# Patient Record
Sex: FEMALE | Race: WHITE | Employment: FULL TIME | URBAN - NONMETROPOLITAN AREA
[De-identification: names, ages, dates, MRNs, and addresses within clinical notes are randomized per-mention and may not be internally consistent; named-entity substitution may affect disease eponyms.]

---

## 2018-01-22 NOTE — PROGRESS NOTES
diagnosis & problem list.     Damien Gonzales is a 74 y.o. female seen in the consultation for evaluation regarding gallbladder. Onset of symptoms was over a year ago with gradually worsened since that time. The symptoms have included pain radiating to the back and epigastric pain. Owen Stephen describes the pain as aching, recurrent and moderate in severity. Her symptoms are worse with eating and improved with nothing. She denies any history of pancreatitis, jaundice, pancreatitis or ulcer disease. Symptoms have been unrelieved by antacids. This pain is different than her GERD. Stress test was negative. I have personally reviewed the following films:  CT scan performed on 11/13/17 which showed gallstones. Past Medical History  Past Medical History:   Diagnosis Date    COPD (chronic obstructive pulmonary disease) ([address]     Depression     Hypertension      Past Surgical History  Past Surgical History:   Procedure Laterality Date    APPENDECTOMY      COLONOSCOPY      CYST REMOVAL      multiple    HYSTERECTOMY      TUBAL LIGATION      UPPER GASTROINTESTINAL ENDOSCOPY       Medications  Current Outpatient Prescriptions   Medication Sig Dispense Refill    vitamin C (ASCORBIC ACID) 500 MG tablet Take 500 mg by mouth daily      Alendronate Sodium (FOSAMAX PO) Take by mouth once a week      hydrOXYzine (ATARAX) 25 MG tablet Take 25 mg by mouth every 6 hours as needed for Anxiety      albuterol-ipratropium (COMBIVENT)  MCG/ACT inhaler Inhale 2 puffs into the lungs every 6 hours as needed for Wheezing      albuterol sulfate  (90 Base) MCG/ACT inhaler Inhale 2 puffs into the lungs every 6 hours as needed for Wheezing      albuterol (PROVENTIL) (2.5 MG/3ML) 0.083% nebulizer solution Take 2.5 mg by nebulization every 6 hours as needed for Wheezing      HYDROCHLOROTHIAZIDE PO Take  by mouth daily.  Pyridoxine HCl (VITAMIN B-6) 100 MG tablet Take 100 mg by mouth daily.       aspirin 81 MG EC tablet Take 81 mg by mouth daily.  folic acid (FOLVITE) 1 MG tablet Take 1 mg by mouth daily.  omeprazole (PRILOSEC) 20 MG capsule Take 20 mg by mouth Daily       traZODone (DESYREL) 100 MG tablet Take 200 mg by mouth nightly. No current facility-administered medications for this visit. Allergies  has No Known Allergies. Family History  family history includes Cancer in an other family member; Diabetes in her maternal grandmother; Heart Disease in her father and mother; Memory Loss in her father and mother. Social History   reports that she quit smoking about 3 weeks ago. Her smoking use included Cigarettes. She has a 30.00 pack-year smoking history. She has never used smokeless tobacco. She reports that she does not drink alcohol or use drugs. Health Screening Exams  Health Maintenance   Topic Date Due    Potassium monitoring  1950    Creatinine monitoring  1950    Hepatitis C screen  1950    HIV screen  12/18/1967    DTaP/Tdap/Td vaccine (1 - Tdap) 12/18/1971    Cervical cancer screen  12/18/1973    Lipid screen  12/18/1992    Diabetes screen  12/18/1992    Breast cancer screen  12/18/2002    Colon cancer screen colonoscopy  12/18/2002    Smoker: low dose lung CT screening  12/18/2007    Zostavax vaccine  12/18/2012    Flu vaccine (1) 09/01/2017    Pneumococcal low/med risk (1 of 2 - PCV13) 12/18/2017    DEXA (modify frequency per FRAX score)  12/18/2017     Review of Systems  Constitutional: Positive for activity change, appetite change and fatigue. Negative for chills, diaphoresis, fever and unexpected weight change. HENT: Negative for congestion, dental problem, drooling, ear discharge, ear pain, facial swelling, hearing loss, mouth sores, nosebleeds, postnasal drip, rhinorrhea, sinus pain, sinus pressure, sneezing, sore throat, tinnitus, trouble swallowing and voice change.     Eyes: Negative for photophobia, pain, discharge, redness, itching and visual disturbance. Respiratory: Positive for cough and shortness of breath. Negative for apnea, choking, chest tightness, wheezing and stridor. Cardiovascular: Negative for chest pain, palpitations and leg swelling. Gastrointestinal: Positive for abdominal distention, abdominal pain and nausea. Negative for anal bleeding, blood in stool, constipation, diarrhea, rectal pain and vomiting. Genitourinary: Positive for difficulty urinating. Negative for decreased urine volume, dyspareunia, dysuria, enuresis, flank pain, frequency, genital sores, hematuria, menstrual problem, pelvic pain, urgency, vaginal bleeding, vaginal discharge and vaginal pain. Musculoskeletal: Positive for back pain and myalgias. Negative for arthralgias, gait problem, joint swelling, neck pain and neck stiffness. Skin: Negative for color change, pallor, rash and wound. Neurological: Negative for dizziness, tremors, seizures, syncope, facial asymmetry, speech difficulty, weakness, light-headedness, numbness and headaches. Hematological: Negative for adenopathy. Does not bruise/bleed easily. Psychiatric/Behavioral: Positive for sleep disturbance. Negative for agitation, behavioral problems, confusion, decreased concentration, dysphoric mood, hallucinations, self-injury and suicidal ideas. The patient is not nervous/anxious and is not hyperactive. OBJECTIVE      VITALS:  height is 5' 5\" (1.651 m) and weight is 165 lb (74.8 kg). Her tympanic temperature is 98.2 °F (36.8 °C). Her blood pressure is 126/70 and her pulse is 74. Her respiration is 18. Pain Score:   0 - No pain (abdominal \"pressure\")  CONSTITUTIONAL: Alert and oriented times 3, no acute distress and cooperative to examination with proper mood and affect. SKIN: Skin color, texture, turgor normal. No rashes or lesions. LYMPH: no cervical nodes, no inguinal nodes  HEENT: Head is normocephalic, atraumatic. EOMI, PERRLA.   NECK: Supple, symmetrical, trachea

## 2018-01-23 ENCOUNTER — OFFICE VISIT (OUTPATIENT)
Dept: SURGERY | Age: 68
End: 2018-01-23
Payer: MEDICARE

## 2018-01-23 VITALS
SYSTOLIC BLOOD PRESSURE: 126 MMHG | WEIGHT: 165 LBS | RESPIRATION RATE: 18 BRPM | HEIGHT: 65 IN | TEMPERATURE: 98.2 F | DIASTOLIC BLOOD PRESSURE: 70 MMHG | BODY MASS INDEX: 27.49 KG/M2 | HEART RATE: 74 BPM

## 2018-01-23 DIAGNOSIS — Z01.818 PRE-OP TESTING: ICD-10-CM

## 2018-01-23 DIAGNOSIS — K80.10 CHRONIC CHOLECYSTITIS WITH CALCULUS: Primary | ICD-10-CM

## 2018-01-23 DIAGNOSIS — I10 ESSENTIAL HYPERTENSION: ICD-10-CM

## 2018-01-23 PROCEDURE — 99204 OFFICE O/P NEW MOD 45 MIN: CPT | Performed by: SURGERY

## 2018-01-23 RX ORDER — HYDROXYZINE HYDROCHLORIDE 25 MG/1
25 TABLET, FILM COATED ORAL EVERY 6 HOURS PRN
COMMUNITY
End: 2018-03-19

## 2018-01-23 RX ORDER — ALBUTEROL SULFATE 2.5 MG/3ML
2.5 SOLUTION RESPIRATORY (INHALATION) EVERY 6 HOURS PRN
COMMUNITY
End: 2021-01-13 | Stop reason: SDUPTHER

## 2018-01-23 RX ORDER — ASCORBIC ACID 500 MG
500 TABLET ORAL DAILY
COMMUNITY

## 2018-01-23 RX ORDER — ALBUTEROL SULFATE 90 UG/1
2 AEROSOL, METERED RESPIRATORY (INHALATION) EVERY 6 HOURS PRN
COMMUNITY
End: 2019-04-16 | Stop reason: ALTCHOICE

## 2018-01-23 ASSESSMENT — ENCOUNTER SYMPTOMS
SORE THROAT: 0
VOICE CHANGE: 0
EYE REDNESS: 0
CHOKING: 0
BLOOD IN STOOL: 0
CONSTIPATION: 0
RHINORRHEA: 0
PHOTOPHOBIA: 0
CHEST TIGHTNESS: 0
STRIDOR: 0
ANAL BLEEDING: 0
EYE DISCHARGE: 0
APNEA: 0
EYE PAIN: 0
NAUSEA: 1
RECTAL PAIN: 0
DIARRHEA: 0
SINUS PRESSURE: 0
SINUS PAIN: 0
ABDOMINAL DISTENTION: 1
COLOR CHANGE: 0
VOMITING: 0
COUGH: 1
SHORTNESS OF BREATH: 1
FACIAL SWELLING: 0
BACK PAIN: 1
ABDOMINAL PAIN: 1
WHEEZING: 0
TROUBLE SWALLOWING: 0
EYE ITCHING: 0

## 2018-01-23 NOTE — PROGRESS NOTES
hallucinations, self-injury and suicidal ideas. The patient is not nervous/anxious and is not hyperactive.       /70 (Site: Right Arm, Position: Sitting, Cuff Size: Medium Adult)   Pulse 74   Temp 98.2 °F (36.8 °C) (Tympanic)   Resp 18   Ht 5' 5\" (1.651 m)   Wt 165 lb (74.8 kg)   BMI 27.46 kg/m²     Objective:   Physical Exam    Assessment:            Plan:

## 2018-01-29 ENCOUNTER — HOSPITAL ENCOUNTER (OUTPATIENT)
Age: 68
Setting detail: OUTPATIENT SURGERY
Discharge: HOME | End: 2018-01-29
Attending: SURGERY | Admitting: SURGERY
Payer: MEDICARE

## 2018-01-29 ENCOUNTER — ANESTHESIA EVENT (OUTPATIENT)
Dept: OPERATING ROOM | Age: 68
End: 2018-01-29
Payer: MEDICARE

## 2018-01-29 ENCOUNTER — ANESTHESIA (OUTPATIENT)
Dept: OPERATING ROOM | Age: 68
End: 2018-01-29
Payer: MEDICARE

## 2018-01-29 VITALS
HEART RATE: 70 BPM | SYSTOLIC BLOOD PRESSURE: 133 MMHG | DIASTOLIC BLOOD PRESSURE: 73 MMHG | OXYGEN SATURATION: 95 % | HEIGHT: 65 IN | WEIGHT: 165 LBS | RESPIRATION RATE: 16 BRPM | TEMPERATURE: 98 F | BODY MASS INDEX: 27.49 KG/M2

## 2018-01-29 VITALS
RESPIRATION RATE: 18 BRPM | SYSTOLIC BLOOD PRESSURE: 148 MMHG | OXYGEN SATURATION: 99 % | TEMPERATURE: 96.8 F | DIASTOLIC BLOOD PRESSURE: 73 MMHG

## 2018-01-29 DIAGNOSIS — G89.18 ACUTE POSTOPERATIVE PAIN: Primary | ICD-10-CM

## 2018-01-29 PROCEDURE — 7100000001 HC PACU RECOVERY - ADDTL 15 MIN: Performed by: SURGERY

## 2018-01-29 PROCEDURE — 6360000002 HC RX W HCPCS: Performed by: ANESTHESIOLOGY

## 2018-01-29 PROCEDURE — 7100000000 HC PACU RECOVERY - FIRST 15 MIN: Performed by: SURGERY

## 2018-01-29 PROCEDURE — 2780000010 HC IMPLANT OTHER: Performed by: SURGERY

## 2018-01-29 PROCEDURE — 3600000013 HC SURGERY LEVEL 3 ADDTL 15MIN: Performed by: SURGERY

## 2018-01-29 PROCEDURE — 47562 LAPAROSCOPIC CHOLECYSTECTOMY: CPT | Performed by: SURGERY

## 2018-01-29 PROCEDURE — 3600000003 HC SURGERY LEVEL 3 BASE: Performed by: SURGERY

## 2018-01-29 PROCEDURE — 2500000003 HC RX 250 WO HCPCS: Performed by: SURGERY

## 2018-01-29 PROCEDURE — 6360000002 HC RX W HCPCS: Performed by: NURSE ANESTHETIST, CERTIFIED REGISTERED

## 2018-01-29 PROCEDURE — 88304 TISSUE EXAM BY PATHOLOGIST: CPT

## 2018-01-29 PROCEDURE — 2580000003 HC RX 258: Performed by: SURGERY

## 2018-01-29 PROCEDURE — 3700000001 HC ADD 15 MINUTES (ANESTHESIA): Performed by: SURGERY

## 2018-01-29 PROCEDURE — 6360000002 HC RX W HCPCS: Performed by: SURGERY

## 2018-01-29 PROCEDURE — 3700000000 HC ANESTHESIA ATTENDED CARE: Performed by: SURGERY

## 2018-01-29 PROCEDURE — 7100000011 HC PHASE II RECOVERY - ADDTL 15 MIN: Performed by: SURGERY

## 2018-01-29 PROCEDURE — 2500000003 HC RX 250 WO HCPCS: Performed by: NURSE ANESTHETIST, CERTIFIED REGISTERED

## 2018-01-29 PROCEDURE — A4450 NON-WATERPROOF TAPE: HCPCS | Performed by: SURGERY

## 2018-01-29 PROCEDURE — 7100000010 HC PHASE II RECOVERY - FIRST 15 MIN: Performed by: SURGERY

## 2018-01-29 RX ORDER — FENTANYL CITRATE 50 UG/ML
50 INJECTION, SOLUTION INTRAMUSCULAR; INTRAVENOUS EVERY 5 MIN PRN
Status: DISCONTINUED | OUTPATIENT
Start: 2018-01-29 | End: 2018-01-29 | Stop reason: HOSPADM

## 2018-01-29 RX ORDER — ONDANSETRON 2 MG/ML
4 INJECTION INTRAMUSCULAR; INTRAVENOUS EVERY 6 HOURS PRN
Status: DISCONTINUED | OUTPATIENT
Start: 2018-01-29 | End: 2018-01-29 | Stop reason: HOSPADM

## 2018-01-29 RX ORDER — LIDOCAINE HYDROCHLORIDE 20 MG/ML
INJECTION, SOLUTION INFILTRATION; PERINEURAL PRN
Status: DISCONTINUED | OUTPATIENT
Start: 2018-01-29 | End: 2018-01-29 | Stop reason: SDUPTHER

## 2018-01-29 RX ORDER — HYDROCODONE BITARTRATE AND ACETAMINOPHEN 5; 325 MG/1; MG/1
1 TABLET ORAL EVERY 4 HOURS PRN
Qty: 40 TABLET | Refills: 0 | Status: SHIPPED | OUTPATIENT
Start: 2018-01-29 | End: 2018-02-05

## 2018-01-29 RX ORDER — GLYCOPYRROLATE 0.2 MG/ML
INJECTION INTRAMUSCULAR; INTRAVENOUS PRN
Status: DISCONTINUED | OUTPATIENT
Start: 2018-01-29 | End: 2018-01-29 | Stop reason: SDUPTHER

## 2018-01-29 RX ORDER — SUCCINYLCHOLINE CHLORIDE 20 MG/ML
INJECTION INTRAMUSCULAR; INTRAVENOUS PRN
Status: DISCONTINUED | OUTPATIENT
Start: 2018-01-29 | End: 2018-01-29 | Stop reason: SDUPTHER

## 2018-01-29 RX ORDER — ROCURONIUM BROMIDE 10 MG/ML
INJECTION, SOLUTION INTRAVENOUS PRN
Status: DISCONTINUED | OUTPATIENT
Start: 2018-01-29 | End: 2018-01-29 | Stop reason: SDUPTHER

## 2018-01-29 RX ORDER — LAMOTRIGINE 100 MG/1
100 TABLET ORAL DAILY
COMMUNITY

## 2018-01-29 RX ORDER — MORPHINE SULFATE 2 MG/ML
2 INJECTION, SOLUTION INTRAMUSCULAR; INTRAVENOUS EVERY 5 MIN PRN
Status: DISCONTINUED | OUTPATIENT
Start: 2018-01-29 | End: 2018-01-29 | Stop reason: HOSPADM

## 2018-01-29 RX ORDER — SODIUM CHLORIDE 9 MG/ML
INJECTION, SOLUTION INTRAVENOUS CONTINUOUS
Status: DISCONTINUED | OUTPATIENT
Start: 2018-01-29 | End: 2018-01-29 | Stop reason: HOSPADM

## 2018-01-29 RX ORDER — HYDROCODONE BITARTRATE AND ACETAMINOPHEN 5; 325 MG/1; MG/1
2 TABLET ORAL EVERY 4 HOURS PRN
Status: DISCONTINUED | OUTPATIENT
Start: 2018-01-29 | End: 2018-01-29 | Stop reason: HOSPADM

## 2018-01-29 RX ORDER — FENTANYL CITRATE 50 UG/ML
INJECTION, SOLUTION INTRAMUSCULAR; INTRAVENOUS PRN
Status: DISCONTINUED | OUTPATIENT
Start: 2018-01-29 | End: 2018-01-29 | Stop reason: SDUPTHER

## 2018-01-29 RX ORDER — PROPOFOL 10 MG/ML
INJECTION, EMULSION INTRAVENOUS PRN
Status: DISCONTINUED | OUTPATIENT
Start: 2018-01-29 | End: 2018-01-29 | Stop reason: SDUPTHER

## 2018-01-29 RX ORDER — MIDAZOLAM HYDROCHLORIDE 1 MG/ML
INJECTION INTRAMUSCULAR; INTRAVENOUS PRN
Status: DISCONTINUED | OUTPATIENT
Start: 2018-01-29 | End: 2018-01-29 | Stop reason: SDUPTHER

## 2018-01-29 RX ORDER — BUPIVACAINE HYDROCHLORIDE AND EPINEPHRINE 5; 5 MG/ML; UG/ML
INJECTION, SOLUTION EPIDURAL; INTRACAUDAL; PERINEURAL PRN
Status: DISCONTINUED | OUTPATIENT
Start: 2018-01-29 | End: 2018-01-29 | Stop reason: HOSPADM

## 2018-01-29 RX ORDER — SODIUM CHLORIDE 0.9 % (FLUSH) 0.9 %
10 SYRINGE (ML) INJECTION PRN
Status: DISCONTINUED | OUTPATIENT
Start: 2018-01-29 | End: 2018-01-29 | Stop reason: HOSPADM

## 2018-01-29 RX ORDER — LABETALOL HYDROCHLORIDE 5 MG/ML
10 INJECTION, SOLUTION INTRAVENOUS EVERY 10 MIN PRN
Status: DISCONTINUED | OUTPATIENT
Start: 2018-01-29 | End: 2018-01-29 | Stop reason: HOSPADM

## 2018-01-29 RX ORDER — HYDROCODONE BITARTRATE AND ACETAMINOPHEN 5; 325 MG/1; MG/1
1 TABLET ORAL EVERY 4 HOURS PRN
Status: DISCONTINUED | OUTPATIENT
Start: 2018-01-29 | End: 2018-01-29 | Stop reason: HOSPADM

## 2018-01-29 RX ORDER — ONDANSETRON 2 MG/ML
INJECTION INTRAMUSCULAR; INTRAVENOUS PRN
Status: DISCONTINUED | OUTPATIENT
Start: 2018-01-29 | End: 2018-01-29 | Stop reason: SDUPTHER

## 2018-01-29 RX ORDER — NEOSTIGMINE METHYLSULFATE 1 MG/ML
INJECTION, SOLUTION INTRAVENOUS PRN
Status: DISCONTINUED | OUTPATIENT
Start: 2018-01-29 | End: 2018-01-29 | Stop reason: SDUPTHER

## 2018-01-29 RX ORDER — SODIUM CHLORIDE 0.9 % (FLUSH) 0.9 %
10 SYRINGE (ML) INJECTION EVERY 12 HOURS SCHEDULED
Status: DISCONTINUED | OUTPATIENT
Start: 2018-01-29 | End: 2018-01-29 | Stop reason: HOSPADM

## 2018-01-29 RX ORDER — PROMETHAZINE HYDROCHLORIDE 12.5 MG/1
12.5 TABLET ORAL EVERY 6 HOURS PRN
Qty: 20 TABLET | Refills: 0 | Status: SHIPPED | OUTPATIENT
Start: 2018-01-29 | End: 2018-02-05

## 2018-01-29 RX ORDER — DEXAMETHASONE SODIUM PHOSPHATE 4 MG/ML
INJECTION, SOLUTION INTRA-ARTICULAR; INTRALESIONAL; INTRAMUSCULAR; INTRAVENOUS; SOFT TISSUE PRN
Status: DISCONTINUED | OUTPATIENT
Start: 2018-01-29 | End: 2018-01-29 | Stop reason: SDUPTHER

## 2018-01-29 RX ADMIN — ONDANSETRON 4 MG: 2 INJECTION INTRAMUSCULAR; INTRAVENOUS at 07:39

## 2018-01-29 RX ADMIN — SUCCINYLCHOLINE CHLORIDE 120 MG: 20 INJECTION, SOLUTION INTRAMUSCULAR; INTRAVENOUS at 07:34

## 2018-01-29 RX ADMIN — NEOSTIGMINE METHYLSULFATE 3 MG: 1 INJECTION, SOLUTION INTRAVENOUS at 08:05

## 2018-01-29 RX ADMIN — FENTANYL CITRATE 50 MCG: 50 INJECTION INTRAMUSCULAR; INTRAVENOUS at 08:35

## 2018-01-29 RX ADMIN — LIDOCAINE HYDROCHLORIDE 100 MG: 20 INJECTION, SOLUTION INFILTRATION; PERINEURAL at 07:30

## 2018-01-29 RX ADMIN — SODIUM CHLORIDE: 9 INJECTION, SOLUTION INTRAVENOUS at 06:33

## 2018-01-29 RX ADMIN — WATER 2 G: 1 INJECTION INTRAMUSCULAR; INTRAVENOUS; SUBCUTANEOUS at 07:39

## 2018-01-29 RX ADMIN — PROPOFOL 150 MG: 10 INJECTION, EMULSION INTRAVENOUS at 07:34

## 2018-01-29 RX ADMIN — Medication 20 MG: at 07:44

## 2018-01-29 RX ADMIN — MIDAZOLAM HYDROCHLORIDE 2 MG: 1 INJECTION, SOLUTION INTRAMUSCULAR; INTRAVENOUS at 07:26

## 2018-01-29 RX ADMIN — FENTANYL CITRATE 50 MCG: 50 INJECTION INTRAMUSCULAR; INTRAVENOUS at 08:30

## 2018-01-29 RX ADMIN — FENTANYL CITRATE 100 MCG: 50 INJECTION INTRAMUSCULAR; INTRAVENOUS at 07:30

## 2018-01-29 RX ADMIN — GLYCOPYRROLATE 0.6 MG: 0.2 INJECTION, SOLUTION INTRAMUSCULAR; INTRAVENOUS at 08:05

## 2018-01-29 RX ADMIN — DEXAMETHASONE SODIUM PHOSPHATE 10 MG: 4 INJECTION, SOLUTION INTRAMUSCULAR; INTRAVENOUS at 07:39

## 2018-01-29 ASSESSMENT — PULMONARY FUNCTION TESTS
PIF_VALUE: 2
PIF_VALUE: 15
PIF_VALUE: 23
PIF_VALUE: 4
PIF_VALUE: 28
PIF_VALUE: 24
PIF_VALUE: 20
PIF_VALUE: 4
PIF_VALUE: 10
PIF_VALUE: 15
PIF_VALUE: 2
PIF_VALUE: 4
PIF_VALUE: 25
PIF_VALUE: 0
PIF_VALUE: 22
PIF_VALUE: 18
PIF_VALUE: 13
PIF_VALUE: 18
PIF_VALUE: 24
PIF_VALUE: 25
PIF_VALUE: 15
PIF_VALUE: 9
PIF_VALUE: 22
PIF_VALUE: 0
PIF_VALUE: 2
PIF_VALUE: 22
PIF_VALUE: 23
PIF_VALUE: 2
PIF_VALUE: 25
PIF_VALUE: 15
PIF_VALUE: 23
PIF_VALUE: 24
PIF_VALUE: 20
PIF_VALUE: 24
PIF_VALUE: 21
PIF_VALUE: 4
PIF_VALUE: 19
PIF_VALUE: 25
PIF_VALUE: 16
PIF_VALUE: 17
PIF_VALUE: 23
PIF_VALUE: 16
PIF_VALUE: 16
PIF_VALUE: 0
PIF_VALUE: 15
PIF_VALUE: 6
PIF_VALUE: 2
PIF_VALUE: 2
PIF_VALUE: 15
PIF_VALUE: 16

## 2018-01-29 ASSESSMENT — PAIN SCALES - GENERAL
PAINLEVEL_OUTOF10: 0
PAINLEVEL_OUTOF10: 7
PAINLEVEL_OUTOF10: 4
PAINLEVEL_OUTOF10: 4
PAINLEVEL_OUTOF10: 7
PAINLEVEL_OUTOF10: 4

## 2018-01-29 NOTE — ANESTHESIA PRE PROCEDURE
given: Not Answered      Vital Signs (Current):   Vitals:    01/24/18 0841 01/29/18 0619   BP:  112/65   Pulse:  75   Resp:  16   Temp:  36.7 °C (98.1 °F)   SpO2:  96%   Weight: 165 lb (74.8 kg) 165 lb (74.8 kg)   Height: 5' 5\" (1.651 m)                                               BP Readings from Last 3 Encounters:   01/29/18 112/65   01/23/18 126/70   08/19/13 116/76       NPO Status: Time of last liquid consumption: 2100                        Time of last solid consumption: 2330                        Date of last liquid consumption: 01/28/18                        Date of last solid food consumption: 01/28/18    BMI:   Wt Readings from Last 3 Encounters:   01/29/18 165 lb (74.8 kg)   01/23/18 165 lb (74.8 kg)   08/19/13 170 lb (77.1 kg)     Body mass index is 27.46 kg/m². CBC: No results found for: WBC, RBC, HGB, HCT, MCV, RDW, PLT    CMP: No results found for: NA, K, CL, CO2, BUN, CREATININE, GFRAA, AGRATIO, LABGLOM, GLUCOSE, PROT, CALCIUM, BILITOT, ALKPHOS, AST, ALT    POC Tests: No results for input(s): POCGLU, POCNA, POCK, POCCL, POCBUN, POCHEMO, POCHCT in the last 72 hours.     Coags: No results found for: PROTIME, INR, APTT    HCG (If Applicable): No results found for: PREGTESTUR, PREGSERUM, HCG, HCGQUANT     ABGs: No results found for: PHART, PO2ART, [id number], TIX4HDQ, BEART, F1GNQCXQ     Type & Screen (If Applicable):  No results found for: LABABO, [address]    Anesthesia Evaluation  Patient summary reviewed and Nursing notes reviewed  Airway: Mallampati: II  TM distance: <3 FB   Neck ROM: full  Mouth opening: > = 3 FB Dental:      Comment: Denies any loose/removable    Pulmonary: breath sounds clear to auscultation  (+) COPD:  decreased breath sounds,                             Cardiovascular:Negative CV ROS  Exercise tolerance: good (>4 METS),           Rhythm: regular  Rate: normal           Beta Blocker:  Not on Beta Blocker         Neuro/Psych:   Negative Neuro/Psych ROS GI/Hepatic/Renal:   (+) GERD:,           Endo/Other: Negative Endo/Other ROS                    Abdominal:           Vascular: negative vascular ROS. Anesthesia Plan      general     ASA 2       Induction: intravenous. MIPS: Prophylactic antiemetics administered. Anesthetic plan and risks discussed with patient. Plan discussed with attending.                   Tsiha Chaves CRNA   1/29/2018

## 2018-01-29 NOTE — OP NOTE
[facility]  Operative Report  PATIENT NAME: Rosa Mesa [facility] RECORD NO. [id number]  SURGEON: Ayden Keenan  Primary Care Physician: Vel Conrad MD     PROCEDURE PERFORMED:  1/29/2018  PREOPERATIVE DIAGNOSIS: CHRONIC CHOLECYSTITIS WITH CALCULUS  POSTOPERATIVE DIAGNOSIS: Same, path pending  PROCEDURE PERFORMED:  Laparoscopic cholecystectomy. SURGEON:  Dr. Amy Martin. ANESTHESIA:  General with local.  ESTIMATED BLOOD LOSS:  10 ml  SPECIMEN:  Gallbladder sent to pathology for analysis. COMPLICATIONS:  None immediately appreciated. DISCUSSION: Darrold Mortimer is a 74y.o. year old female who was seen in evaluation at request of Vel Conrad MD regarding signs and symptoms, gallbladder disease, radiographic findings of gallstone. After history and physical examination was performed potential diagnostic and therapeutic modalities discussed with the patient. Operative and non operative management was discussed. Laparoscopic and open approaches reviewed. She was given opportunity to ask questions. Once answered informed consent was obtained. She was brought to operating room on 1/29/2018 for procedure. OPERATIVE FINDINGS:  At time of laparoscopy liver was uniformly enlarged. The gallbladder was somewhat intrahepatic. Common bile duct was well visualized. Ductal structures well visualized. The remainder of abdominal viscera was unremarkable. Gallbladder removed as described below. PROCEDURE:  The patient was brought to the operating room and placed in supine position. Placed under continuous cardiac telemetry, blood pressure, pulse oximetry monitoring and placed under general anesthesia by anesthesia department. The anterior abdominal wall was prepped and draped in sterile fashion. 1 cm periumbilical incision made with #11 scalpel blade. 10 mm Optiview port inserted into the abdomen under direct visualization.   Pneumoperitoneum was created to total of discharge instructions, prescriptions for analgesics and antiemetics. She will follow up in my office in a 2-week period of time for reevaluation.       Electronically signed by Dea Whitaker DO on 1/29/2018 at 8:07 AM

## 2018-01-30 ENCOUNTER — TELEPHONE (OUTPATIENT)
Dept: SURGERY | Age: 68
End: 2018-01-30

## 2018-02-12 NOTE — PROGRESS NOTES
Take by mouth daily      vitamin C (ASCORBIC ACID) 500 MG tablet Take 500 mg by mouth daily      Alendronate Sodium (FOSAMAX PO) Take by mouth once a week      hydrOXYzine (ATARAX) 25 MG tablet Take 25 mg by mouth every 6 hours as needed for Anxiety      albuterol-ipratropium (COMBIVENT)  MCG/ACT inhaler Inhale 2 puffs into the lungs every 6 hours as needed for Wheezing      albuterol sulfate  (90 Base) MCG/ACT inhaler Inhale 2 puffs into the lungs every 6 hours as needed for Wheezing      albuterol (PROVENTIL) (2.5 MG/3ML) 0.083% nebulizer solution Take 2.5 mg by nebulization every 6 hours as needed for Wheezing      HYDROCHLOROTHIAZIDE PO Take  by mouth daily.  Pyridoxine HCl (VITAMIN B-6) 100 MG tablet Take 100 mg by mouth daily.  aspirin 81 MG EC tablet Take 81 mg by mouth daily.  folic acid (FOLVITE) 1 MG tablet Take 1 mg by mouth daily.  omeprazole (PRILOSEC) 20 MG capsule Take 40 mg by mouth Daily       traZODone (DESYREL) 100 MG tablet Take 100 mg by mouth nightly        No current facility-administered medications for this visit. Allergies  is allergic to adhesive tape. Social History   reports that she quit smoking about 6 weeks ago. Her smoking use included Cigarettes. She has a 30.00 pack-year smoking history. She has never used smokeless tobacco. She reports that she does not drink alcohol or use drugs.   Health Screening Exams  Health Maintenance   Topic Date Due    Potassium monitoring  1950    Creatinine monitoring  1950    Hepatitis C screen  1950    HIV screen  12/18/1967    DTaP/Tdap/Td vaccine (1 - Tdap) 12/18/1971    Cervical cancer screen  12/18/1973    Lipid screen  12/18/1992    Diabetes screen  12/18/1992    Breast cancer screen  12/18/2002    Colon cancer screen colonoscopy  12/18/2002    Smoker: low dose lung CT screening  12/18/2007    Zostavax vaccine  12/18/2012    Flu vaccine (1) 09/01/2017   

## 2018-02-13 ENCOUNTER — OFFICE VISIT (OUTPATIENT)
Dept: SURGERY | Age: 68
End: 2018-02-13

## 2018-02-13 VITALS
OXYGEN SATURATION: 94 % | SYSTOLIC BLOOD PRESSURE: 118 MMHG | TEMPERATURE: 97.2 F | DIASTOLIC BLOOD PRESSURE: 60 MMHG | RESPIRATION RATE: 18 BRPM | WEIGHT: 166 LBS | HEIGHT: 65 IN | HEART RATE: 96 BPM | BODY MASS INDEX: 27.66 KG/M2

## 2018-02-13 DIAGNOSIS — Z90.49 S/P LAPAROSCOPIC CHOLECYSTECTOMY: Primary | ICD-10-CM

## 2018-02-13 PROCEDURE — 99024 POSTOP FOLLOW-UP VISIT: CPT | Performed by: SURGERY

## 2018-02-14 ENCOUNTER — INITIAL CONSULT (OUTPATIENT)
Dept: PULMONOLOGY | Age: 68
End: 2018-02-14
Payer: MEDICARE

## 2018-02-14 VITALS
HEART RATE: 88 BPM | HEIGHT: 65 IN | OXYGEN SATURATION: 98 % | WEIGHT: 164.6 LBS | SYSTOLIC BLOOD PRESSURE: 102 MMHG | BODY MASS INDEX: 27.42 KG/M2 | DIASTOLIC BLOOD PRESSURE: 60 MMHG

## 2018-02-14 DIAGNOSIS — G47.09 OTHER INSOMNIA: ICD-10-CM

## 2018-02-14 DIAGNOSIS — F32.A DEPRESSION, UNSPECIFIED DEPRESSION TYPE: ICD-10-CM

## 2018-02-14 DIAGNOSIS — J44.9 CHRONIC OBSTRUCTIVE PULMONARY DISEASE, UNSPECIFIED COPD TYPE (HCC): ICD-10-CM

## 2018-02-14 DIAGNOSIS — G47.30 SLEEP APNEA, UNSPECIFIED TYPE: Primary | ICD-10-CM

## 2018-02-14 PROCEDURE — 99203 OFFICE O/P NEW LOW 30 MIN: CPT | Performed by: INTERNAL MEDICINE

## 2018-02-14 RX ORDER — ZOLPIDEM TARTRATE 5 MG/1
5 TABLET ORAL NIGHTLY PRN
Qty: 1 TABLET | Refills: 0 | Status: SHIPPED | OUTPATIENT
Start: 2018-02-14 | End: 2018-02-15

## 2018-02-28 ENCOUNTER — HOSPITAL ENCOUNTER (OUTPATIENT)
Dept: SLEEP CENTER | Age: 68
Discharge: HOME | End: 2018-03-02
Payer: MEDICARE

## 2018-02-28 DIAGNOSIS — G47.30 SLEEP APNEA, UNSPECIFIED TYPE: ICD-10-CM

## 2018-02-28 DIAGNOSIS — J44.9 CHRONIC OBSTRUCTIVE PULMONARY DISEASE, UNSPECIFIED COPD TYPE (HCC): ICD-10-CM

## 2018-02-28 DIAGNOSIS — G47.09 OTHER INSOMNIA: ICD-10-CM

## 2018-02-28 DIAGNOSIS — F32.A DEPRESSION, UNSPECIFIED DEPRESSION TYPE: ICD-10-CM

## 2018-02-28 PROCEDURE — 95810 POLYSOM 6/> YRS 4/> PARAM: CPT

## 2018-03-01 LAB — STATUS: NORMAL

## 2018-03-19 ENCOUNTER — OFFICE VISIT (OUTPATIENT)
Dept: PULMONOLOGY | Age: 68
End: 2018-03-19
Payer: MEDICARE

## 2018-03-19 VITALS
DIASTOLIC BLOOD PRESSURE: 60 MMHG | OXYGEN SATURATION: 95 % | BODY MASS INDEX: 27.86 KG/M2 | HEIGHT: 65 IN | WEIGHT: 167.2 LBS | SYSTOLIC BLOOD PRESSURE: 118 MMHG | HEART RATE: 95 BPM

## 2018-03-19 DIAGNOSIS — F51.04 PSYCHOPHYSIOLOGICAL INSOMNIA: Primary | ICD-10-CM

## 2018-03-19 PROCEDURE — 99213 OFFICE O/P EST LOW 20 MIN: CPT | Performed by: PHYSICIAN ASSISTANT

## 2018-03-19 RX ORDER — ZOLPIDEM TARTRATE 5 MG/1
5 TABLET ORAL NIGHTLY PRN
Qty: 30 TABLET | Refills: 1 | Status: SHIPPED | OUTPATIENT
Start: 2018-03-19 | End: 2018-04-18

## 2018-03-19 RX ORDER — CLONAZEPAM 0.25 MG/1
0.25 TABLET, ORALLY DISINTEGRATING ORAL 2 TIMES DAILY PRN
COMMUNITY

## 2018-03-19 NOTE — PROGRESS NOTES
Take 100 mg by mouth daily      Cholecalciferol (VITAMIN D PO) Take by mouth daily      vitamin C (ASCORBIC ACID) 500 MG tablet Take 500 mg by mouth daily      Alendronate Sodium (FOSAMAX PO) Take by mouth once a week      albuterol-ipratropium (COMBIVENT)  MCG/ACT inhaler Inhale 2 puffs into the lungs every 6 hours as needed for Wheezing      albuterol sulfate  (90 Base) MCG/ACT inhaler Inhale 2 puffs into the lungs every 6 hours as needed for Wheezing      albuterol (PROVENTIL) (2.5 MG/3ML) 0.083% nebulizer solution Take 2.5 mg by nebulization every 6 hours as needed for Wheezing      HYDROCHLOROTHIAZIDE PO Take  by mouth daily.  Pyridoxine HCl (VITAMIN B-6) 100 MG tablet Take 100 mg by mouth daily.  aspirin 81 MG EC tablet Take 81 mg by mouth daily.  folic acid (FOLVITE) 1 MG tablet Take 1 mg by mouth daily.  omeprazole (PRILOSEC) 20 MG capsule Take 40 mg by mouth Daily        No current facility-administered medications for this visit. Exam  Vitals -  /60   Pulse 95   Ht 5' 5\" (1.651 m)   Wt 167 lb 3.2 oz (75.8 kg)   SpO2 95% Comment: room air at rest  BMI 27.82 kg/m²    Body mass index is 27.82 kg/m². Oxygen level -90.9 Room Air  Physical Exam   Constitutional: She is oriented to person, place, and time. She appears well-developed and well-nourished. HENT:   Head: Normocephalic and atraumatic. Mouth/Throat: Oropharynx is clear and moist. No oropharyngeal exudate. Eyes: Conjunctivae and EOM are normal. Pupils are equal, round, and reactive to light. Neck: Normal range of motion. Neck supple. No tracheal deviation present. No thyromegaly present. Cardiovascular: Normal rate, regular rhythm and normal heart sounds. No murmur heard. Pulmonary/Chest: Effort normal and breath sounds normal. No respiratory distress. She has no wheezes. She has no rales. She exhibits no tenderness. Abdominal: Soft.  Bowel sounds are normal. She exhibits no

## 2018-06-19 ENCOUNTER — OFFICE VISIT (OUTPATIENT)
Dept: PULMONOLOGY | Age: 68
End: 2018-06-19
Payer: MEDICARE

## 2018-06-19 VITALS
HEIGHT: 65 IN | SYSTOLIC BLOOD PRESSURE: 124 MMHG | BODY MASS INDEX: 28.31 KG/M2 | HEART RATE: 85 BPM | OXYGEN SATURATION: 95 % | DIASTOLIC BLOOD PRESSURE: 66 MMHG | WEIGHT: 169.9 LBS

## 2018-06-19 DIAGNOSIS — G47.00 INSOMNIA, UNSPECIFIED TYPE: ICD-10-CM

## 2018-06-19 DIAGNOSIS — F51.04 PSYCHOPHYSIOLOGICAL INSOMNIA: Primary | ICD-10-CM

## 2018-06-19 PROCEDURE — 99213 OFFICE O/P EST LOW 20 MIN: CPT | Performed by: PHYSICIAN ASSISTANT

## 2018-07-16 DIAGNOSIS — G47.00 INSOMNIA, UNSPECIFIED TYPE: ICD-10-CM

## 2018-07-16 RX ORDER — ZOLPIDEM TARTRATE 5 MG/1
TABLET ORAL
Qty: 30 TABLET | Refills: 2 | Status: SHIPPED | OUTPATIENT
Start: 2018-07-16 | End: 2018-08-15

## 2018-07-16 NOTE — TELEPHONE ENCOUNTER
Jes Green called requesting a refill on the following medications:  Requested Prescriptions     Pending Prescriptions Disp Refills    zolpidem (AMBIEN) 5 MG tablet 30 tablet 0     Pharmacy verified:  [facility]       Date of last visit: 06-19-18   Date of next visit (if applicable): 67-43-12

## 2018-09-08 NOTE — PROGRESS NOTES
recreational or IV drug use in the past:No     History of exposure to coal mines/coal dust: No  History of exposure to foundry dust/welding: No. She gives a hx of exposure to [employer] and fiberglass at her work place in the past.  History of exposure to quarry/silica/sandblasting: No  History of exposure to asbestos/working with breaks/ships: No  History of exposure to farm dust: Yes  History of recent travel to long distances: No  History of exposure to to birds, pigeons, or chickens in the past:No  Pet animals at home:Yes  Dogs: 2   Cats: 0    History of pulmonary embolism in the past: No            History of DVT in the past:No                     Review of Systems:   General/Constitutional: She gained ~15lbs weight in the last 8months with normal appetite. No fever or chills. HENT: Negative. Eyes: Negative. Upper respiratory tract: Occasional nasal stuffiness with no post nasal drip. Lower respiratory tract/ lungs: Occasional cough with white to clear sputum production. No hemoptysis. Cardiovascular: No palpitations or chest pain. Gastrointestinal: No nausea or vomiting. Neurological: No focal neurologiacal weakness. Extremities: No edema. Musculoskeletal: No complaints. Genitourinary: No complaints. Hematological: Negative. Psychiatric/Behavioral: Negative. Skin: No itching.       Current Medications:        Past Medical History:   Diagnosis Date    Acid reflux disease     COPD (chronic obstructive pulmonary disease) ([address]     Depression        Past Surgical History:   Procedure Laterality Date    APPENDECTOMY      COLONOSCOPY      CYST REMOVAL      multiple    HYSTERECTOMY      VT LAP,CHOLECYSTECTOMY N/A 1/29/2018    CHOLECYSTECTOMY LAPAROSCOPIC performed by Roma Ramos DO at [address] ENDOSCOPY         Allergies   Allergen Reactions    Hydrochlorothiazide Itching    Adhesive Tape Rash and Other (See Comments)     Pulls skin off Current Outpatient Prescriptions   Medication Sig Dispense Refill    zolpidem (AMBIEN) 5 MG tablet Take 5 mg by mouth nightly as needed for Sleep. Salma Marte clonazePAM (KLONOPIN) 0.25 MG disintegrating tablet Take 0.25 mg by mouth 2 times daily as needed.  lamoTRIgine (LAMICTAL) 100 MG tablet Take 100 mg by mouth daily      Cholecalciferol (VITAMIN D PO) Take by mouth daily      vitamin C (ASCORBIC ACID) 500 MG tablet Take 500 mg by mouth daily      Alendronate Sodium (FOSAMAX PO) Take by mouth once a week      albuterol-ipratropium (COMBIVENT)  MCG/ACT inhaler Inhale 2 puffs into the lungs every 6 hours as needed for Wheezing      albuterol sulfate  (90 Base) MCG/ACT inhaler Inhale 2 puffs into the lungs every 6 hours as needed for Wheezing      albuterol (PROVENTIL) (2.5 MG/3ML) 0.083% nebulizer solution Take 2.5 mg by nebulization every 6 hours as needed for Wheezing      Pyridoxine HCl (VITAMIN B-6) 100 MG tablet Take 100 mg by mouth daily.  aspirin 81 MG EC tablet Take 81 mg by mouth daily.  folic acid (FOLVITE) 1 MG tablet Take 1 mg by mouth daily.  omeprazole (PRILOSEC) 20 MG capsule Take 40 mg by mouth Daily       HYDROCHLOROTHIAZIDE PO Take  by mouth daily. No current facility-administered medications for this visit. Family History   Problem Relation Age of Onset    Heart Disease Mother     Memory Loss Mother     COPD Mother     Heart Disease Father     Memory Loss Father     COPD Father     Diabetes Maternal Grandmother     Cancer Other            Physical Exam:    VITALS:  /64   Pulse 77   Temp 97 °F (36.1 °C) (Oral)   Ht 5' 5\" (1.651 m)   Wt 170 lb (77.1 kg)   SpO2 98% Comment: Room Air  BMI 28.29 kg/m²   Nursing note and vitals reviewed. Constitutional: Patient appears moderately built and moderately nourished. No distress. Patient is oriented to person, place, and time. HENT:   Head: Normocephalic and atraumatic. Q6Hprn. - Continue Albuterol HFA 90mcg/Spray MDI, 2puffs  Q6Hprn. - Albuterol 2.5mg nebs Q6h prn (the nebulizer). -She instructed to use Albuterol HFA  inhaler 2 puffs Q6h prn or Albuterol 2.5mg nebs Q6h prn (the nebulizer) at one time as rescue medication not both at the same time. She verbalizes understanding. \  - Will send serum for [id number] level today and to be followed at next clinic visit. - She was educated and demonstrated in my office how to use inhalers. Aneudy Jung advised to continue prescribed inhalers and keep good compliance. - Patient educated to update her pneumococcal vaccine with family physician and take influenza vaccine in coming season with out fail.   - Schedule patient for Pulmonary rehab consult as soon as possible for pulmonary rehab therapy for her COPD.  - Aneudy Jung educated about my impression and plan. She verbalizes understanding.   -Schedule patient for low dose CT scan of chest with out IV contrast as recommended by the  U.S. Preventive Services Task Force and the NCCN for lung Cancer Screening as soon as possible. -Aneudy Jung was advised and instructed to come for follow up with my clinic in 1 to 2 weeks after having his low dose CT chest to go over the above test results and management. Low Dose CT (LDCT) Lung Screening criteria met   Age 50-69   Pack year smoking >30   Still smoking or less than 15 year since quit   No sign or symptoms of lung cancer   > 11 months since last LDCT     Risks and benefits of lung cancer screening with LDCT scans discussed:    Significance of positive screen - False-positive LDCT results often occur. 95% of all positive results do not lead to a diagnosis of cancer. Usually further imaging can resolve most false-positive results; however, some patients may require invasive procedures.     Over diagnosis risk - 10% to 12% of screen-detected lung cancer cases are over diagnosed--that is, the cancer

## 2018-09-10 ENCOUNTER — OFFICE VISIT (OUTPATIENT)
Dept: PULMONOLOGY | Age: 68
End: 2018-09-10
Payer: MEDICARE

## 2018-09-10 VITALS
SYSTOLIC BLOOD PRESSURE: 120 MMHG | WEIGHT: 170 LBS | HEART RATE: 77 BPM | DIASTOLIC BLOOD PRESSURE: 64 MMHG | HEIGHT: 65 IN | OXYGEN SATURATION: 98 % | TEMPERATURE: 97 F | BODY MASS INDEX: 28.32 KG/M2

## 2018-09-10 DIAGNOSIS — Z87.891 PERSONAL HISTORY OF TOBACCO USE: Primary | ICD-10-CM

## 2018-09-10 DIAGNOSIS — J44.9 STAGE 3 SEVERE COPD BY GOLD CLASSIFICATION (HCC): ICD-10-CM

## 2018-09-10 PROCEDURE — 99214 OFFICE O/P EST MOD 30 MIN: CPT | Performed by: INTERNAL MEDICINE

## 2018-09-10 PROCEDURE — G0296 VISIT TO DETERM LDCT ELIG: HCPCS | Performed by: INTERNAL MEDICINE

## 2018-09-10 RX ORDER — ZOLPIDEM TARTRATE 5 MG/1
5 TABLET ORAL NIGHTLY PRN
COMMUNITY
End: 2019-04-16

## 2018-09-17 ENCOUNTER — HOSPITAL ENCOUNTER (OUTPATIENT)
Dept: CT IMAGING | Age: 68
Discharge: HOME | End: 2018-09-17
Payer: MEDICARE

## 2018-09-17 DIAGNOSIS — J44.9 STAGE 3 SEVERE COPD BY GOLD CLASSIFICATION (HCC): ICD-10-CM

## 2018-09-17 DIAGNOSIS — Z87.891 PERSONAL HISTORY OF TOBACCO USE: ICD-10-CM

## 2018-09-17 PROCEDURE — G0297 LDCT FOR LUNG CA SCREEN: HCPCS

## 2018-10-02 ENCOUNTER — OFFICE VISIT (OUTPATIENT)
Dept: PSYCHOLOGY | Age: 68
End: 2018-10-02
Payer: MEDICARE

## 2018-10-02 DIAGNOSIS — M79.7 FIBROMYALGIA: ICD-10-CM

## 2018-10-02 DIAGNOSIS — F51.04 PSYCHOPHYSIOLOGICAL INSOMNIA: Primary | ICD-10-CM

## 2018-10-02 DIAGNOSIS — F33.41 DEPRESSION, MAJOR, RECURRENT, IN PARTIAL REMISSION (HCC): Chronic | ICD-10-CM

## 2018-10-02 DIAGNOSIS — J44.9 CHRONIC OBSTRUCTIVE PULMONARY DISEASE, UNSPECIFIED COPD TYPE (HCC): Chronic | ICD-10-CM

## 2018-10-02 PROBLEM — M81.0 OSTEOPOROSIS: Chronic | Status: ACTIVE | Noted: 2018-10-02

## 2018-10-02 PROCEDURE — 90791 PSYCH DIAGNOSTIC EVALUATION: CPT | Performed by: PSYCHOLOGIST

## 2018-10-12 ENCOUNTER — OFFICE VISIT (OUTPATIENT)
Dept: PULMONOLOGY | Age: 68
End: 2018-10-12
Payer: MEDICARE

## 2018-10-12 VITALS
BODY MASS INDEX: 27.79 KG/M2 | HEART RATE: 73 BPM | WEIGHT: 166.8 LBS | TEMPERATURE: 97.9 F | RESPIRATION RATE: 16 BRPM | SYSTOLIC BLOOD PRESSURE: 112 MMHG | HEIGHT: 65 IN | OXYGEN SATURATION: 98 % | DIASTOLIC BLOOD PRESSURE: 78 MMHG

## 2018-10-12 DIAGNOSIS — Z87.891 PERSONAL HISTORY OF TOBACCO USE: ICD-10-CM

## 2018-10-12 DIAGNOSIS — J44.9 STAGE 3 SEVERE COPD BY GOLD CLASSIFICATION (HCC): Primary | ICD-10-CM

## 2018-10-12 DIAGNOSIS — K21.9 GASTROESOPHAGEAL REFLUX DISEASE WITHOUT ESOPHAGITIS: ICD-10-CM

## 2018-10-12 PROCEDURE — 99214 OFFICE O/P EST MOD 30 MIN: CPT | Performed by: NURSE PRACTITIONER

## 2018-10-12 NOTE — PROGRESS NOTES
Maternal Grandmother     Cancer Other      CURRENT MEDICATIONS:  Current Outpatient Prescriptions   Medication Sig Dispense Refill    Tiotropium Bromide-Olodaterol 2.5-2.5 MCG/ACT AERS Inhale 2 puffs into the lungs daily 3 Inhaler 3    clonazePAM (KLONOPIN) 0.25 MG disintegrating tablet Take 0.25 mg by mouth 2 times daily as needed.  lamoTRIgine (LAMICTAL) 100 MG tablet Take 100 mg by mouth daily      Cholecalciferol (VITAMIN D PO) Take by mouth daily      vitamin C (ASCORBIC ACID) 500 MG tablet Take 500 mg by mouth daily      Alendronate Sodium (FOSAMAX PO) Take by mouth once a week      albuterol sulfate  (90 Base) MCG/ACT inhaler Inhale 2 puffs into the lungs every 6 hours as needed for Wheezing      HYDROCHLOROTHIAZIDE PO Take  by mouth daily.  Pyridoxine HCl (VITAMIN B-6) 100 MG tablet Take 100 mg by mouth daily.  aspirin 81 MG EC tablet Take 81 mg by mouth daily.  folic acid (FOLVITE) 1 MG tablet Take 1 mg by mouth daily.  omeprazole (PRILOSEC) 20 MG capsule Take 40 mg by mouth Daily       zolpidem (AMBIEN) 5 MG tablet Take 5 mg by mouth nightly as needed for Sleep. .      albuterol (PROVENTIL) (2.5 MG/3ML) 0.083% nebulizer solution Take 2.5 mg by nebulization every 6 hours as needed for Wheezing       No current facility-administered medications for this visit. Lian SAM   Review of Systems   Constitutional: Negative for chills and fever. HENT: Negative. Eyes: Negative. Respiratory: Positive for shortness of breath. Negative for cough and wheezing. Cardiovascular: Negative for chest pain, palpitations and leg swelling. Gastrointestinal: Negative for abdominal pain, diarrhea, nausea and vomiting. GERD- well controlled    Genitourinary: Negative. Musculoskeletal: Negative. Skin: Negative. Neurological: Negative. Hematological: Does not bruise/bleed easily. Psychiatric/Behavioral: Negative for suicidal ideas.         Physical exam /78 (Site: Left Upper Arm, Position: Sitting, Cuff Size: Medium Adult)   Pulse 73   Temp 97.9 °F (36.6 °C) Comment: Tympanic  Resp 16   Ht 5' 5\" (1.651 m)   Wt 166 lb 12.8 oz (75.7 kg)   SpO2 98% Comment: on RA  BMI 27.76 kg/m²        Physical Exam   Constitutional: She is oriented to person, place, and time. She appears well-developed and well-nourished. No distress. HENT:   Head: Normocephalic and atraumatic. Mouth/Throat: Oropharynx is clear and moist.   Eyes: Pupils are equal, round, and reactive to light. Conjunctivae are normal.   Neck: Neck supple. No JVD present. No tracheal deviation present. Cardiovascular: Normal rate and regular rhythm. No murmur heard. Pulmonary/Chest: Effort normal. No respiratory distress. She has no wheezes. She has no rales. She exhibits no tenderness. Diminished aeration    Abdominal: Soft. Bowel sounds are normal. She exhibits no distension. There is no tenderness. Musculoskeletal: She exhibits no edema. Lymphadenopathy:     She has no cervical adenopathy. Neurological: She is alert and oriented to person, place, and time. Skin: Skin is warm and dry. Capillary refill takes less than 2 seconds. Psychiatric: She has a normal mood and affect. Her behavior is normal. Judgment and thought content normal.   Nursing note and vitals reviewed. Test results   Lung Nodule Screening     [x] Qualifies    [] Does not qualify   [] Declined    [x] Completed    CT lung screen 9/17/18     Impression   1. 3 mm pulmonary nodule within the posterior right lower lobe abutting the pleura on axial image 201. This is compatible with a lung RADS category 2 lesion. Recommend follow-up annual screening lung CT in 12 months to document stability. 2. There are no pathologically enlarged lymph nodes.    3. LUNGRADS ASSESSMENT VALUE: 2           The LUNG RADS RECOMMENDATIONS for monitoring lung nodules listed below (ACR- Lung-RADS Version 1.0 Assessment Categories Release date\" April 28, 2014)       LUNG RADS RECOMMENDATIONS;   1.  Normal, continue annual screening   2.  Benign appearance or behavior, continue annual screening   3.  6 month CT recommended   4A.  3 month CT recommended; may consider PET/CT   4B.  Additional diagnostics and/or tissue sampling recommended   4X.  Additional diagnostics and/or tissue sampling recommended         **This report has been created using voice recognition software.  It may contain minor errors which are inherent in voice recognition technology. **       Final report electronically signed by Dr. Fanny Mercedes on 9/17/2018 1:06 PM               Assessment      Diagnosis Orders   1. Stage 3 severe COPD by GOLD classification (HCC)  Tiotropium Bromide-Olodaterol 2.5-2.5 MCG/ACT AERS   2. Personal history of tobacco use  Tiotropium Bromide-Olodaterol 2.5-2.5 MCG/ACT AERS   3.  Gastroesophageal reflux disease without esophagitis           Normal A1A screening    Plan   -Continue Stiolto for COPD maintenance, 2 samples given in office   -Will see if we can get patient set up with our patient assistance to help her with med costs  -Continue Prilosec for GERD  -results of above testing discussed with patient  -yearly CT lung screening     Will see Marcos Nogeuira back in: 6 months- will need CT lung screen ordered at that time     Electronically signed by RIGOBERTO King CNP on 10/16/2018 at 8:17 AM  10/16/2018

## 2018-10-16 ASSESSMENT — ENCOUNTER SYMPTOMS
COUGH: 0
DIARRHEA: 0
VOMITING: 0
ABDOMINAL PAIN: 0
EYES NEGATIVE: 1
NAUSEA: 0
WHEEZING: 0
SHORTNESS OF BREATH: 1

## 2018-10-23 ENCOUNTER — TELEPHONE (OUTPATIENT)
Dept: PSYCHOLOGY | Age: 68
End: 2018-10-23

## 2018-10-23 NOTE — TELEPHONE ENCOUNTER
10/23/18  Patient called sorry for missing appointment. She is doing well. Sarah Power advised and is reading and has cut down half of her medications.   Will see at next appointment on 11/7/18

## 2018-11-06 ENCOUNTER — OFFICE VISIT (OUTPATIENT)
Dept: PSYCHOLOGY | Age: 68
End: 2018-11-06
Payer: MEDICARE

## 2018-11-06 DIAGNOSIS — M79.7 FIBROMYALGIA: ICD-10-CM

## 2018-11-06 DIAGNOSIS — F33.41 DEPRESSION, MAJOR, RECURRENT, IN PARTIAL REMISSION (HCC): Primary | Chronic | ICD-10-CM

## 2018-11-06 DIAGNOSIS — F51.04 PSYCHOPHYSIOLOGICAL INSOMNIA: ICD-10-CM

## 2018-11-06 PROCEDURE — 90837 PSYTX W PT 60 MINUTES: CPT | Performed by: PSYCHOLOGIST

## 2019-04-16 ENCOUNTER — OFFICE VISIT (OUTPATIENT)
Dept: PULMONOLOGY | Age: 69
End: 2019-04-16
Payer: MEDICARE

## 2019-04-16 VITALS
SYSTOLIC BLOOD PRESSURE: 130 MMHG | OXYGEN SATURATION: 97 % | HEIGHT: 65 IN | BODY MASS INDEX: 26.82 KG/M2 | HEART RATE: 84 BPM | WEIGHT: 161 LBS | TEMPERATURE: 97.1 F | DIASTOLIC BLOOD PRESSURE: 66 MMHG

## 2019-04-16 DIAGNOSIS — R09.81 SINUS CONGESTION: ICD-10-CM

## 2019-04-16 DIAGNOSIS — F41.9 ANXIETY: ICD-10-CM

## 2019-04-16 DIAGNOSIS — J44.9 CHRONIC OBSTRUCTIVE PULMONARY DISEASE, UNSPECIFIED COPD TYPE (HCC): Primary | ICD-10-CM

## 2019-04-16 DIAGNOSIS — F17.200 TOBACCO DEPENDENCE: ICD-10-CM

## 2019-04-16 DIAGNOSIS — R07.89 OTHER CHEST PAIN: ICD-10-CM

## 2019-04-16 PROCEDURE — 99214 OFFICE O/P EST MOD 30 MIN: CPT | Performed by: NURSE PRACTITIONER

## 2019-04-16 RX ORDER — BUDESONIDE AND FORMOTEROL FUMARATE DIHYDRATE 160; 4.5 UG/1; UG/1
2 AEROSOL RESPIRATORY (INHALATION) 2 TIMES DAILY
Qty: 1 INHALER | Refills: 3 | Status: SHIPPED | OUTPATIENT
Start: 2019-04-16 | End: 2019-05-10 | Stop reason: SINTOL

## 2019-04-16 ASSESSMENT — ENCOUNTER SYMPTOMS
COUGH: 0
WHEEZING: 0
BACK PAIN: 0
CHEST TIGHTNESS: 1
ALLERGIC/IMMUNOLOGIC NEGATIVE: 1
EYES NEGATIVE: 1
DIARRHEA: 0
STRIDOR: 0
NAUSEA: 0
SHORTNESS OF BREATH: 1

## 2019-04-16 NOTE — PROGRESS NOTES
[facility]     Patient: Paige To, 79 y.o.   : 1950    Pt of Dr. Porfirio Stephens   Patient presents with    Follow-up     6 month COPD with no testing. Qualifies-completed         HPI  Betigamaliel Waldron is here for 6 month  follow up for COPD  Currently using Stiolto- compliant with 2 puffs per day, does not feel this is lasting 24 hours, takes in mornings. Stopped smoking for several years \" I fell off the wagon awhile ago\" now smoking 1 PPD   Stopped cold turkey in past, had tried Chantix, Nicoderm patches and gum- did not tolerate. 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 Still having symptoms but no cough. Using Advil cold/sinus PRN    Will use PRN saline OTC  Does not like nasal sprays   Wears mouth guard at night for TMJ. Sleeping well, off Ambien - follows with Dr Estefany Perez .    Has anxiety takes PRN Klonopin and this has better controlled than in past.     Past Medical hx   PMH:  Past Medical History:   Diagnosis Date    Acid reflux disease     COPD [facility]chronic obstructive pulmonary disease) ([facility] [address]     Depression      SURGICAL HISTORY:  Past Surgical History:   Procedure Laterality Date    APPENDECTOMY      COLONOSCOPY      CYST REMOVAL      multiple    HYSTERECTOMY      SC LAP,CHOLECYSTECTOMY N/A 2018    CHOLECYSTECTOMY LAPAROSCOPIC performed by Celsa Reynoso DO at [facility] ENDOSCOPY       SOCIAL HISTORY:  Social History     Tobacco Use    Smoking status: Current Every Day Smoker     Packs/day: 1.00     Years: 30.00     Pack years: 30. 00     Types: Cigarettes    Smokeless tobacco: Never Used   Substance Use Topics    Alcohol use: No    Drug use: No     ALLERGIES:  Allergies   Allergen Reactions    Hydrochlorothiazide Itching    Adhesive Tape Rash and Other (See Comments)     Pulls skin off     FAMILY HISTORY:  Family History   Problem Relation Age of Onset    Heart Disease Mother     Memory Loss Mother     COPD Mother     Heart Disease Father     Memory Loss Father     COPD Father     Diabetes Maternal Grandmother     Cancer Other      CURRENT MEDICATIONS:  Current Outpatient Medications   Medication Sig Dispense Refill    Furosemide (LASIX PO) Take by mouth      VENTOLIN  (90 Base) MCG/ACT inhaler Inhale 2 puffs into the lungs every 6 hours as needed for Wheezing or Shortness of Breath 3 Inhaler 3    budesonide-formoterol (SYMBICORT) 160-4.5 MCG/ACT AERO Inhale 2 puffs into the lungs 2 times daily Rinse mouth after its use. 1 Inhaler 3    clonazePAM (KLONOPIN) 0.25 MG disintegrating tablet Take 0.25 mg by mouth 2 times daily as needed.  lamoTRIgine (LAMICTAL) 100 MG tablet Take 100 mg by mouth daily      Cholecalciferol (VITAMIN D PO) Take by mouth daily      vitamin C (ASCORBIC ACID) 500 MG tablet Take 500 mg by mouth daily      albuterol (PROVENTIL) (2.5 MG/3ML) 0.083% nebulizer solution Take 2.5 mg by nebulization every 6 hours as needed for Wheezing      Pyridoxine HCl (VITAMIN B-6) 100 MG tablet Take 100 mg by mouth daily.  aspirin 81 MG EC tablet Take 81 mg by mouth daily.  folic acid (FOLVITE) 1 MG tablet Take 1 mg by mouth daily.  omeprazole (PRILOSEC) 20 MG capsule Take 40 mg by mouth Daily       Alendronate Sodium (FOSAMAX PO) Take by mouth once a week      HYDROCHLOROTHIAZIDE PO Take  by mouth daily. No current facility-administered medications for this visit. Allayne Jovita     ROS   Review of Systems   Constitutional: Negative for activity change, appetite change, chills and fever.   HENT: Positive for congestion and ear pain. Negative for postnasal drip. Eyes: Negative. Respiratory: Positive for chest tightness and shortness of breath. Negative for cough, wheezing and stridor. Cardiovascular: Positive for chest pain. Negative for leg swelling. Gastrointestinal: Negative for diarrhea and nausea. Endocrine: Negative. Genitourinary: Negative. Musculoskeletal: Negative. Negative for arthralgias and back pain. Skin: Negative. Allergic/Immunologic: Negative. Neurological: Positive for headaches. Negative for dizziness and light-headedness. Hematological: Negative. Psychiatric/Behavioral: Negative. All other systems reviewed and are negative. Physical exam   /66 (Site: Left Upper Arm, Position: Sitting, Cuff Size: Large Adult)   Pulse 84   Temp 97.1 °F (36.2 °C)   Ht 5' 5\" (1.651 m)   Wt 161 lb (73 kg)   SpO2 97% Comment: on RA  BMI 26.79 kg/m²        Physical Exam   Constitutional: She is oriented to person, place, and time. She appears well-developed and well-nourished. HENT:   Head: Normocephalic and atraumatic. Mouth/Throat: Oropharynx is clear and moist. No oropharyngeal exudate. Eyes: Conjunctivae are normal.   Neck: Normal range of motion. Neck supple. No tracheal deviation present. Cardiovascular: Normal rate, regular rhythm and normal heart sounds. No murmur heard. Pulmonary/Chest: Effort normal and breath sounds normal. No respiratory distress. She has no wheezes. She has no rales. She exhibits no tenderness. Abdominal: Soft. She exhibits no distension. There is no tenderness. Musculoskeletal: Normal range of motion. She exhibits no edema. Neurological: She is alert and oriented to person, place, and time. Coordination normal.   Skin: Skin is warm and dry. Capillary refill takes less than 2 seconds. Psychiatric: She has a normal mood and affect.  Her behavior is normal. Judgment and thought content normal. Nursing note and vitals reviewed. Test results   Lung Nodule Screening     [x] Qualifies    []Does not qualify   [] Declined    [] Completed       Assessment      Diagnosis Orders   1. Chronic obstructive pulmonary disease, unspecified COPD type ([facility] [address]  Full PFT Study With Bronchodilator    XR CHEST STANDARD (2 VW)   2. Other chest pain  XR CHEST STANDARD (2 VW)   3. Tobacco dependence  Full PFT Study With Bronchodilator    XR CHEST STANDARD (2 VW)   4. Anxiety     5. Sinus congestion           Plan   LDCT lung screen due in Sept 2019- needs ordered at follow up  CXR 2 view now to evaluate lung fields due to chest pain ( had cardiac per PCP) -will call patient with results   Continue OTC saline, declines any further nasal sprays  Stop Stiolto due to ineffectiveness per patient  Start Symbicort 160/4.5 mcg 2 puffs BID  PRN albuterol HFA/ Nebs  Full PFT   Recommend patient stop smoking,Smoking cessation discussed for 4 min,  Educated patient on health hazards and negative effects of smoking. Counseled on ways to quit,  Offered cessation assistance with prescription mediations,  over the counter remedies, community resources/ phone APPs.   Patient refuses additional assistance at this time, wants to quit cold turkey as she did in the past     Will see Aracelis Mcnamara in: 3 months to review PFT     Electronically signed by RIGOBERTO Franco CNP on 4/16/2019 at 1:41 PM

## 2019-04-16 NOTE — PATIENT INSTRUCTIONS
Patient Education        Learning About Benefits From Quitting Smoking  How does quitting smoking make you healthier? If you're thinking about quitting smoking, you may have a few reasons to be smoke-free. Your health may be one of them. · When you quit smoking, you lower your risks for cancer, lung disease, heart attack, stroke, blood vessel disease, and blindness from macular degeneration. · When you're smoke-free, you get sick less often, and you heal faster. You are less likely to get colds, flu, bronchitis, and pneumonia. · As a nonsmoker, you may find that your mood is better and you are less stressed. When and how will you feel healthier? Quitting has real health benefits that start from day 1 of being smoke-free. And the longer you stay smoke-free, the healthier you get and the better you feel. The first hours  · After just 20 minutes, your blood pressure and heart rate go down. That means there's less stress on your heart and blood vessels. · Within 12 hours, the level of carbon monoxide in your blood drops back to normal. That makes room for more oxygen. With more oxygen in your body, you may notice that you have more energy than when you smoked. After 2 weeks  · Your lungs start to work better. · Your risk of heart attack starts to drop. After 1 month  · When your lungs are clear, you cough less and breathe deeper, so it's easier to be active. · Your sense of taste and smell return. That means you can enjoy food more than you have since you started smoking. Over the years  · After 1 year, your risk of heart disease is half what it would be if you kept smoking. · After 5 years, your risk of stroke starts to shrink. Within a few years after that, it's about the same as if you'd never smoked. · After 10 years, your risk of dying from lung cancer is cut by about half. And your risk for many other types of cancer is lower too. How would quitting help others in your life?   When you quit smoking, you improve the health of everyone who now breathes in your smoke. · Their heart, lung, and cancer risks drop, much like yours. · They are sick less. For babies and small children, living smoke-free means they're less likely to have ear infections, pneumonia, and bronchitis. · If you're a woman who is or will be pregnant someday, quitting smoking means a healthier . · Children who are close to you are less likely to become adult smokers. Where can you learn more? Go to https://[employer]. org and sign in to your [employer] account. Enter [phone] in the [facility] box to learn more about \"Learning About Benefits From Quitting Smoking. \"     If you do not have an account, please click on the \"Sign Up Now\" link. Current as of: 2018  Content Version: 11.9  © 1491-0376 [employer]. Care instructions adapted under license by [facility] ([facility]). If you have questions about a medical condition or this instruction, always ask your healthcare professional. [address] any warranty or liability for your use of this information.

## 2019-05-08 ENCOUNTER — TELEPHONE (OUTPATIENT)
Dept: PULMONOLOGY | Age: 69
End: 2019-05-08

## 2019-05-08 NOTE — TELEPHONE ENCOUNTER
Patient is calling in regarding her Symbicort, it is making her mouth sore and she does not like it. She would like to go back on the Stiolyo 2.5 mg.     Pharmacy is [facility]    Please advise patient  [phone]

## 2019-05-09 NOTE — TELEPHONE ENCOUNTER
Spoke with patient, she was rinsing her mouth while using this medication she started to swell. She said she stopped taking about two weeks ago and she still has some swelling but its finally going away.

## 2019-07-17 ENCOUNTER — HOSPITAL ENCOUNTER (OUTPATIENT)
Age: 69
Discharge: HOME | End: 2019-07-17
Payer: MEDICARE

## 2019-07-17 ENCOUNTER — HOSPITAL ENCOUNTER (OUTPATIENT)
Dept: GENERAL RADIOLOGY | Age: 69
Discharge: HOME | End: 2019-07-17
Payer: MEDICARE

## 2019-07-17 DIAGNOSIS — R07.89 OTHER CHEST PAIN: ICD-10-CM

## 2019-07-17 DIAGNOSIS — J44.9 CHRONIC OBSTRUCTIVE PULMONARY DISEASE, UNSPECIFIED COPD TYPE (HCC): ICD-10-CM

## 2019-07-17 DIAGNOSIS — F17.200 TOBACCO DEPENDENCE: ICD-10-CM

## 2019-07-17 PROCEDURE — 71046 X-RAY EXAM CHEST 2 VIEWS: CPT

## 2019-08-05 ENCOUNTER — TELEPHONE (OUTPATIENT)
Dept: PULMONOLOGY | Age: 69
End: 2019-08-05

## 2019-08-05 NOTE — TELEPHONE ENCOUNTER
----- Message from RIGOBERTO Rivera CNP sent at 8/5/2019  2:20 PM EDT -----  Please notify patient that her CXR done 7/17/19 shows emphysema but no acute findings, nothing to suggest any infection

## 2019-08-06 ENCOUNTER — HOSPITAL ENCOUNTER (OUTPATIENT)
Dept: PULMONOLOGY | Age: 69
Discharge: HOME | End: 2019-08-06
Payer: MEDICARE

## 2019-08-06 DIAGNOSIS — F17.200 TOBACCO DEPENDENCE: ICD-10-CM

## 2019-08-06 DIAGNOSIS — J44.9 CHRONIC OBSTRUCTIVE PULMONARY DISEASE, UNSPECIFIED COPD TYPE (HCC): ICD-10-CM

## 2019-08-06 PROCEDURE — 94729 DIFFUSING CAPACITY: CPT

## 2019-08-06 PROCEDURE — 94726 PLETHYSMOGRAPHY LUNG VOLUMES: CPT

## 2019-08-06 PROCEDURE — 2709999900 HC NON-CHARGEABLE SUPPLY

## 2019-08-06 PROCEDURE — 94060 EVALUATION OF WHEEZING: CPT

## 2019-08-20 ENCOUNTER — OFFICE VISIT (OUTPATIENT)
Dept: PULMONOLOGY | Age: 69
End: 2019-08-20
Payer: MEDICARE

## 2019-08-20 VITALS
BODY MASS INDEX: 27.76 KG/M2 | TEMPERATURE: 98.4 F | HEIGHT: 65 IN | SYSTOLIC BLOOD PRESSURE: 118 MMHG | DIASTOLIC BLOOD PRESSURE: 62 MMHG | OXYGEN SATURATION: 97 % | HEART RATE: 87 BPM | WEIGHT: 166.6 LBS

## 2019-08-20 DIAGNOSIS — J44.9 STAGE 2 MODERATE COPD BY GOLD CLASSIFICATION (HCC): ICD-10-CM

## 2019-08-20 DIAGNOSIS — Z87.891 PERSONAL HISTORY OF TOBACCO USE: Primary | ICD-10-CM

## 2019-08-20 DIAGNOSIS — K21.9 GASTROESOPHAGEAL REFLUX DISEASE WITHOUT ESOPHAGITIS: ICD-10-CM

## 2019-08-20 PROCEDURE — G0296 VISIT TO DETERM LDCT ELIG: HCPCS | Performed by: NURSE PRACTITIONER

## 2019-08-20 PROCEDURE — 99213 OFFICE O/P EST LOW 20 MIN: CPT | Performed by: NURSE PRACTITIONER

## 2019-08-20 RX ORDER — POTASSIUM CHLORIDE 750 MG/1
10 CAPSULE, EXTENDED RELEASE ORAL DAILY
COMMUNITY
End: 2022-07-19

## 2019-08-20 ASSESSMENT — ENCOUNTER SYMPTOMS
ABDOMINAL PAIN: 0
WHEEZING: 0
DIARRHEA: 0
VOMITING: 0
SHORTNESS OF BREATH: 1
NAUSEA: 0
COUGH: 1
EYES NEGATIVE: 1

## 2019-08-20 NOTE — PROGRESS NOTES
Mother     Heart Disease Father     Memory Loss Father     COPD Father     Diabetes Maternal Grandmother     Cancer Other      CURRENT MEDICATIONS:  Current Outpatient Medications   Medication Sig Dispense Refill    potassium chloride (MICRO-K) 10 MEQ extended release capsule Take 10 mEq by mouth daily      Tiotropium Bromide-Olodaterol (STIOLTO RESPIMAT) 2.5-2.5 MCG/ACT AERS Inhale 2 puffs into the lungs daily 3 Inhaler 3    Furosemide (LASIX PO) Take by mouth      VENTOLIN  (90 Base) MCG/ACT inhaler Inhale 2 puffs into the lungs every 6 hours as needed for Wheezing or Shortness of Breath 3 Inhaler 3    lamoTRIgine (LAMICTAL) 100 MG tablet Take 100 mg by mouth daily      Cholecalciferol (VITAMIN D PO) Take by mouth daily      vitamin C (ASCORBIC ACID) 500 MG tablet Take 500 mg by mouth daily      albuterol (PROVENTIL) (2.5 MG/3ML) 0.083% nebulizer solution Take 2.5 mg by nebulization every 6 hours as needed for Wheezing      Pyridoxine HCl (VITAMIN B-6) 100 MG tablet Take 100 mg by mouth daily.  aspirin 81 MG EC tablet Take 81 mg by mouth daily.  folic acid (FOLVITE) 1 MG tablet Take 1 mg by mouth daily.  omeprazole (PRILOSEC) 20 MG capsule Take 40 mg by mouth Daily       clonazePAM (KLONOPIN) 0.25 MG disintegrating tablet Take 0.25 mg by mouth 2 times daily as needed.  Alendronate Sodium (FOSAMAX PO) Take by mouth once a week      HYDROCHLOROTHIAZIDE PO Take  by mouth daily. No current facility-administered medications for this visit. Vincenzo SAM   Review of Systems   Constitutional: Negative for chills and fever. HENT: Positive for congestion. Eyes: Negative. Respiratory: Positive for cough and shortness of breath (with exertion). Negative for wheezing. Cardiovascular: Negative for chest pain, palpitations and leg swelling. Gastrointestinal: Negative for abdominal pain, diarrhea, nausea and vomiting. Genitourinary: Negative.     Musculoskeletal: Negative. Skin: Negative. Neurological: Negative. Hematological: Does not bruise/bleed easily. Psychiatric/Behavioral: Negative for suicidal ideas. Physical exam   /62 (Site: Left Upper Arm, Position: Sitting, Cuff Size: Medium Adult)   Pulse 87   Temp 98.4 °F (36.9 °C) (Oral)   Ht 5' 5\" (1.651 m)   Wt 166 lb 9.6 oz (75.6 kg)   SpO2 97% Comment: room air at rest  BMI 27.72 kg/m²        Physical Exam   Constitutional: She is oriented to person, place, and time. She appears well-developed and well-nourished. No distress. HENT:   Mouth/Throat: No oropharyngeal exudate. Eyes: Conjunctivae are normal. Right eye exhibits no discharge. No scleral icterus. Neck: Neck supple. No JVD present. Cardiovascular: Normal rate and regular rhythm. Exam reveals no friction rub. No murmur heard. Pulmonary/Chest: Effort normal and breath sounds normal. No respiratory distress. She has no wheezes. She has no rales. She exhibits no tenderness. Abdominal: Soft. Musculoskeletal: She exhibits no edema or tenderness. Lymphadenopathy:     She has no cervical adenopathy. Neurological: She is alert and oriented to person, place, and time. Skin: Skin is warm and dry. Capillary refill takes less than 2 seconds. Psychiatric: She has a normal mood and affect. Her behavior is normal. Judgment and thought content normal.   Nursing note and vitals reviewed. Test results   Lung Nodule Screening     [x] Qualifies    []Does not qualify   [] Declined    [x] Completed 9/2018    FEV 1 and FEV1 /FVC ratio  has shown improvement from 2017                   CXR 7/17/19     Impression       There is suggestion of emphysematous changes within the lungs. No acute intrathoracic abnormality is identified. Assessment      Diagnosis Orders   1. Personal history of tobacco use  OK VISIT TO DISCUSS LUNG CA SCREEN W LDCT    CT Lung Screen (Annual)    CT LUNG SCREENING   2.  Stage 2 moderate COPD by GOLD

## 2019-08-26 NOTE — PATIENT INSTRUCTIONS
Cholecystectomy: Before Your Surgery  What is cholecystectomy? Cholecystectomy (fp-szy-hpo-CARLA-tuh-radha) is a type of surgery. It removes a diseased gallbladder. This surgery is usually done as a laparoscopic surgery. The doctor puts a lighted tube and other surgical tools through small cuts (incisions) in your belly. The tube is called a scope. It lets your doctor see your organs so he or she can do the surgery. The incisions leave scars that fade with time. Most people go home the same day. You probably will feel better each day. Most people have only a small amount of pain after 1 week. If you have a desk job, you can probably go back to work in 1 to 2 weeks. If you lift heavy objects or have a very active job, it may take up to 4 weeks. In some cases, open surgery is the best choice. Your doctor may choose open surgery in advance. Or he or she may choose it in the middle of laparoscopic surgery. In open surgery, the doctor makes a larger incision in your upper belly. If you have open surgery, you will probably stay in the hospital for 2 to 4 days. And it may take 4 to 6 weeks to get back to your normal routine. Follow-up care is a key part of your treatment and safety. Be sure to make and go to all appointments, and call your doctor if you are having problems. It's also a good idea to know your test results and keep a list of the medicines you take. What happens before surgery? ?Surgery can be stressful. This information will help you understand what you can expect. And it will help you safely prepare for surgery. ? Preparing for surgery  ? · Understand exactly what surgery is planned, along with the risks, benefits, and other options. · Tell your doctors ALL the medicines, vitamins, supplements, and herbal remedies you take. Some of these can increase the risk of bleeding or interact with anesthesia. ?  · If you take blood thinners, such as warfarin (Coumadin), clopidogrel (Plavix), or aspirin, be
no

## 2019-11-12 ENCOUNTER — HOSPITAL ENCOUNTER (OUTPATIENT)
Dept: MAMMOGRAPHY | Age: 69
Discharge: HOME | End: 2019-11-12
Payer: MEDICARE

## 2019-11-12 ENCOUNTER — HOSPITAL ENCOUNTER (OUTPATIENT)
Dept: CT IMAGING | Age: 69
Discharge: HOME | End: 2019-11-12
Payer: MEDICARE

## 2019-11-12 DIAGNOSIS — Z87.891 PERSONAL HISTORY OF TOBACCO USE: ICD-10-CM

## 2019-11-12 DIAGNOSIS — Z12.39 SCREENING BREAST EXAMINATION: ICD-10-CM

## 2019-11-12 PROCEDURE — G0297 LDCT FOR LUNG CA SCREEN: HCPCS

## 2019-11-12 PROCEDURE — 77063 BREAST TOMOSYNTHESIS BI: CPT

## 2019-11-13 ENCOUNTER — HOSPITAL ENCOUNTER (OUTPATIENT)
Dept: WOMENS IMAGING | Age: 69
Discharge: HOME | End: 2019-11-13
Payer: MEDICARE

## 2019-11-13 DIAGNOSIS — Z00.6 ENCOUNTER FOR EXAMINATION FOR NORMAL COMPARISON OR CONTROL IN CLINICAL RESEARCH PROGRAM: ICD-10-CM

## 2019-11-13 PROCEDURE — 3209999900 MAM COMPARISON OF OUTSIDE IMAGES

## 2019-11-20 ENCOUNTER — OFFICE VISIT (OUTPATIENT)
Dept: PULMONOLOGY | Age: 69
End: 2019-11-20
Payer: MEDICARE

## 2019-11-20 VITALS
HEIGHT: 65 IN | TEMPERATURE: 99.4 F | WEIGHT: 169.4 LBS | HEART RATE: 85 BPM | BODY MASS INDEX: 28.22 KG/M2 | SYSTOLIC BLOOD PRESSURE: 122 MMHG | OXYGEN SATURATION: 98 % | DIASTOLIC BLOOD PRESSURE: 72 MMHG

## 2019-11-20 DIAGNOSIS — F17.200 TOBACCO DEPENDENCE: ICD-10-CM

## 2019-11-20 DIAGNOSIS — J44.9 STAGE 2 MODERATE COPD BY GOLD CLASSIFICATION (HCC): ICD-10-CM

## 2019-11-20 DIAGNOSIS — R91.1 NODULE OF RIGHT LUNG: Primary | ICD-10-CM

## 2019-11-20 DIAGNOSIS — K21.9 GASTROESOPHAGEAL REFLUX DISEASE WITHOUT ESOPHAGITIS: ICD-10-CM

## 2019-11-20 PROCEDURE — 99214 OFFICE O/P EST MOD 30 MIN: CPT | Performed by: NURSE PRACTITIONER

## 2019-11-20 ASSESSMENT — ENCOUNTER SYMPTOMS
NAUSEA: 0
WHEEZING: 0
BACK PAIN: 0
STRIDOR: 0
SHORTNESS OF BREATH: 1
COUGH: 1
ALLERGIC/IMMUNOLOGIC NEGATIVE: 1
CHEST TIGHTNESS: 0
DIARRHEA: 0
EYES NEGATIVE: 1

## 2020-01-02 ENCOUNTER — TELEPHONE (OUTPATIENT)
Dept: PULMONOLOGY | Age: 70
End: 2020-01-02

## 2020-01-02 NOTE — TELEPHONE ENCOUNTER
Called pt to let her know the Ventolin was called in for her. She stated that is not what she wanted because she can't afford that and her insurance won't cover. I asked her if she knew what they covered and she stated the Proair inhaler is. Please advise.

## 2020-01-02 NOTE — TELEPHONE ENCOUNTER
Bevespi inhaler doesn't work per patient. Hussaindavid Killer called requesting a refill on the following medications:  Requested Prescriptions     Pending Prescriptions Disp Refills    VENTOLIN  (90 Base) MCG/ACT inhaler 3 Inhaler 3     Sig: Inhale 2 puffs into the lungs every 6 hours as needed for Wheezing or Shortness of Breath     [email or web address]      Date of last visit: 11/20/19  Date of next visit (if applicable): 0/52/3845      Patient stated Ventolin isn't covered, would like 3 month supply. Completely out.

## 2020-04-03 RX ORDER — TIOTROPIUM BROMIDE AND OLODATEROL 3.124; 2.736 UG/1; UG/1
SPRAY, METERED RESPIRATORY (INHALATION)
Qty: 4 G | Refills: 3 | Status: SHIPPED | OUTPATIENT
Start: 2020-04-03 | End: 2020-04-22 | Stop reason: SDUPTHER

## 2020-04-22 RX ORDER — TIOTROPIUM BROMIDE AND OLODATEROL 3.124; 2.736 UG/1; UG/1
2 SPRAY, METERED RESPIRATORY (INHALATION) DAILY
Qty: 3 INHALER | Refills: 3 | Status: SHIPPED | OUTPATIENT
Start: 2020-04-22 | End: 2021-03-22

## 2020-05-15 ENCOUNTER — HOSPITAL ENCOUNTER (OUTPATIENT)
Dept: CT IMAGING | Age: 70
Discharge: HOME | End: 2020-05-15
Payer: MEDICARE

## 2020-05-15 PROCEDURE — 71250 CT THORAX DX C-: CPT

## 2020-05-20 ENCOUNTER — VIRTUAL VISIT (OUTPATIENT)
Dept: PULMONOLOGY | Age: 70
End: 2020-05-20
Payer: MEDICARE

## 2020-05-20 PROCEDURE — 99214 OFFICE O/P EST MOD 30 MIN: CPT | Performed by: NURSE PRACTITIONER

## 2020-05-20 RX ORDER — BUDESONIDE AND FORMOTEROL FUMARATE DIHYDRATE 160; 4.5 UG/1; UG/1
1 AEROSOL RESPIRATORY (INHALATION) DAILY
COMMUNITY
End: 2020-06-21

## 2020-05-20 RX ORDER — ALBUTEROL SULFATE 90 UG/1
2 POWDER, METERED RESPIRATORY (INHALATION) EVERY 4 HOURS PRN
Qty: 1 INHALER | Refills: 3 | Status: SHIPPED
Start: 2020-05-20 | End: 2020-08-11

## 2020-05-20 ASSESSMENT — ENCOUNTER SYMPTOMS
NAUSEA: 0
BLOOD IN STOOL: 0
COUGH: 0
WHEEZING: 0
DIARRHEA: 0
SHORTNESS OF BREATH: 1
VOMITING: 0
EYES NEGATIVE: 1
CONSTIPATION: 0
ABDOMINAL PAIN: 0
ABDOMINAL DISTENTION: 0
CHEST TIGHTNESS: 0

## 2020-05-20 NOTE — PROGRESS NOTES
distention, abdominal pain, blood in stool, constipation, diarrhea, nausea and vomiting. Genitourinary: Negative. Musculoskeletal: Negative. Skin: Negative. Neurological: Negative. Hematological: Does not bruise/bleed easily. Psychiatric/Behavioral: Negative for suicidal ideas. Physical exam   There were no vitals taken for this visit. Physical Exam  Constitutional:       Appearance: Normal appearance. HENT:      Head: Normocephalic and atraumatic. Eyes:      Conjunctiva/sclera: Conjunctivae normal.   Pulmonary:      Effort: No tachypnea, bradypnea or respiratory distress. Neurological:      Mental Status: She is alert and oriented to person, place, and time. Psychiatric:         Attention and Perception: Attention normal.         Mood and Affect: Mood normal.         Speech: Speech normal.         Behavior: Behavior normal.         Thought Content: Thought content normal.         Cognition and Memory: Cognition normal.         Judgment: Judgment normal.          Test results   Lung Nodule Screening     [x] Qualifies    []Does not qualify   [] Declined    [x] Completed    CT chest 5/15/88536      FINDINGS:        Upper abdomen: Prior cholecystectomy. Inhomogeneous appearance of the liver with a few prominent areas of diminished attenuation. Cannot exclude primary malignancy or metastatic disease. Routine CT abdomen and pelvis with contrast enhancement recommended    for further evaluation. Small hiatus hernia.       Mediastinum and cain: Unremarkable. No hilar or mediastinal adenopathy.       Bones: Mild levoscoliosis entire thoracic spine. Cannot exclude muscle spasm. No evidence for acute fracture or bone destruction.       Lungs: Unremarkable. No infiltrates or effusions are seen. No lung nodules are seen at this time.           Impression   1. No lung nodules are seen at this time.  No acute finding in the chest.   2. Abnormal appearance of the liver, suggesting possible

## 2020-05-28 ENCOUNTER — HOSPITAL ENCOUNTER (OUTPATIENT)
Age: 70
Discharge: HOME | End: 2020-05-28
Payer: MEDICARE

## 2020-05-28 ENCOUNTER — HOSPITAL ENCOUNTER (OUTPATIENT)
Dept: CT IMAGING | Age: 70
Discharge: HOME | End: 2020-05-28
Payer: MEDICARE

## 2020-06-03 ENCOUNTER — HOSPITAL ENCOUNTER (OUTPATIENT)
Dept: MAMMOGRAPHY | Age: 70
Discharge: HOME | End: 2020-06-03
Payer: MEDICARE

## 2020-06-03 ENCOUNTER — HOSPITAL ENCOUNTER (OUTPATIENT)
Dept: CT IMAGING | Age: 70
Discharge: HOME | End: 2020-06-03
Payer: MEDICARE

## 2020-06-03 ENCOUNTER — HOSPITAL ENCOUNTER (OUTPATIENT)
Age: 70
Discharge: HOME | End: 2020-06-03
Payer: MEDICARE

## 2020-06-03 DIAGNOSIS — R93.89 ABNORMAL CT OF THE CHEST: ICD-10-CM

## 2020-06-03 LAB
CREATININE, WHOLE BLOOD: 0.9 MG/DL (ref 0.5–1.2)
ESTIMATED GFR, PCACC: 66 ML/MIN/1.73M2

## 2020-06-03 PROCEDURE — 6360000004 HC RX CONTRAST MEDICATION: Performed by: NURSE PRACTITIONER

## 2020-06-03 PROCEDURE — 82565 ASSAY OF CREATININE: CPT

## 2020-06-03 PROCEDURE — 74178 CT ABD&PLV WO CNTR FLWD CNTR: CPT

## 2020-06-03 RX ADMIN — IOHEXOL 20 ML: 240 INJECTION, SOLUTION INTRATHECAL; INTRAVASCULAR; INTRAVENOUS; ORAL at 08:59

## 2020-06-03 RX ADMIN — IOPAMIDOL 100 ML: 755 INJECTION, SOLUTION INTRAVENOUS at 08:59

## 2020-06-08 ENCOUNTER — VIRTUAL VISIT (OUTPATIENT)
Dept: PULMONOLOGY | Age: 70
End: 2020-06-08
Payer: MEDICARE

## 2020-06-08 PROCEDURE — 99213 OFFICE O/P EST LOW 20 MIN: CPT | Performed by: NURSE PRACTITIONER

## 2020-06-08 RX ORDER — ALBUTEROL SULFATE 2.5 MG/3ML
2.5 SOLUTION RESPIRATORY (INHALATION) EVERY 6 HOURS PRN
Qty: 120 VIAL | Refills: 11 | Status: SHIPPED | OUTPATIENT
Start: 2020-06-08 | End: 2021-09-14

## 2020-06-08 ASSESSMENT — ENCOUNTER SYMPTOMS
SHORTNESS OF BREATH: 1
COUGH: 0
DIARRHEA: 0
VOMITING: 0
CHEST TIGHTNESS: 0
NAUSEA: 0
ABDOMINAL PAIN: 0
EYES NEGATIVE: 1
WHEEZING: 0

## 2020-06-15 ENCOUNTER — HOSPITAL ENCOUNTER (OUTPATIENT)
Dept: MRI IMAGING | Age: 70
Discharge: HOME | End: 2020-06-15
Payer: MEDICARE

## 2020-06-15 PROCEDURE — A9579 GAD-BASE MR CONTRAST NOS,1ML: HCPCS | Performed by: NURSE PRACTITIONER

## 2020-06-15 PROCEDURE — 6360000004 HC RX CONTRAST MEDICATION: Performed by: NURSE PRACTITIONER

## 2020-06-15 PROCEDURE — 74183 MRI ABD W/O CNTR FLWD CNTR: CPT

## 2020-06-15 RX ADMIN — GADOTERIDOL 15 ML: 279.3 INJECTION, SOLUTION INTRAVENOUS at 11:20

## 2020-06-21 ENCOUNTER — HOSPITAL ENCOUNTER (EMERGENCY)
Age: 70
Discharge: HOME | End: 2020-06-21
Attending: EMERGENCY MEDICINE
Payer: MEDICARE

## 2020-06-21 VITALS
HEIGHT: 65 IN | RESPIRATION RATE: 16 BRPM | OXYGEN SATURATION: 95 % | TEMPERATURE: 97 F | SYSTOLIC BLOOD PRESSURE: 123 MMHG | HEART RATE: 100 BPM | WEIGHT: 175 LBS | DIASTOLIC BLOOD PRESSURE: 73 MMHG | BODY MASS INDEX: 29.16 KG/M2

## 2020-06-21 PROCEDURE — 99282 EMERGENCY DEPT VISIT SF MDM: CPT

## 2020-06-21 PROCEDURE — 6370000000 HC RX 637 (ALT 250 FOR IP): Performed by: EMERGENCY MEDICINE

## 2020-06-21 RX ORDER — CALCIUM CARBONATE 500(1250)
500 TABLET ORAL DAILY
COMMUNITY

## 2020-06-21 RX ORDER — AMOXICILLIN 250 MG/1
500 CAPSULE ORAL ONCE
Status: COMPLETED | OUTPATIENT
Start: 2020-06-21 | End: 2020-06-21

## 2020-06-21 RX ORDER — AMOXICILLIN 500 MG/1
500 CAPSULE ORAL 3 TIMES DAILY
Qty: 30 CAPSULE | Refills: 0 | Status: SHIPPED | OUTPATIENT
Start: 2020-06-21 | End: 2020-07-01

## 2020-06-21 RX ADMIN — AMOXICILLIN 500 MG: 250 CAPSULE ORAL at 08:44

## 2020-06-21 ASSESSMENT — ENCOUNTER SYMPTOMS
SORE THROAT: 0
GASTROINTESTINAL NEGATIVE: 1
RESPIRATORY NEGATIVE: 1

## 2020-06-21 ASSESSMENT — PAIN DESCRIPTION - ORIENTATION: ORIENTATION: RIGHT

## 2020-06-21 ASSESSMENT — PAIN DESCRIPTION - LOCATION: LOCATION: JAW

## 2020-06-21 NOTE — ED NOTES
Pt presents amb per self. Complains of severe pain rt jaw, neck while she eats. Denies any dental issues. Swelling was severe this am while eating and has decreased since. No increase warmth or redness noted.      Cris Acosta RN  06/21/20 [id number]

## 2020-06-21 NOTE — ED NOTES
Denies any trouble swallowing. States has pain in neck while turning her head.      Rafael Amado RN  06/21/20 4420

## 2020-06-21 NOTE — ED PROVIDER NOTES
[facility]  eMERGENCY dEPARTMENT eNCOUnter             Srinath [address]oo [address] COMPLAINT    Chief Complaint   Patient presents with    Jaw Pain       Nurses Notes reviewed and I agree except as noted in the HPI. HPI    Cyndie Gallardo is a 81 y.o. female who presents stating that several times recently, while eating, she is developed swelling and pain in the right \"jaw\". It happened to her this morning and was especially bad, so she decided to come in for evaluation. Symptoms are now improving. Current pain is 5/10, aching, just in front of the left ear and under the left jaw. REVIEW OF SYSTEMS      Review of Systems   Constitutional: Positive for malaise/fatigue. Negative for fever. HENT: Positive for ear pain. Negative for congestion and sore throat. Respiratory: Negative. Gastrointestinal: Negative. Skin: Negative for rash. Neurological: Negative. All other systems reviewed and are negative. PAST MEDICAL HISTORY     has a past medical history of Acid reflux disease, COPD (chronic obstructive pulmonary disease) ([address] and Depression. SURGICAL HISTORY     has a past surgical history that includes Appendectomy; Hysterectomy; Colonoscopy; Tubal ligation; Upper gastrointestinal endoscopy; cyst removal; and pr lap,cholecystectomy (N/A, 1/29/2018). CURRENT MEDICATIONS    Previous Medications    ALBUTEROL (PROVENTIL) (2.5 MG/3ML) 0.083% NEBULIZER SOLUTION    Take 2.5 mg by nebulization every 6 hours as needed for Wheezing    ALBUTEROL (PROVENTIL) (2.5 MG/3ML) 0.083% NEBULIZER SOLUTION    Take 3 mLs by nebulization every 6 hours as needed for Wheezing or Shortness of Breath    ASPIRIN 81 MG EC TABLET    Take 81 mg by mouth daily.       CALCIUM CARBONATE (OSCAL) 500 MG TABS TABLET    Take 500 mg by mouth daily    CHOLECALCIFEROL (VITAMIN D PO)    Take by mouth daily    CLONAZEPAM (KLONOPIN) 0.25 MG DISINTEGRATING TABLET    Take 0.25 mg by mouth 2 times daily as needed. FOLIC ACID (FOLVITE) 1 MG TABLET    Take 1 mg by mouth daily. FUROSEMIDE (LASIX PO)    Take by mouth    LAMOTRIGINE (LAMICTAL) 100 MG TABLET    Take 100 mg by mouth daily    OMEPRAZOLE (PRILOSEC) 20 MG CAPSULE    Take 40 mg by mouth Daily     POTASSIUM CHLORIDE (MICRO-K) 10 MEQ EXTENDED RELEASE CAPSULE    Take 10 mEq by mouth daily    PROAIR RESPICLICK 580 (90 BASE) MCG/ACT AEROSOL POWDER INHALATION    Inhale 2 puffs into the lungs every 4 hours as needed for Wheezing or Shortness of Breath    PYRIDOXINE HCL (VITAMIN B-6) 100 MG TABLET    Take 100 mg by mouth daily. TIOTROPIUM BROMIDE-OLODATEROL (STIOLTO RESPIMAT) 2.5-2.5 MCG/ACT AERS    Inhale 2 puffs into the lungs daily    VITAMIN C (ASCORBIC ACID) 500 MG TABLET    Take 500 mg by mouth daily       ALLERGIES    is allergic to hydrochlorothiazide and adhesive tape. FAMILY HISTORY    She indicated that her mother is . She indicated that her father is . She indicated that the status of her maternal grandmother is unknown. She indicated that the status of her other is unknown.   family history includes COPD in her father and mother; Cancer in an other family member; Diabetes in her maternal grandmother; Heart Disease in her father and mother; Memory Loss in her father and mother. SOCIAL HISTORY     reports that she has been smoking cigarettes. She has a 30.00 pack-year smoking history. She has never used smokeless tobacco. She reports that she does not drink alcohol or use drugs. PHYSICAL EXAM       INITIAL VITALS: /73   Pulse 100   Temp 97 °F (36.1 °C) (Temporal)   Resp 16   Ht 5' 5\" (1.651 m)   Wt 175 lb (79.4 kg)   SpO2 95%   BMI 29.12 kg/m²      Physical Exam  Vitals signs and nursing note reviewed. Constitutional:       General: She is not in acute distress. Appearance: Normal appearance. HENT:      Head: Atraumatic.       Right Ear: Tympanic membrane and ear canal normal.      Left

## 2020-06-21 NOTE — ED NOTES
Pt released ambulatory in stable condition. Resp easy, non-labored. Skin warm, dry. Color pink. AVS and scripts reviewed. Pt voiced understanding.      Lolita Veras RN  06/21/20 0900

## 2020-06-22 ENCOUNTER — CARE COORDINATION (OUTPATIENT)
Dept: CARE COORDINATION | Age: 70
End: 2020-06-22

## 2020-07-06 ENCOUNTER — CARE COORDINATION (OUTPATIENT)
Dept: CARE COORDINATION | Age: 70
End: 2020-07-06

## 2020-07-06 NOTE — CARE COORDINATION
You Patient resolved from the Care Transitions episode on 7/6/20  Discussed COVID-19 related testing which was not done at this time. Test results were not done. Patient informed of results, if available? No    Patient/family has been provided the following resources and education related to COVID-19:                         Signs, symptoms and red flags related to COVID-19            CDC exposure and quarantine guidelines            [facility] exposure contact - [phone]            Contact for their local Department of Health                 Patient currently reports that the following symptoms have improved:  no new/worsening symptoms     No further outreach scheduled with this CTN/ACM. Episode of Care resolved. Patient has this CTN/ACM contact information if future needs arise.

## 2020-07-07 ENCOUNTER — OFFICE VISIT (OUTPATIENT)
Dept: ENT CLINIC | Age: 70
End: 2020-07-07
Payer: MEDICARE

## 2020-07-07 VITALS
RESPIRATION RATE: 14 BRPM | SYSTOLIC BLOOD PRESSURE: 120 MMHG | DIASTOLIC BLOOD PRESSURE: 72 MMHG | HEART RATE: 66 BPM | BODY MASS INDEX: 28.96 KG/M2 | TEMPERATURE: 96.3 F | WEIGHT: 174 LBS

## 2020-07-07 PROBLEM — F17.200 TOBACCO DEPENDENCE SYNDROME: Status: ACTIVE | Noted: 2020-07-07

## 2020-07-07 PROBLEM — K11.20 PAROTITIS NOT DUE TO MUMPS: Status: ACTIVE | Noted: 2020-07-07

## 2020-07-07 PROBLEM — F33.41 RECURRENT MAJOR DEPRESSION IN PARTIAL REMISSION (HCC): Status: ACTIVE | Noted: 2018-10-02

## 2020-07-07 PROBLEM — Z87.19 H/O: LIVER DISEASE: Status: ACTIVE | Noted: 2020-07-07

## 2020-07-07 PROBLEM — K11.8 PAROTID GLAND FULLNESS: Status: ACTIVE | Noted: 2020-07-07

## 2020-07-07 PROBLEM — R93.7 ABNORMAL MAGNETIC RESONANCE IMAGING OF THORACIC SPINE: Status: ACTIVE | Noted: 2020-07-07

## 2020-07-07 PROBLEM — R91.8 LUNG MASS: Status: ACTIVE | Noted: 2020-07-07

## 2020-07-07 PROBLEM — G47.33 OSA (OBSTRUCTIVE SLEEP APNEA): Status: ACTIVE | Noted: 2020-07-07

## 2020-07-07 PROBLEM — J44.9 MODERATE COPD (CHRONIC OBSTRUCTIVE PULMONARY DISEASE) (HCC): Status: ACTIVE | Noted: 2018-10-02

## 2020-07-07 PROBLEM — Z72.0 TOBACCO USE: Status: ACTIVE | Noted: 2020-07-07

## 2020-07-07 PROBLEM — J32.9 SINUSITIS: Status: ACTIVE | Noted: 2020-07-07

## 2020-07-07 PROBLEM — R60.0 BILATERAL LOWER EXTREMITY EDEMA: Status: ACTIVE | Noted: 2020-07-07

## 2020-07-07 PROCEDURE — 99205 OFFICE O/P NEW HI 60 MIN: CPT | Performed by: OTOLARYNGOLOGY

## 2020-07-07 NOTE — PROGRESS NOTES
[address] [facility]  [facility] [address] [place] [address]  Dept: [phone]  Dept Fax: [phone]  Loc: [phone]    Saeed Mcmahan is a 81 y.o. female who was referred by No ref. provider found for:  Chief Complaint   Patient presents with    New Patient     Patient is here for parotitis. Patient states having a blocked gland on RT parotoid. Was on amoxicillin for her jaw and had reaction of hives all over. HPI:     Saeed Mcmahan is a 81 y.o. female by nurse practitioner Rubina for evaluation and treatment of recurring right parotid swelling and pain. The patient describes having had 2 distinct episodes of severe swelling and pain within the last couple of months both of which lasted over 24 hours and were incapacitating to the patient. After the second one she was seen in our ER for evaluation and work-up. She describes the pain is extending over her posterior cheek up to nearly the level of her eye and inferiorly and posteriorly down the angle of the mandible to the upper neck. She states that it hurts to her ear but not into her ear. She does describe having had occasional pain in this gland area that would occur once or twice per week and then not for several weeks for the better part of the last year. Prior to this it has never been worked up and she has had no referrals for evaluations of this type. She has treated it with ice packs with some relief of the pain and swelling but in both cases the problem lasted a full 24 hours. She has not associated eating any type of food like bitter or acidic foods with the onset of symptoms. She has no history of having had parotid gland surgery. She has no history of kidney or salivary gland stones but does complain of having a dry mouth and and dry eyes. She does have  a history of cholelithiasis and is status post cholecystectomy approximately 2 years ago.   She is on no blood thinners and has no personal or family history of bleeding disorders. She does have a history of loud snoring with intervals of apnea as an adult and is status post an all night polysomnogram that she was told was \"borderline\" but she states further that she may have slept between 2 and 3 hours the entire night, wondering if the study was valid. She has a lifelong pack-a-day smoker and states that she has tried to quit many times but always has gone back to smoking. She also describes herself as having COPD and associated shortness of breath. History: Allergies   Allergen Reactions    Amoxicillin Hives    Hydrochlorothiazide Itching    Adhesive Tape Rash and Other (See Comments)     Pulls skin off     Current Outpatient Medications   Medication Sig Dispense Refill    calcium carbonate (OSCAL) 500 MG TABS tablet Take 500 mg by mouth daily      albuterol (PROVENTIL) (2.5 MG/3ML) 0.083% nebulizer solution Take 3 mLs by nebulization every 6 hours as needed for Wheezing or Shortness of Breath 120 vial 11    PROAIR RESPICLICK 449 (90 Base) MCG/ACT aerosol powder inhalation Inhale 2 puffs into the lungs every 4 hours as needed for Wheezing or Shortness of Breath 1 Inhaler 3    Tiotropium Bromide-Olodaterol (STIOLTO RESPIMAT) 2.5-2.5 MCG/ACT AERS Inhale 2 puffs into the lungs daily 3 Inhaler 3    potassium chloride (MICRO-K) 10 MEQ extended release capsule Take 10 mEq by mouth daily      Furosemide (LASIX PO) Take by mouth      clonazePAM (KLONOPIN) 0.25 MG disintegrating tablet Take 0.25 mg by mouth 2 times daily as needed.       lamoTRIgine (LAMICTAL) 100 MG tablet Take 100 mg by mouth daily      Cholecalciferol (VITAMIN D PO) Take by mouth daily      vitamin C (ASCORBIC ACID) 500 MG tablet Take 500 mg by mouth daily      albuterol (PROVENTIL) (2.5 MG/3ML) 0.083% nebulizer solution Take 2.5 mg by nebulization every 6 hours as needed for Wheezing      Pyridoxine HCl (VITAMIN B-6) 100 MG tablet Take 100 mg by mouth daily.  aspirin 81 MG EC tablet Take 81 mg by mouth daily.  folic acid (FOLVITE) 1 MG tablet Take 1 mg by mouth daily.  omeprazole (PRILOSEC) 20 MG capsule Take 40 mg by mouth Daily        No current facility-administered medications for this visit. Past Medical History:   Diagnosis Date    Acid reflux disease     COPD (chronic obstructive pulmonary disease) ([facility] [address] [facility]     Depression       Past Surgical History:   Procedure Laterality Date    APPENDECTOMY      COLONOSCOPY      CYST REMOVAL      multiple    HYSTERECTOMY      CT LAP,CHOLECYSTECTOMY N/A 1/29/2018    CHOLECYSTECTOMY LAPAROSCOPIC performed by Jean-Pierre Schneider DO at [facility] ENDOSCOPY       Family History   Problem Relation Age of Onset    Heart Disease Mother     Memory Loss Mother     COPD Mother     Heart Disease Father     Memory Loss Father     COPD Father     Diabetes Maternal Grandmother     Cancer Other      Social History     Tobacco Use    Smoking status: Current Every Day Smoker     Packs/day: 1.00     Years: 30.00     Pack years: 30.00     Types: Cigarettes    Smokeless tobacco: Never Used   Substance Use Topics    Alcohol use: No        Subjective:      Review of Systems  Rest of review of systems are negative, except as noted in HPI. Objective:     /72 (Site: Left Upper Arm, Position: Sitting)   Pulse 66   Temp 96.3 °F (35.7 °C)   Resp 14   Wt 174 lb (78.9 kg)   BMI 28.96 kg/m²     Physical Exam     On general physical exam the patient is a pleasant alert well oriented in cooperative older adult female who appears approximately her stated age. Her voice is abnormal and that its moderately low in pitch and mildly raspy for her age and gender. Her speech pattern is within normal limits for her age and gender. She is breathing without labor.   Her ears were abnormal for the presence of moderate cerumen accumulation but were otherwise normal with respect to her middle ear structures her tympanic membrane and her pinna. Her cranial nerves II through XII were grossly and symmetrically intact. Her nose was patent bilaterally with tobacco staining of her vibrissae. Her oral cavity exam was abnormal for the presence of a narrow oropharyngeal inlet and edentulous maxilla and mandibles. She had a narrow oropharyngeal inlet and a class II-III Mallampati aperture. Her face was within normal limits with respect to the prominence of both parotid glands; both areas were symmetrical.  On palpation I detected no stones and detected no areas of tenderness the patient did describe the right side is feeling odd or different than the left side. I visualized her parotid duct while I expressed the saliva from the right parotid without any flow being noticed. I did the same thing on the left side and also found no flow. The patient's neck exam was free of adenopathy and thyromegaly. She had no bruits over her carotid bulbs. Her lungs had distant breath sounds that were overall vesicular. She had no CVA tenderness. Her heart tones were also distant but I heard a regular rate and rhythm without murmur gallop. Her abdomen had normal bowel sounds and was soft and nontender. Her upper extremities were abnormal for the presence of advanced clubbing of her nailbeds bilaterally. Her pulses were 2+ and symmetrical.  Her strength was within normal limits. Her lower extremities were abnormal for the presence of 2-3+ pitting edema up to the level of her mid calf. Vitals reviewed. Mri Abdomen W Wo Contrast    Result Date: 6/15/2020  Heterogeneous signal throughout the liver, particularly centrally without associated enhancement raises possibility of geographic fatty infiltration. Short-term follow-up recommended. Possible stone within the distal common bile duct. No ductal dilatation.  **This report has been created using voice recognition software. It may contain minor errors which are inherent in voice recognition technology. ** Final report electronically signed by Dr. Destiny Elizabeth on 6/15/2020 3:25 PM     Lab Results   Component Value Date    CREATININE 0.9 06/03/2020       All of the past medical history, past surgical history, family history,social history, allergies and current medications were reviewed with the patient. Assessment & Plan   Diagnoses and all orders for this visit:     Diagnosis Orders   1. JOSÉ MIGUEL (obstructive sleep apnea)  [facility] [place]   2. Parotid gland fullness  CT SOFT TISSUE NECK W CONTRAST       Based on her history and these physical findings, I think the patient's parotiditis may be obstructive but it may also be autoimmune. The fact that neither side produced detectable saliva is significant. I will begin by getting CT scan images of the neck to the skull base to look for any signs of parotid duct stones although there relatively rare. Neoplasm is low in my differential diagnosis. If I see no such findings I will send off serologies for autoimmune disease such as Sjogren's disease. I am significantly more concerned about her finger nail clubbing and her lower extremity edema in the context of untreated probably severe obstructive sleep apnea. As such I recommended that we order a repeat sleep study with hopes that he can be conducted at home so that she is more likely to sleep in her own bed with her own pillow. If she qualifies for CPAP I will recommend she try it. She may be a good candidate for a uvulopalatopharyngoplasty if she does not tolerate it. I also spoke with her at length about smoking cessation even though she is \"a lifelong smoker\". I recommended that she adopt the 12-Step approach of \"1 day at a time\" in her efforts to stay off of her addiction. I answered all of her questions and addressed her concerns.   She reported being pleased with the outcome of the visit and being willing to proceed as such. I will see her back in approximately 4 weeks. She is to postpone her return visit if she has not yet had her sleep study. Plan to do a nasopharyngoscopy on that return visit to look for the collapsing points of her nasopharynx and hypopharynx. Return in about 4 weeks (around 8/4/2020) for nasopharyngoscopy and plan treatment. **This report has been created using voice recognition software. It may contain minor errors which are inherent in voice recognition technology. **             July 15, 2020    I have reviewed the patient's neck CT with contrast and found it to be abnormal for the presence of multiple cystic spaces within the gland substance of both parotids. I saw no signs of malignancy and detected no calcium stones in either side. Based on this exam I favor an autoimmune process within the glands that may now be producing small areas of obstruction from sloughing tissue within the duct structure. When I see her again I will review these findings and recommend serological evaluation looking for autoimmune correlates.   pfc

## 2020-07-14 ENCOUNTER — TELEPHONE (OUTPATIENT)
Dept: ENT CLINIC | Age: 70
End: 2020-07-14

## 2020-07-15 ENCOUNTER — HOSPITAL ENCOUNTER (OUTPATIENT)
Dept: CT IMAGING | Age: 70
Discharge: HOME | End: 2020-07-15
Payer: MEDICARE

## 2020-07-15 ENCOUNTER — HOSPITAL ENCOUNTER (OUTPATIENT)
Age: 70
Discharge: HOME | End: 2020-07-15
Payer: MEDICARE

## 2020-07-15 DIAGNOSIS — K11.8 PAROTID GLAND FULLNESS: ICD-10-CM

## 2020-07-15 LAB
CREATININE, WHOLE BLOOD: 0.9 MG/DL (ref 0.5–1.2)
ESTIMATED GFR, PCACC: 66 ML/MIN/1.73M2

## 2020-07-15 PROCEDURE — 70491 CT SOFT TISSUE NECK W/DYE: CPT

## 2020-07-15 PROCEDURE — 6360000004 HC RX CONTRAST MEDICATION: Performed by: OTOLARYNGOLOGY

## 2020-07-15 PROCEDURE — 82565 ASSAY OF CREATININE: CPT

## 2020-07-15 RX ADMIN — IOPAMIDOL 100 ML: 755 INJECTION, SOLUTION INTRAVENOUS at 13:04

## 2020-07-18 NOTE — PROGRESS NOTES
[facility] and [address] [facility] follow up note note    Lu Walters                                              Chief Complaint: Bebeto Workman is a referred back by Dr Price Mchugh for angelia     Chief complaint: Lu Walters is a 81 y. o.oldfemale came for re evaluation regarding her ?sleep apnea  with referral from Dr. Niharika Jones MD and to consider Home/Portable sleep study      Sun'aq:    Sleep/Wake schedule:  Usual time to go to bed during the work/regular day of week: 10:30 PM to 11:00PM  Usual time to wake up during the work//regular day of week: ~5:00 AM.  Over the weekends her sleep schedule: [x] Remain same. She usually falls a sleep in less than: 10 minutes. She takes naps: Yes- in the morning  Number of naps per week:  7 times. During each nap she spends a total of: 15 to 20 minutes  The naps were reported as refreshing: No.     Sleep Hygiene:    Is the temperature and evironment in her bed room is acceptable to her: Yes. She watches Television in her bed room: No.  She read books, study, pay bills etc in the bed: Yes. Frequency She wake up during night/sleep: 1 to 2  Majority of nocturnal awakenings are for urination: Yes. Difficulty in falling back to sleep after nocturnal awakenings: Yes- some times    Do you drink coffee: Yes. 1 to 2 cup/s per day. Do you drink caffeinated beverages i.e sodas: Yes. 1 to 1.5 can/s per day. Coke    Do you drink tea: No  Do you drink alcoholic beverages: No.  History of recreational drug use: No.     History of tobacco smoking:Yes  Amount of tobacco smokin.5 to 2 PPD. Years of tobacco smokin.                                    Quit smoking: Yes. Quit year:2018   Current smoker: No.       History of recreational or IV drug use in the past:No    Sleep apnea symptoms:  Noticed to have loud snoring:Yes.  Noted by her family member- Justine Forrest daughter  Witnessed apneas during sleep goes to her work at:  2:00PM.   She completes her work at:  10:00PM.                        Past Medical History:   Diagnosis Date    Acid reflux disease     COPD (chronic obstructive pulmonary disease) ([address]     Depression        Past Surgical History:   Procedure Laterality Date    APPENDECTOMY      COLONOSCOPY      CYST REMOVAL      multiple    HYSTERECTOMY      CT LAP,CHOLECYSTECTOMY N/A 1/29/2018    CHOLECYSTECTOMY LAPAROSCOPIC performed [facility] Meg [facility]ina DO at [facility]      UPPER GASTROINTESTINAL ENDOSCOPY         Allergies   Allergen Reactions    Amoxicillin Hives    Hydrochlorothiazide Itching    Adhesive Tape Rash and Other (See Comments)     Pulls skin off       Current Outpatient Medications   Medication Sig Dispense Refill    traZODone (DESYREL) 150 MG tablet Take 150 mg by mouth nightly      calcium carbonate (OSCAL) 500 MG TABS tablet Take 500 mg by mouth daily      albuterol (PROVENTIL) (2.5 MG/3ML) 0.083% nebulizer solution Take 3 mLs by nebulization every 6 hours as needed for Wheezing or Shortness of Breath 120 vial 11    PROAIR RESPICLICK 508 (90 Base) MCG/ACT aerosol powder inhalation Inhale 2 puffs into the lungs every 4 hours as needed for Wheezing or Shortness of Breath 1 Inhaler 3    Tiotropium Bromide-Olodaterol (STIOLTO RESPIMAT) 2.5-2.5 MCG/ACT AERS Inhale 2 puffs into the lungs daily 3 Inhaler 3    potassium chloride (MICRO-K) 10 MEQ extended release capsule Take 10 mEq by mouth daily      Furosemide (LASIX PO) Take by mouth      clonazePAM (KLONOPIN) 0.25 MG disintegrating tablet Take 0.25 mg by mouth 2 times daily as needed.       lamoTRIgine (LAMICTAL) 100 MG tablet Take 100 mg by mouth daily      Cholecalciferol (VITAMIN D PO) Take by mouth daily      vitamin C (ASCORBIC ACID) 500 MG tablet Take 500 mg by mouth daily      albuterol (PROVENTIL) (2.5 MG/3ML) 0.083% nebulizer solution Take 2.5 mg by nebulization every 6 hours as needed for Wheezing      Pyridoxine HCl (VITAMIN B-6) 100 MG tablet Take 100 mg by mouth daily.  aspirin 81 MG EC tablet Take 81 mg by mouth daily.  folic acid (FOLVITE) 1 MG tablet Take 1 mg by mouth daily.  omeprazole (PRILOSEC) 20 MG capsule Take 40 mg by mouth Daily        No current facility-administered medications for this visit. Family History   Problem Relation Age of Onset    Heart Disease Mother     Memory Loss Mother     COPD Mother     Heart Disease Father     Memory Loss Father     COPD Father     Diabetes Maternal Grandmother     Cancer Other         Review of Systems:    General/Constitutional: She gained 9lbs of weight from her last sleep study in 2018 with normal appetite. No fever or chills. HENT: Negative. Eyes: Negative. Upper respiratory tract: No nasal stuffiness or post nasal drip. Lower respiratory tract/ lungs: Occasional cough with no sputum production. No hemoptysis. Cardiovascular: No palpitations or chest pain. Gastrointestinal: No nausea or vomiting. Neurological: No focal neurologiacal weakness. Extremities: No edema. Musculoskeletal: No complaints. Genitourinary: No complaints. Hematological: Negative. Psychiatric/Behavioral: Negative. Skin: No itching. /62 (Site: Left Upper Arm, Position: Sitting, Cuff Size: Medium Adult)   Pulse 90   Ht 5' 5\" (1.651 m)   Wt 173 lb (78.5 kg)   SpO2 96% Comment: room air at rest  BMI 28.79 kg/m²   Mallampati airway Class: 3  Neck Circumference:.14 Inches  [place] sleepiness score 7/22/20: 5  ( On further questioning she gives a history of hypersomnia ( Excessive daytime sleepiness) with daytime sleepy attacks. No reported motor vehicle accidents due to her sleepiness). Saqli. 78    Physical Exam   Nursing note and vitals reviewed. Constitutional: Patient appears moderately built and moderately nourished. No distress. Patient is oriented to person, place, and time.   HENT:   Head: Normocephalic and atraumatic. Right Ear: External ear normal.   Left Ear: External ear normal.   Mouth/Throat: Oropharynx is clear and moist.  No oral thrush. Eyes: Conjunctivae are normal. Pupils are equal, round, and reactive to light. No scleral icterus. Neck: Neck supple. No JVD present. No tracheal deviation present. Cardiovascular: Normal rate, regular rhythm, normal heart sounds. No murmur heard. Pulmonary/Chest: Effort normal and breath sounds normal. No stridor. No respiratory distress. No wheezes. No rales. Patient exhibits no tenderness. Abdominal: Soft. Patient exhibits no distension. No tenderness. Musculoskeletal: Normal range of motion. Extremities: Patient exhibits no edema and no tenderness. Lymphadenopathy:  No cervical adenopathy. Neurological: Patient is alert and oriented to person, place, and time. Skin: Skin is warm and dry. Patient is not diaphoretic. Psychiatric: Patient  has a normal mood and affect. Patient behavior is normal.     Diagnostic Data:    Sleep test:       SLEEP STUDY REPORT     PATIENT NAME: Chalino Pierson                   :        1950  MED REC NO:   [id number]                           ROOM:  ACCOUNT NO:   [de-identified]                           ADMIT DATE: 2018  PROVIDER:     Riaz Rico. MD aRma     DATE OF STUDY:  2018     REFERRING PROVIDER:  Dr. Velia Doll. SLEEP STAGING AND DISTRIBUTION SUMMARY:  Revealed the patient spent 23.5  minutes in stage I consisting of 6.7%, 129 minutes in stage II consisting  of 36.7%, 103.5 minutes in stage III consisting of 29.4%, and 95.5 minutes  in REM sleep consisting of 27.2% of total sleep time. IMPRESSION:  1. Soft to moderate snoring with no clinically significant obstructive  sleep apnea. 2.  The patient noticed to have worsening of respiratory events in supine  position. 3.  Periodic limb movements with no significant arousals. 4.  Nocturnal hypoxia.   The patient spent a total of 6.4

## 2020-07-22 ENCOUNTER — INITIAL CONSULT (OUTPATIENT)
Dept: PULMONOLOGY | Age: 70
End: 2020-07-22
Payer: MEDICARE

## 2020-07-22 VITALS
BODY MASS INDEX: 28.82 KG/M2 | WEIGHT: 173 LBS | HEIGHT: 65 IN | SYSTOLIC BLOOD PRESSURE: 118 MMHG | DIASTOLIC BLOOD PRESSURE: 62 MMHG | OXYGEN SATURATION: 96 % | HEART RATE: 90 BPM

## 2020-07-22 PROCEDURE — 99214 OFFICE O/P EST MOD 30 MIN: CPT | Performed by: INTERNAL MEDICINE

## 2020-07-22 RX ORDER — TRAZODONE HYDROCHLORIDE 150 MG/1
25 TABLET ORAL NIGHTLY
COMMUNITY

## 2020-07-22 NOTE — PROGRESS NOTES
Chief Complaint: Shannen November is a referred back by Dr Audie Rowland for angelia    Mallampati airway Class: 3  Neck Circumference:.14 Inches    [place] sleepiness score 7/22/20: 5  Saqli. 78

## 2020-07-22 NOTE — PATIENT INSTRUCTIONS
Patient Education        Stopping Smoking: Care Instructions  Your Care Instructions     Cigarette smokers crave the nicotine in cigarettes. Giving it up is much harder than simply changing a habit. Your body has to stop craving the nicotine. It is hard to quit, but you can do it. There are many tools that people use to quit smoking. You may find that combining tools works best for you. There are several steps to quitting. First you get ready to quit. Then you get support to help you. After that, you learn new skills and behaviors to become a nonsmoker. For many people, a necessary step is getting and using medicine. Your doctor will help you set up the plan that best meets your needs. You may want to attend a smoking cessation program to help you quit smoking. When you choose a program, look for one that has proven success. Ask your doctor for ideas. You will greatly increase your chances of success if you take medicine as well as get counseling or join a cessation program.  Some of the changes you feel when you first quit tobacco are uncomfortable. Your body will miss the nicotine at first, and you may feel short-tempered and grumpy. You may have trouble sleeping or concentrating. Medicine can help you deal with these symptoms. You may struggle with changing your smoking habits and rituals. The last step is the tricky one: Be prepared for the smoking urge to continue for a time. This is a lot to deal with, but keep at it. You will feel better. Follow-up care is a key part of your treatment and safety. Be sure to make and go to all appointments, and call your doctor if you are having problems. It's also a good idea to know your test results and keep a list of the medicines you take. How can you care for yourself at home? · Ask your family, friends, and coworkers for support. You have a better chance of quitting if you have help and support.   · Join a support group, such as Nicotine Anonymous, for people who are trying to quit smoking. · Consider signing up for a smoking cessation program, such as the American Lung Association's Freedom from Smoking program.  · Get text messaging support. Go to the website at www.smokefree. gov to sign up for the [facility] program.  · Set a quit date. Pick your date carefully so that it is not right in the middle of a big deadline or stressful time. Once you quit, do not even take a puff. Get rid of all ashtrays and lighters after your last cigarette. Clean your house and your clothes so that they do not smell of smoke. · Learn how to be a nonsmoker. Think about ways you can avoid those things that make you reach for a cigarette. ? Avoid situations that put you at greatest risk for smoking. For some people, it is hard to have a drink with friends without smoking. For others, they might skip a coffee break with coworkers who smoke. ? Change your daily routine. Take a different route to work or eat a meal in a different place. · Cut down on stress. Calm yourself or release tension by doing an activity you enjoy, such as reading a book, taking a hot bath, or gardening. · Talk to your doctor or pharmacist about nicotine replacement therapy, which replaces the nicotine in your body. You still get nicotine but you do not use tobacco. Nicotine replacement products help you slowly reduce the amount of nicotine you need. These products come in several forms, many of them available over-the-counter:  ? Nicotine patches  ? Nicotine gum and lozenges  ? Nicotine inhaler  · Ask your doctor about bupropion (Wellbutrin) or varenicline (Chantix), which are prescription medicines. They do not contain nicotine. They help you by reducing withdrawal symptoms, such as stress and anxiety. · Some people find hypnosis, acupuncture, and massage helpful for ending the smoking habit. · Eat a healthy diet and get regular exercise.  Having healthy habits will help your body move past its craving for Jos Session was advised to keep her scheduled follow up at [facility] for pulmonary disease) with Ms. Jane Rodriguez,CNP Pulmonary clinic for further evaluation and management of Severe COPD. -Evon Renae advised to not to drive any motor vehicles or operate heavy equipment until her sleep symptoms are under good control. Evon Renae verbalizes understanding.  - Patient educated about my impression and plan. Patient verbalizes understanding.

## 2020-07-29 NOTE — PROGRESS NOTES
[place] for [facility], Sleep and [address] [facility] Follow up note      Gato Wharton is here for a follow up after HST done on 8/18/20                                      Chief Complaint and [place]: Mackenzie Hamilton is a 81 y. o.oldfemale came for follow up regarding her home/portable sleep study results. She underwent home/portable sleep study on 08/18/2020. She denies any excessive day time sleepiness. She denied any prior history of COPD, CHF, Obesity hypoventilation, Prior palate surgery and [place] sleep apnea. Review of Systems:   General/Constitutional: she lost 3lbs of weight from the last visit with normal appetite. No fever or chills. HENT: Negative. Eyes: Negative. Upper respiratory tract: No nasal stuffiness or post nasal drip. Lower respiratory tract/ lungs: No cough or sputum production. No hemoptysis. Cardiovascular: No palpitations or chest pain. Gastrointestinal: No nausea or vomiting. Neurological: No focal neurologiacal weakness. Extremities: No edema. Musculoskeletal: No complaints. Genitourinary: No complaints. Hematological: Negative. Psychiatric/Behavioral: Negative. Skin: No itching.         Past Medical History:   Diagnosis Date    Acid reflux disease     COPD (chronic obstructive pulmonary disease) [address] [address]     Depression        Past Surgical History:   Procedure Laterality Date    APPENDECTOMY      COLONOSCOPY      CYST REMOVAL      multiple    HYSTERECTOMY      [place] LAP,CHOLECYSTECTOMY N/A 1/29/2018    CHOLECYSTECTOMY LAPAROSCOPIC performed by Yousif Shahid DO at [facility] ENDOSCOPY         Social History     Tobacco Use    Smoking status: Current Every Day Smoker     Packs/day: 1.00     Years: 30.00     Pack years: 30.00     Types: Cigarettes     Start date: 7/22/1975    Smokeless tobacco: Never Used   Substance Use Topics    Alcohol use: No    Drug use: No       Allergies   Allergen Reactions    Amoxicillin Hives    Hydrochlorothiazide Itching    Adhesive Tape Rash and Other (See Comments)     Pulls skin off       Current Outpatient Medications   Medication Sig Dispense Refill    VENTOLIN  (90 Base) MCG/ACT inhaler USE 2 INHALATIONS EVERY 6 HOURS AS NEEDED FOR WHEEZING OR SHORTNESS OF BREATH 54 g 2    VENTOLIN  (90 Base) MCG/ACT inhaler Inhale 2 puffs into the lungs every 6 hours as needed for Wheezing or Shortness of Breath 3 Inhaler 3    traZODone (DESYREL) 150 MG tablet Take 150 mg by mouth nightly      calcium carbonate (OSCAL) 500 MG TABS tablet Take 500 mg by mouth daily      albuterol (PROVENTIL) (2.5 MG/3ML) 0.083% nebulizer solution Take 3 mLs by nebulization every 6 hours as needed for Wheezing or Shortness of Breath 120 vial 11    Tiotropium Bromide-Olodaterol (STIOLTO RESPIMAT) 2.5-2.5 MCG/ACT AERS Inhale 2 puffs into the lungs daily 3 Inhaler 3    potassium chloride (MICRO-K) 10 MEQ extended release capsule Take 10 mEq by mouth daily      Furosemide (LASIX PO) Take by mouth      clonazePAM (KLONOPIN) 0.25 MG disintegrating tablet Take 0.25 mg by mouth 2 times daily as needed.  lamoTRIgine (LAMICTAL) 100 MG tablet Take 100 mg by mouth daily      Cholecalciferol (VITAMIN D PO) Take by mouth daily      vitamin C (ASCORBIC ACID) 500 MG tablet Take 500 mg by mouth daily      albuterol (PROVENTIL) (2.5 MG/3ML) 0.083% nebulizer solution Take 2.5 mg by nebulization every 6 hours as needed for Wheezing      Pyridoxine HCl (VITAMIN B-6) 100 MG tablet Take 100 mg by mouth daily.  aspirin 81 MG EC tablet Take 81 mg by mouth daily.  folic acid (FOLVITE) 1 MG tablet Take 1 mg by mouth daily.  omeprazole (PRILOSEC) 20 MG capsule Take 40 mg by mouth Daily        No current facility-administered medications for this visit.         Family History   Problem Relation Age of Onset    Heart Disease Mother    [place] Memory Loss Mother     COPD Mother     Heart Disease Father     Memory Loss Father     COPD Father     Diabetes Maternal Grandmother     Cancer Other           /68 (Site: Left Upper Arm, Position: Sitting, Cuff Size: Large Adult)   Pulse 93   Temp 98.3 °F (36.8 °C) (Tympanic)   Ht 5' 5\" (1.651 m)   Wt 170 lb (77.1 kg)   SpO2 97% Comment: Room air  BMI 28.29 kg/m²     BMI:  Body mass index is 28.29 kg/m². Mallampati airway Class: 3  Neck Circumference 14  Inches  [place] sleepiness score 20: 2  SAQIL 27    Physical Exam :  Constitutional: Patient appears moderately built and moderately nourished. No distress. Patient is oriented to person, place, and time. HENT:   Head: Normocephalic and atraumatic. Right Ear: External ear normal.   Left Ear: External ear normal.   Mouth/Throat: Oropharynx is clear and moist.   Eyes: Conjunctivae are normal. Pupils are equal and reactive to light. No scleral icterus. Neck: Neck supple. No JVD present. Cardiovascular: Normal rate, regular rhythm, normal heart sounds. No murmur heard. Pulmonary/Chest: Effort normal and breath sounds normal. No stridor. No respiratory distress. No wheezes. No rales. Abdominal: Soft. Patient exhibits no distension. No tenderness. Musculoskeletal: Normal range of motion. Extremities: Patient exhibits no erythema or no edema. Lymphadenopathy:  No cervical adenopathy. Neurological: Patient is alert and oriented to person, place, and time. Skin: Skin is warm and dry. Patient is not diaphoretic. Psychiatric: Patient  has a normal mood and affect. Diagnostic Data:    Home/Portable sleep study done on :  SLEEP STUDY REPORT     PATIENT NAME: Luis Puri                   :        1950  MED REC NO:   [id number]                           ROOM:  ACCOUNT NO:   [de-identified]                           ADMIT DATE: 2020  PROVIDER:     Avani Doty.  MD Rmaa  OXYGEN SATURATION MONITORING: Revealed the patient had a maximum oxygen  desaturation to 72% with a mean oxygen saturation of 91.6%. The patient  spent a total of 2.6 minutes below oxygen saturation less than 88%. RESPIRATORY EVENT ANALYSIS:  Revealed the patient had a total of 2  apneas, the 2 were obstructive in nature. The patient also had a total  of 22 hypopneas, all of them were obstructive in nature. The total  number of apneas and hypopneas recorded during the study were 24 with  the apnea-hypopnea index of 4.2. IMPRESSION:  1. No clinically significant obstructive sleep apnea. 2.  Hypersomnia of uncertain etiology. 3.  Parotid gland fullness, currently following Dr. Dao Browne. 4.  Severe COPD. 5.  Depression. 6.  History of nocturnal hypoxia; however, the patient spent only 2.6  minutes below oxygen saturation less than 88% during this portable sleep  study. Assesment:  -No clinically significant obstructive sleep apnea. -Depression, currently on treatment.  -Hypersomnia of uncertain etiology. Most likely due to her current medications with sedation as side effect including Lamictal and Trazodone. She takes Clonazepam on PRN basis  -Parotid gland fullness, currently following Dr. Dao Browne.  -Severe COPD- follows with [facility].   -Depression on treatment. She is following with her psychiatrist Dr. Albert Woodward at [facility].  -History of nocturnal hypoxia; however, the patient spent only 2.6  minutes below oxygen saturation less than 88% during this portable sleep study. Recommendations/Plan:  -She was advised to discuss with her psychiatrist and family physician regarding changing  her current medication I.e Lamictal, Trazodone and Klonapin to less sedative medication regimen to improve her excessive daytime sleepiness.  -She was advised to continue to practice good sleep hygiene practices.   -Amakayleenjosé MunozMoran was advised to keep her scheduled follow up at [facility] for pulmonary disease) with Ms. Alea Jaceasant Pulmonary clinic for further evaluation and management of Severe COPD. -She was advised to loose weight by controlling diet and doing exercise once cleared by her family physician.   -Schedule patient for follow up with my clinic on PRN/As needed basis for sleep related issues. Patient to follow with his family physician closely. -Gabriel Fernandez was educated about my impression and plan. She verbalizes understanding.

## 2020-08-06 PROBLEM — J32.9 SINUSITIS: Status: RESOLVED | Noted: 2020-07-07 | Resolved: 2020-08-06

## 2020-08-11 ENCOUNTER — TELEPHONE (OUTPATIENT)
Dept: PULMONOLOGY | Age: 70
End: 2020-08-11

## 2020-08-11 NOTE — TELEPHONE ENCOUNTER
Patient is currently using the proair respiclick and ventolin. Patient [facility] she is able to get it discounted to have three sent at once. She does not like the Proair. Thanks.

## 2020-08-18 ENCOUNTER — HOSPITAL ENCOUNTER (OUTPATIENT)
Dept: SLEEP CENTER | Age: 70
Discharge: HOME | End: 2020-08-20
Payer: MEDICARE

## 2020-08-18 PROCEDURE — 95806 SLEEP STUDY UNATT&RESP EFFT: CPT

## 2020-08-18 NOTE — PROGRESS NOTES
Lisa Monk presents today for a HST instruction and demonstration on unit # 2242. Questions were asked and answers given. She was able to return demonstration and verbalized understanding. The sleep center control room phone number was provided incase questions arise during her study. Informed patient to call 911 in case of an emergency. She states she will return the unit tomorrow.

## 2020-08-19 LAB — STATUS: NORMAL

## 2020-08-20 NOTE — PROGRESS NOTES
[address] [place] [address]                               SLEEP STUDY REPORT    PATIENT NAME: Shima Maurice                   :        1950  MED REC NO:   [id number]                           ROOM:  ACCOUNT NO:   [de-identified]                           ADMIT DATE: 2020  PROVIDER:     Maine Palomo. MD Rama    DATE OF STUDY:  2020    PORTABLE/HOME SLEEP STUDY    REFERRING PROVIDER:  Mao Chris MD, ENT Surgeon. The patient's height is 65 inches, weight is 173 pounds with a BMI of  28.8. HISTORY:  The patient is a 28-year-old female, who was initially  evaluated by me on 2020. The patient currently suffering with  hypersomnia. The patient also gave a history of snoring without  witnessed apneas. The patient having frequent nocturnal awakenings. The patient is scheduled for home/portable sleep study as requested by  the patient to evaluate for sleep apnea as an etiology for hypersomnia. The patient had associated comorbidities including severe COPD,  depression, and hypersomnia. METHODS:  The patient underwent Type III Portable Monitoring Sleep Study  including the simultaneous recording of oral-nasal airflow, rib and  abdominal respiratory effort, pulse rate, oxygen saturation, left and  right leg movement, and body position. Scoring criteria is consistent  with the current published AASM standards for scoring of apneas and  hypopneas 4A. Sleep staging and scoring followed the standard put forth  by the American Academy of Sleep Medicine and utilized the 4A  obstructive hypopnea event desaturation of 4 percent or greater. INTERPRETATION:  This is a portable/home sleep study. The study was  performed on 2020. The study was started at 11:33 p.m. and was  terminated at 05:18 a.m. with a total recording time was 344.8 minutes.     RESPIRATORY EVENT ANALYSIS:  Revealed the patient had a total of 2  apneas, the 2 were obstructive in nature. The patient also had a total  of 22 hypopneas, all of them were obstructive in nature. The total  number of apneas and hypopneas recorded during the study were 24 with  the apnea-hypopnea index of 4.2. OXYGEN SATURATION MONITORING:  Revealed the patient had a maximum oxygen  desaturation to 72% with a mean oxygen saturation of 91.6%. The patient  spent a total of 2.6 minutes below oxygen saturation less than 88%. POSITION ANALYSIS:  Revealed the patient spent 39.3 minutes in supine  position, 305.5 minutes in non-supine position. EKG MONITORING:  Revealed normal sinus rhythm. IMPRESSION:  1. No clinically significant obstructive sleep apnea. 2.  Hypersomnia of uncertain etiology. 3.  Parotid gland fullness, currently following Dr. Enzo Deras. 4.  Severe COPD. 5.  Depression. 6.  History of nocturnal hypoxia; however, the patient spent only 2.6  minutes below oxygen saturation less than 88% during this portable sleep  study. RECOMMENDATIONS:  1. The patient should to be scheduled for a followup with my clinic as  soon as possible to discuss about the sleep study findings for further  management. 2.  If the patient's hypersomnia persists, the patient needs further  evaluation. Thanks to Enzo Deras MD, for giving me this opportunity to  participate in the care of this pleasant lady.         Kaylee Delgado MD    D: 08/19/2020 16:19:43       T: 08/20/2020 3:47:55     [email or web address]  Job#: [id number]     Doc#: [id number]    CC:

## 2020-08-26 ENCOUNTER — OFFICE VISIT (OUTPATIENT)
Dept: PULMONOLOGY | Age: 70
End: 2020-08-26
Payer: MEDICARE

## 2020-08-26 VITALS
WEIGHT: 170 LBS | SYSTOLIC BLOOD PRESSURE: 124 MMHG | HEIGHT: 65 IN | TEMPERATURE: 98.3 F | HEART RATE: 93 BPM | BODY MASS INDEX: 28.32 KG/M2 | DIASTOLIC BLOOD PRESSURE: 68 MMHG | OXYGEN SATURATION: 97 %

## 2020-08-26 PROCEDURE — 99213 OFFICE O/P EST LOW 20 MIN: CPT | Performed by: INTERNAL MEDICINE

## 2020-08-26 NOTE — PATIENT INSTRUCTIONS
Recommendations/Plan:  -She was advised to discuss with her psychiatrist and family physician regarding changing  her current medication I.e Lamictal, Trazodone and Klonapin to less sedative medication regimen to improve her excessive daytime sleepiness.  -She was advised to continue to practice good sleep hygiene practices. -Angelica Aranda was advised to keep her scheduled follow up at [facility] for pulmonary disease) with Ms. Jane Rodriguez CNP Pulmonary clinic for further evaluation and management of Severe COPD. -She was advised to loose weight by controlling diet and doing exercise once cleared by her family physician.   -Schedule patient for follow up with my clinic on PRN/As needed basis for sleep related issues. Patient to follow with his family physician closely. -Angelica Aranda was educated about my impression and plan. She verbalizes understanding.

## 2020-08-26 NOTE — PROGRESS NOTES
Chief Complaint:Cheyenne is here for a follow up after HST done on 8/18/20    Mallampati airway Class: 3  Neck Circumference 14  Inches    [place] sleepiness score 8/26/20: 2    SAQIL 27

## 2020-09-02 ENCOUNTER — OFFICE VISIT (OUTPATIENT)
Dept: ENT CLINIC | Age: 70
End: 2020-09-02
Payer: MEDICARE

## 2020-09-02 VITALS
TEMPERATURE: 98.1 F | HEART RATE: 68 BPM | DIASTOLIC BLOOD PRESSURE: 68 MMHG | BODY MASS INDEX: 28.22 KG/M2 | SYSTOLIC BLOOD PRESSURE: 114 MMHG | RESPIRATION RATE: 12 BRPM | WEIGHT: 169.6 LBS

## 2020-09-02 PROBLEM — G47.33 OSA (OBSTRUCTIVE SLEEP APNEA): Status: RESOLVED | Noted: 2020-07-07 | Resolved: 2020-09-02

## 2020-09-02 PROCEDURE — 99213 OFFICE O/P EST LOW 20 MIN: CPT | Performed by: OTOLARYNGOLOGY

## 2020-09-02 NOTE — PROGRESS NOTES
Pulse 68   Temp 98.1 °F (36.7 °C) (Infrared)   Resp 12   Wt 169 lb 9.6 oz (76.9 kg)   BMI 28.22 kg/m²     Physical Exam     No facial asymmetry visualized. No swelling or surface induration noted about the right parotid gland. Vitals reviewed. No results found. Lab Results   Component Value Date    CREATININE 0.9 07/15/2020    CREATININE 0.9 06/03/2020       All of the past medical history, past surgical history, family history,social history, allergies and current medications were reviewed with the patient. Assessment & Plan   Diagnoses and all orders for this visit:     Diagnosis Orders   1. Parotid gland fullness      Resolved       Based on the patient's history and these physical findings as well as the negative CT scan, the etiology of the patient's parotid swelling is likely a manifestation of salivary obstruction by stones that were too small to be seen on the CT scan. Therefore the patient continues to be cautious about draining the gland with sour candies or lemon drops, she is unlikely to have a recurrence of this problem. If she does she can follow-up with me again and we can determine if new measures need to be employed. This might include a marsupialization of her parotid duct or something related possibly in the context of an endoscopic evaluation of her duct. For the time being the patient can follow-up with me on an as-needed basis. I answered her questions and explained her condition to her to her satisfaction. She reported being pleased with the outcome of the visit and being willing to proceed as such. I spent approximately 15 minutes with the patient. Return if symptoms worsen or fail to improve with sialagogues. **This report has been created using voice recognition software. It may contain minor errors which are inherent in voice recognition technology. **

## 2020-10-21 ENCOUNTER — HOSPITAL ENCOUNTER (OUTPATIENT)
Dept: WOMENS IMAGING | Age: 70
Discharge: HOME | End: 2020-10-21
Payer: MEDICARE

## 2020-10-21 PROCEDURE — 77080 DXA BONE DENSITY AXIAL: CPT

## 2020-11-19 ENCOUNTER — HOSPITAL ENCOUNTER (OUTPATIENT)
Dept: MAMMOGRAPHY | Age: 70
Discharge: HOME | End: 2020-11-19
Payer: MEDICARE

## 2020-11-19 ENCOUNTER — HOSPITAL ENCOUNTER (OUTPATIENT)
Age: 70
Discharge: HOME | End: 2020-11-19
Payer: MEDICARE

## 2020-11-19 LAB
ALBUMIN SERPL-MCNC: 4 G/DL (ref 3.5–5.1)
ALP BLD-CCNC: 101 U/L (ref 38–126)
ALT SERPL-CCNC: 25 U/L (ref 11–66)
ANION GAP SERPL CALCULATED.3IONS-SCNC: 11 MEQ/L (ref 8–16)
AST SERPL-CCNC: 18 U/L (ref 5–40)
BASOPHILS # BLD: 0.5 %
BASOPHILS ABSOLUTE: 0 THOU/MM3 (ref 0–0.1)
BILIRUB SERPL-MCNC: 0.6 MG/DL (ref 0.3–1.2)
BUN BLDV-MCNC: 15 MG/DL (ref 7–22)
CALCIUM SERPL-MCNC: 9.6 MG/DL (ref 8.5–10.5)
CHLORIDE BLD-SCNC: 107 MEQ/L (ref 98–111)
CHOLESTEROL, FASTING: 96 MG/DL (ref 100–199)
CO2: 25 MEQ/L (ref 23–33)
CREAT SERPL-MCNC: 0.6 MG/DL (ref 0.4–1.2)
EOSINOPHIL # BLD: 1.1 %
EOSINOPHILS ABSOLUTE: 0.1 THOU/MM3 (ref 0–0.4)
ERYTHROCYTE [DISTWIDTH] IN BLOOD BY AUTOMATED COUNT: 13.7 % (ref 11.5–14.5)
ERYTHROCYTE [DISTWIDTH] IN BLOOD BY AUTOMATED COUNT: 47.4 FL (ref 35–45)
GFR SERPL CREATININE-BSD FRML MDRD: > 90 ML/MIN/1.73M2
GLUCOSE FASTING: 85 MG/DL (ref 70–108)
HCT VFR BLD CALC: 45.2 % (ref 37–47)
HDLC SERPL-MCNC: 57 MG/DL
HEMOGLOBIN: 14.5 GM/DL (ref 12–16)
IMMATURE GRANS (ABS): 0.01 THOU/MM3 (ref 0–0.07)
IMMATURE GRANULOCYTES: 0.1 %
LDL CHOLESTEROL CALCULATED: 26 MG/DL
LYMPHOCYTES # BLD: 29.9 %
LYMPHOCYTES ABSOLUTE: 2.2 THOU/MM3 (ref 1–4.8)
MCH RBC QN AUTO: 30.3 PG (ref 26–33)
MCHC RBC AUTO-ENTMCNC: 32.1 GM/DL (ref 32.2–35.5)
MCV RBC AUTO: 94.4 FL (ref 81–99)
MONOCYTES # BLD: 8.4 %
MONOCYTES ABSOLUTE: 0.6 THOU/MM3 (ref 0.4–1.3)
NUCLEATED RED BLOOD CELLS: 0 /100 WBC
PLATELET # BLD: 267 THOU/MM3 (ref 130–400)
PMV BLD AUTO: 9.9 FL (ref 9.4–12.4)
POTASSIUM SERPL-SCNC: 4.2 MEQ/L (ref 3.5–5.2)
RBC # BLD: 4.79 MILL/MM3 (ref 4.2–5.4)
RHEUMATOID FACTOR: < 10 IU/ML (ref 0–13)
SEDIMENTATION RATE, ERYTHROCYTE: 6 MM/HR (ref 0–20)
SEG NEUTROPHILS: 60 %
SEGMENTED NEUTROPHILS ABSOLUTE COUNT: 4.4 THOU/MM3 (ref 1.8–7.7)
SODIUM BLD-SCNC: 143 MEQ/L (ref 135–145)
TOTAL PROTEIN: 6.4 G/DL (ref 6.1–8)
TRIGLYCERIDE, FASTING: 63 MG/DL (ref 0–199)
TSH SERPL DL<=0.05 MIU/L-ACNC: 2.02 UIU/ML (ref 0.4–4.2)
VITAMIN D 25-HYDROXY: 49 NG/ML (ref 30–100)
WBC # BLD: 7.3 THOU/MM3 (ref 4.8–10.8)

## 2020-11-19 PROCEDURE — 85025 COMPLETE CBC W/AUTO DIFF WBC: CPT

## 2020-11-19 PROCEDURE — 84443 ASSAY THYROID STIM HORMONE: CPT

## 2020-11-19 PROCEDURE — 80053 COMPREHEN METABOLIC PANEL: CPT

## 2020-11-19 PROCEDURE — 82306 VITAMIN D 25 HYDROXY: CPT

## 2020-11-19 PROCEDURE — 80061 LIPID PANEL: CPT

## 2020-11-19 PROCEDURE — 85651 RBC SED RATE NONAUTOMATED: CPT

## 2020-11-19 PROCEDURE — 86430 RHEUMATOID FACTOR TEST QUAL: CPT

## 2020-11-19 PROCEDURE — 36415 COLL VENOUS BLD VENIPUNCTURE: CPT

## 2020-11-19 PROCEDURE — 77063 BREAST TOMOSYNTHESIS BI: CPT

## 2021-01-13 ENCOUNTER — OFFICE VISIT (OUTPATIENT)
Dept: PULMONOLOGY | Age: 71
End: 2021-01-13
Payer: MEDICARE

## 2021-01-13 VITALS
OXYGEN SATURATION: 98 % | SYSTOLIC BLOOD PRESSURE: 130 MMHG | BODY MASS INDEX: 29.77 KG/M2 | HEART RATE: 85 BPM | WEIGHT: 168 LBS | DIASTOLIC BLOOD PRESSURE: 62 MMHG | TEMPERATURE: 98.6 F | HEIGHT: 63 IN

## 2021-01-13 DIAGNOSIS — F17.218 CIGARETTE NICOTINE DEPENDENCE WITH OTHER NICOTINE-INDUCED DISORDER: ICD-10-CM

## 2021-01-13 DIAGNOSIS — J44.9 STAGE 3 SEVERE COPD BY GOLD CLASSIFICATION (HCC): Primary | ICD-10-CM

## 2021-01-13 PROBLEM — Z87.891 PERSONAL HISTORY OF TOBACCO USE: Status: ACTIVE | Noted: 2020-07-07

## 2021-01-13 PROCEDURE — G0296 VISIT TO DETERM LDCT ELIG: HCPCS | Performed by: NURSE PRACTITIONER

## 2021-01-13 PROCEDURE — 99214 OFFICE O/P EST MOD 30 MIN: CPT | Performed by: NURSE PRACTITIONER

## 2021-01-13 ASSESSMENT — ENCOUNTER SYMPTOMS
SHORTNESS OF BREATH: 1
COUGH: 0
VOMITING: 0
EYES NEGATIVE: 1
NAUSEA: 0
WHEEZING: 1
DIARRHEA: 0
ABDOMINAL PAIN: 0

## 2021-01-13 NOTE — PROGRESS NOTES
[facility]     Patient: Priya Fritz, 78 y.o.   : 1950    Pt of Dr. Brady Huitron   Patient presents with    Follow-up     COPD 6 mo f/u        FANNY  Patricio Lorenzo is here for 6 month follow up for COPD  Had exacerbation on Monday this week, was able to make it through work , uses Ventolin several times at work used nebulizer after. No oral steroids, no ER visits  Still working full time as  at the [employer] COPD has been under good control, compared to 1 year ago. Not on home O2    DEXA scan completed 10/21/2020  Current Inhalers: Stiolto respimat / Ventolin HFA/ Albuterol nebs   Previous Inhalers: Proair HFA/ Proair respiclick- does not like ProAir, less effective for here   Last Flu shot: does not get, states gets ill from it.    Last Pneumonia Vaccine: unknown     Past Medical hx   PMH:  Past Medical History:   Diagnosis Date    Acid reflux disease     COPD (chronic obstructive pulmonary disease) ([facility] [address]     Depression      SURGICAL HISTORY:  Past Surgical History:   Procedure Laterality Date    APPENDECTOMY      COLONOSCOPY      CYST REMOVAL      multiple    HYSTERECTOMY      AL LAP,CHOLECYSTECTOMY N/A 2018    CHOLECYSTECTOMY LAPAROSCOPIC performed by [facility], DO at [address] [facility] ENDOSCOPY       SOCIAL HISTORY:  Social History     Tobacco Use    Smoking status: Current Every Day Smoker     Packs/day: 1.00     Years: 30.00     Pack years: 30.00     Types: Cigarettes     Start date: 1975    Smokeless tobacco: Never Used   Substance Use Topics    Alcohol use: No    Drug use: No     ALLERGIES:  Allergies   Allergen Reactions    Amoxicillin Hives    Hydrochlorothiazide Itching    Adhesive Tape Rash and Other (See Comments)     Pulls skin off     FAMILY HISTORY:  Family History   Problem Relation Age of Onset    Heart Disease Mother    Michell Medel Memory Loss Mother     COPD Mother     Heart Disease Father     Memory Loss Father     COPD Father     Diabetes Maternal Grandmother     Cancer Other      CURRENT MEDICATIONS:  Current Outpatient Medications   Medication Sig Dispense Refill    ELDERBERRY PO Take by mouth      VENTOLIN  (90 Base) MCG/ACT inhaler Inhale 2 puffs into the lungs every 6 hours as needed for Wheezing or Shortness of Breath 3 Inhaler 3    traZODone (DESYREL) 150 MG tablet Take 150 mg by mouth nightly      calcium carbonate (OSCAL) 500 MG TABS tablet Take 500 mg by mouth daily      albuterol (PROVENTIL) (2.5 MG/3ML) 0.083% nebulizer solution Take 3 mLs by nebulization every 6 hours as needed for Wheezing or Shortness of Breath 120 vial 11    Tiotropium Bromide-Olodaterol (STIOLTO RESPIMAT) 2.5-2.5 MCG/ACT AERS Inhale 2 puffs into the lungs daily 3 Inhaler 3    clonazePAM (KLONOPIN) 0.25 MG disintegrating tablet Take 0.25 mg by mouth 2 times daily as needed.  lamoTRIgine (LAMICTAL) 100 MG tablet Take 100 mg by mouth daily      Cholecalciferol (VITAMIN D PO) Take by mouth daily      vitamin C (ASCORBIC ACID) 500 MG tablet Take 500 mg by mouth daily      Pyridoxine HCl (VITAMIN B-6) 100 MG tablet Take 100 mg by mouth daily.  aspirin 81 MG EC tablet Take 81 mg by mouth daily.  folic acid (FOLVITE) 1 MG tablet Take 1 mg by mouth daily.  omeprazole (PRILOSEC) 20 MG capsule Take 40 mg by mouth Daily       potassium chloride (MICRO-K) 10 MEQ extended release capsule Take 10 mEq by mouth daily      Furosemide (LASIX PO) Take by mouth       No current facility-administered medications for this visit. Imelda SAM   Review of Systems   Constitutional: Negative for activity change, appetite change, chills, fatigue, fever and unexpected weight change. HENT: Negative. Eyes: Negative. Respiratory: Positive for shortness of breath (at times) and wheezing (at times ). Negative for cough. Cardiovascular: Negative for chest pain, palpitations and leg swelling. Gastrointestinal: Negative for abdominal pain, diarrhea, nausea and vomiting. Genitourinary: Negative. Musculoskeletal: Negative. Skin: Negative. Neurological: Negative. Hematological: Does not bruise/bleed easily. Psychiatric/Behavioral: Negative for sleep disturbance and suicidal ideas. Physical exam   /62 (Site: Left Upper Arm, Position: Sitting, Cuff Size: Medium Adult)   Pulse 85   Temp 98.6 °F (37 °C)   Ht 5' 3\" (1.6 m)   Wt 168 lb (76.2 kg)   SpO2 98% Comment: on room air  BMI 29.76 kg/m²      Wt Readings from Last 3 Encounters:   01/13/21 168 lb (76.2 kg)   09/02/20 169 lb 9.6 oz (76.9 kg)   08/26/20 170 lb (77.1 kg)     Physical Exam  Vitals signs and nursing note reviewed. Constitutional:       General: She is not in acute distress. Appearance: She is well-developed. HENT:      Mouth/Throat:      Lips: Pink. Mouth: Mucous membranes are moist.      Pharynx: Oropharynx is clear. No oropharyngeal exudate or posterior oropharyngeal erythema. Eyes:      Conjunctiva/sclera: Conjunctivae normal.   Neck:      Vascular: No JVD. Cardiovascular:      Rate and Rhythm: Normal rate and regular rhythm. Heart sounds: No murmur. No friction rub. Pulmonary:      Effort: Pulmonary effort is normal. No accessory muscle usage or respiratory distress. Breath sounds: Decreased air movement present. No wheezing, rhonchi or rales. Comments: Decreased air movement in all lung fields   Chest:      Chest wall: No tenderness. Musculoskeletal:      Right lower leg: No edema. Left lower leg: No edema. Skin:     General: Skin is warm and dry. Capillary Refill: Capillary refill takes less than 2 seconds. Nails: There is no clubbing. Neurological:      Mental Status: She is alert.    Psychiatric:         Mood and Affect: Mood normal.         Behavior: Behavior normal. Thought Content: Thought content normal.         Judgment: Judgment normal.          Test results   Lung Nodule Screening     [x] Qualifies    []Does not qualify   [] Declined    [x] Completed 5/2020     Assessment/ Plan    Nicotine dependence  Annual LDCT lung screen- order placed     Stage 3 severe COPD by GOLD classification ([facility] [address]  Continue Stiolto / Ventolin/ PRN albuterol nebs  Avoidance of ill contacts  Good handwashing and wear mask in public  Keep UTD with vaccinations  Do recommend she consider COVID vaccine once available     Total time spent interviewing the patient, evaluating lab data and X-ray data and processing orders was 30 min . I personally spent more than 50% of the appointment time face to face with the patient providing counseling and coordinating the patient's care. Will see Ashlyn Johnson in: 3 months    Electronically signed by RIGOBERTO Bean CNP on 1/13/2021 at 11:17 AM   Low Dose CT (LDCT) Lung Screening criteria met   Age 50-69   Pack year smoking >30   Still smoking or less than 15 year since quit   No sign or symptoms of lung cancer   > 11 months since last LDCT     Risks and benefits of lung cancer screening with LDCT scans discussed:    Significance of positive screen - False-positive LDCT results often occur. 95% of all positive results do not lead to a diagnosis of cancer. Usually further imaging can resolve most false-positive results; however, some patients may require invasive procedures. Over diagnosis risk - 10% to 12% of screen-detected lung cancer cases are over diagnosed--that is, the cancer would not have been detected in the patient's lifetime without the screening. Need for follow up screens annually to continue lung cancer screening effectiveness     Risks associated with radiation from annual LDCT- Radiation exposure is about the same as for a mammogram, which is about 1/3 of the annual background radiation exposure from everyday life.   Starting screening at age 54 is not likely to increase cancer risk from radiation exposure. Patients with comorbidities resulting in life expectancy of < 10 years, or that would preclude treatment of an abnormality identified on CT, should not be screened due to lack of benefit.     To obtain maximal benefit from this screening, smoking cessation and long-term abstinence from smoking is critical

## 2021-02-22 ENCOUNTER — TELEPHONE (OUTPATIENT)
Dept: PULMONOLOGY | Age: 71
End: 2021-02-22

## 2021-02-22 DIAGNOSIS — J44.9 STAGE 3 SEVERE COPD BY GOLD CLASSIFICATION (HCC): Primary | ICD-10-CM

## 2021-02-22 RX ORDER — ALBUTEROL SULFATE 90 UG/1
2 AEROSOL, METERED RESPIRATORY (INHALATION) 4 TIMES DAILY PRN
Qty: 3 INHALER | Refills: 2 | Status: SHIPPED | OUTPATIENT
Start: 2021-02-22 | End: 2021-07-21 | Stop reason: SDUPTHER

## 2021-02-22 NOTE — TELEPHONE ENCOUNTER
Contacted pt insurance they prefer Albuterol Sulfate inhaler over Ventolin however they have given patient a temporary supply,Can you order the Albuterol inhaler. Please advise

## 2021-03-22 RX ORDER — TIOTROPIUM BROMIDE AND OLODATEROL 3.124; 2.736 UG/1; UG/1
SPRAY, METERED RESPIRATORY (INHALATION)
Qty: 12 G | Refills: 3 | Status: SHIPPED | OUTPATIENT
Start: 2021-03-22 | End: 2022-02-22

## 2021-06-22 NOTE — TELEPHONE ENCOUNTER
We discontinued Proair back in 08/2020 because patient did not like it. Patient just was sent a refill on her Ventolin but instead they sent Proair. Patient will contact insurance company to find out why they did that.

## 2021-07-08 ENCOUNTER — HOSPITAL ENCOUNTER (OUTPATIENT)
Dept: CT IMAGING | Age: 71
Discharge: HOME | End: 2021-07-08
Payer: MEDICARE

## 2021-07-08 DIAGNOSIS — F17.218 CIGARETTE NICOTINE DEPENDENCE WITH OTHER NICOTINE-INDUCED DISORDER: ICD-10-CM

## 2021-07-08 PROCEDURE — 71271 CT THORAX LUNG CANCER SCR C-: CPT

## 2021-07-17 ENCOUNTER — HOSPITAL ENCOUNTER (EMERGENCY)
Age: 71
Discharge: HOME | End: 2021-07-17
Attending: FAMILY MEDICINE
Payer: COMMERCIAL

## 2021-07-17 VITALS
SYSTOLIC BLOOD PRESSURE: 152 MMHG | BODY MASS INDEX: 30.12 KG/M2 | WEIGHT: 170 LBS | OXYGEN SATURATION: 98 % | RESPIRATION RATE: 16 BRPM | TEMPERATURE: 97.5 F | HEART RATE: 88 BPM | HEIGHT: 63 IN | DIASTOLIC BLOOD PRESSURE: 71 MMHG
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
## 2021-07-17 DIAGNOSIS — S01.81XA FACIAL LACERATION, INITIAL ENCOUNTER: Primary | ICD-10-CM

## 2021-07-17 DIAGNOSIS — S80.01XA CONTUSION OF RIGHT KNEE, INITIAL ENCOUNTER: ICD-10-CM

## 2021-07-17 DIAGNOSIS — S51.012A SKIN TEAR OF LEFT ELBOW WITHOUT COMPLICATION, INITIAL ENCOUNTER: ICD-10-CM

## 2021-07-17 PROCEDURE — 99282 EMERGENCY DEPT VISIT SF MDM: CPT

## 2021-07-17 PROCEDURE — 12011 RPR F/E/E/N/L/M 2.5 CM/<: CPT

## 2021-07-17 ASSESSMENT — ENCOUNTER SYMPTOMS
DIARRHEA: 0
WHEEZING: 0
SORE THROAT: 0
CHEST TIGHTNESS: 0
BACK PAIN: 0
COLOR CHANGE: 0
ABDOMINAL PAIN: 0
SINUS PRESSURE: 0
VOMITING: 0
SHORTNESS OF BREATH: 0
NAUSEA: 0

## 2021-07-17 ASSESSMENT — PAIN SCALES - GENERAL: PAINLEVEL_OUTOF10: 3

## 2021-07-17 ASSESSMENT — PAIN DESCRIPTION - LOCATION: LOCATION: NOSE

## 2021-07-17 ASSESSMENT — PAIN DESCRIPTION - DESCRIPTORS: DESCRIPTORS: ACHING

## 2021-07-17 ASSESSMENT — PAIN DESCRIPTION - PAIN TYPE: TYPE: ACUTE PAIN

## 2021-07-17 NOTE — ED PROVIDER NOTES
[address] [facility]          CHIEF COMPLAINT       Chief Complaint   Patient presents with    Fall       Nurses Notes reviewed and I agree except as noted in the HPI. HISTORY OF PRESENT ILLNESS    Kelsi Moreno is a 78 y.o. female who presents for evaluation of injuries sustained after trip and fall event while at work. Patient was carrying plates when she excellently tripped on carpeting landing face forward. She sustained an abrasion to the left elbow and the lower lip in addition to her nasal bridge. She states that lacerations to the face were noted secondary to the plates hitting her. Patient also notes some discomfort to the right knee. She is already developing some bruising to this region. Event occurred approximately 40-50 minutes prior to presentation. Patient sustained no loss of conscious. She denies presence of any dizziness or lightheadedness. Her nose did not bleed. Wound bled but bleeding has since stopped. REVIEW OF SYSTEMS     Review of Systems   Constitutional: Negative for appetite change, chills, fatigue and fever. HENT: Negative for sinus pressure and sore throat. Respiratory: Negative for chest tightness, shortness of breath and wheezing. Cardiovascular: Negative for chest pain and leg swelling. Gastrointestinal: Negative for abdominal pain, diarrhea, nausea and vomiting. Genitourinary: Negative for dysuria, flank pain, frequency, vaginal bleeding and vaginal discharge. Musculoskeletal: Positive for arthralgias. Negative for back pain, gait problem, joint swelling and neck stiffness. Skin: Positive for wound. Negative for color change and rash. Neurological: Negative for dizziness, light-headedness and headaches. Psychiatric/Behavioral: Negative for agitation and hallucinations. The patient is not nervous/anxious.         PAST MEDICAL HISTORY    has a past medical history of Acid reflux disease, COPD (chronic obstructive pulmonary disease) ([facility] [address] and Depression. SURGICAL HISTORY      has a past surgical history that includes Appendectomy; Hysterectomy; Colonoscopy; Tubal ligation; Upper gastrointestinal endoscopy; cyst removal; and pr lap,cholecystectomy (N/A, 1/29/2018). CURRENT MEDICATIONS       Discharge Medication List as of 7/17/2021  3:32 PM      CONTINUE these medications which have NOT CHANGED    Details   STIOLTO RESPIMAT 2.5-2.5 MCG/ACT AERS USE 2 INHALATIONS DAILY, Disp-12 g, R-3Normal      albuterol sulfate HFA (VENTOLIN HFA) 108 (90 Base) MCG/ACT inhaler Inhale 2 puffs into the lungs 4 times daily as needed for Wheezing, Disp-3 Inhaler, R-2Discontinue Ventolin, [facility]      ELDERBERRY PO Take by mouthHistorical Med      traZODone (DESYREL) 150 MG tablet Take 150 mg by mouth nightlyHistorical Med      calcium carbonate (OSCAL) 500 MG TABS tablet Take 500 mg by mouth dailyHistorical Med      albuterol (PROVENTIL) (2.5 MG/3ML) 0.083% nebulizer solution Take 3 mLs by nebulization every 6 hours as needed for Wheezing or Shortness of Breath, Disp-120 vial, R-11Normal      potassium chloride (MICRO-K) 10 MEQ extended release capsule Take 10 mEq by mouth dailyHistorical Med      Furosemide (LASIX PO) Take by mouthHistorical Med      clonazePAM (KLONOPIN) 0.25 MG disintegrating tablet Take 0.25 mg by mouth 2 times daily as needed. Historical Med      lamoTRIgine (LAMICTAL) 100 MG tablet Take 100 mg by mouth dailyHistorical Med      Cholecalciferol (VITAMIN D PO) Take by mouth dailyHistorical Med      vitamin C (ASCORBIC ACID) 500 MG tablet Take 500 mg by mouth dailyHistorical Med      Pyridoxine HCl (VITAMIN B-6) 100 MG tablet Take 100 mg by mouth daily. aspirin 81 MG EC tablet Take 81 mg by mouth daily. folic acid (FOLVITE) 1 MG tablet Take 1 mg by mouth daily.         omeprazole (PRILOSEC) 20 MG capsule Take 40 mg by mouth Daily Historical Med ALLERGIES     is allergic to amoxicillin, hydrochlorothiazide, and adhesive tape. FAMILY HISTORY     She indicated that her mother is . She indicated that her father is . She indicated that the status of her maternal grandmother is unknown. She indicated that the status of her other is unknown.   family history includes COPD in her father and mother; Cancer in an other family member; Diabetes in her maternal grandmother; Heart Disease in her father and mother; Memory Loss in her father and mother. SOCIAL HISTORY      reports that she has been smoking cigarettes. She started smoking about 46 years ago. She has a 30.00 pack-year smoking history. She has never used smokeless tobacco. She reports that she does not drink alcohol and does not use drugs. PHYSICAL EXAM     INITIAL VITALS:  height is 5' 3\" (1.6 m) and weight is 170 lb (77.1 kg). Her tympanic temperature is 97.5 °F (36.4 °C). Her blood pressure is 152/71 (abnormal) and her pulse is 88. Her respiration is 16 and oxygen saturation is 98%. Physical Exam  Vitals and nursing note reviewed. Constitutional:       General: She is not in acute distress. Appearance: She is well-developed. HENT:      Head: Normocephalic and atraumatic. Nose: No congestion or rhinorrhea. Comments: No blood noted bilateral nares. Eyes:      General:         Right eye: No discharge. Left eye: No discharge. Conjunctiva/sclera: Conjunctivae normal.   Cardiovascular:      Rate and Rhythm: Normal rate and regular rhythm. Heart sounds: Normal heart sounds. Pulmonary:      Effort: Pulmonary effort is normal.      Breath sounds: Normal breath sounds. Abdominal:      General: Bowel sounds are normal. There is no distension. Palpations: Abdomen is soft. There is no mass. Tenderness: There is no abdominal tenderness. Musculoskeletal:      Cervical back: Normal range of motion. No tenderness.    Skin:     General: Skin is warm and dry. Comments: Patient has a 1.2 cm laceration on the bridge of the nose that is not actively bleeding. She has a scabbed lesion just inferior to the lip lips. Patient has some bruising noted to the anterior right knee where associated minimal swelling is noted. Patient has a 1.5 cm x 2 cm skin tear to the lateral aspect of the left elbow that is not actively bleeding. Neurological:      General: No focal deficit present. Mental Status: She is alert and oriented to person, place, and time. Deep Tendon Reflexes: Reflexes normal.   Psychiatric:         Behavior: Behavior normal.           DIFFERENTIAL DIAGNOSIS:   Laceration, abrasion, contusion, nasal fracture    DIAGNOSTIC RESULTS         RADIOLOGY: non-plain filmimages(s) such as CT, Ultrasound and MRI are read by the radiologist.  No orders to display         LABS:   Labs Reviewed - No data to display    DEPARTMENT COURSE:   Vitals:    Vitals:    07/17/21 1413   BP: (!) 152/71   Pulse: 88   Resp: 16   Temp: 97.5 °F (36.4 °C)   TempSrc: Tympanic   SpO2: 98%   Weight: 170 lb (77.1 kg)   Height: 5' 3\" (1.6 m)       MDM:  Patient presents for evaluation of injury sustained after her trip and fall event at work. Wounds were appropriately cleansed with MicroKlenz and appropriate wound care provided. Wound care instructions were discussed with patient. Patient declined nose x-ray imaging to evaluate for fracture. Patient does not feel that her nose is broken. Patient is recommended to ice the affected right knee and take over-the-counter analgesics as needed as directed. She will follow-up with occupational as needed.     CRITICAL CARE:   None    CONSULTS:  None    PROCEDURES:  Lac Repair    Date/Time: 7/17/2021 8:11 PM  Performed by: Larayne Meckel, MD  Authorized by: Larayne Meckel, MD     Consent:     Consent obtained:  Verbal    Consent given by:  Patient    Risks discussed:  Infection, poor cosmetic result and poor wound healing  Anesthesia (see MAR for exact dosages): Anesthesia method:  None  Laceration details:     Location:  Face    Face location:  Nose    Length (cm):  1.2  Repair type:     Repair type:  Simple  Treatment:     Wound cleansed with: MicroKlenz. Amount of cleaning:  Standard    Irrigation solution:  Sterile saline    Irrigation method:  Pressure wash  Skin repair:     Repair method:  Tissue adhesive  Post-procedure details:     Patient tolerance of procedure: Tolerated well, no immediate complications  Comments:      1.5 cm x 2 cm skin tear noted to the lateral aspect of the left elbow was appropriately cleansed with MicroKlenz and dried with sterile dressing (performed by me). Xeroform gauze dressing was applied by nurse followed by sterile gauze. FINAL IMPRESSION      1. Facial laceration, initial encounter    2. Skin tear of left elbow without complication, initial encounter    3. Contusion of right knee, initial encounter    4.  Fall from slip, trip, or stumble, initial encounter          DISPOSITION/PLAN   Discharge    PATIENT REFERRED TO:  Barbara Jackson located on Injury Discharge Instruction Sheet          DISCHARGEMEDICATIONS:  Discharge Medication List as of 7/17/2021  3:32 PM          (Please note that portions of this note were completedwith a voice recognition program.  Efforts were made to edit the dictations but occasionally words are mis-transcribed.)    MD Homer Tavera MD  07/17/21 2013

## 2021-07-17 NOTE — ED NOTES
Pt resting, resp even and unlabored, skin pink, warm and dry. Elbow abrasion cleansed with wound cleanser and xerefoam applied and wrapped with veronique. Pt given discharge instructions and verbalizes understanding, pt released.      Chano Salas RN  07/17/21 1212

## 2021-07-17 NOTE — ED NOTES
Pt was carrying a plate and tripped and fell. Pt denies LOC. Skin tear noted on l elbow area. Pt also complains of r knee pain and nose pain. Superficial laceration 2cm noted on upper nose, bleeding controlled, pt using ice on area. Pt alert, resp even and unlabored, skin pink, warm and dry.      Dmitry Blanc RN  07/17/21 1762

## 2021-07-19 ENCOUNTER — HOSPITAL ENCOUNTER (OUTPATIENT)
Dept: GENERAL RADIOLOGY | Age: 71
Discharge: HOME | End: 2021-07-19
Payer: COMMERCIAL

## 2021-07-19 VITALS
OXYGEN SATURATION: 99 % | DIASTOLIC BLOOD PRESSURE: 78 MMHG | RESPIRATION RATE: 20 BRPM | SYSTOLIC BLOOD PRESSURE: 135 MMHG | HEART RATE: 88 BPM | TEMPERATURE: 97.8 F

## 2021-07-19 PROCEDURE — 73564 X-RAY EXAM KNEE 4 OR MORE: CPT

## 2021-07-19 PROCEDURE — 99212 OFFICE O/P EST SF 10 MIN: CPT

## 2021-07-19 NOTE — PROGRESS NOTES
[address]  Phone: Mony Hubbard 178 RECHECK ENCOUNTER      CHIEF COMPLAINT    No chief complaint on file. FANNY Torres is a 78 y.o. female who presents for recheck Workman's compensation. She was seen here 2 days ago after she fell at work. She is here today for recheck. She stated that her right knee started hurting a few hours after the fall. She describes her right knee pain as mild, sharp, intermittent pain which is worse with walking. Pain did not radiate anywhere. She said that her right wrist pain has solved. She denies head or neck injury.     PAST MEDICAL HISTORY    Past Medical History:   Diagnosis Date    Acid reflux disease     COPD (chronic obstructive pulmonary disease) ([facility] [address]     Depression        SURGICAL HISTORY    Past Surgical History:   Procedure Laterality Date    APPENDECTOMY      COLONOSCOPY      CYST REMOVAL      multiple    HYSTERECTOMY      CO LAP,CHOLECYSTECTOMY N/A 1/29/2018    CHOLECYSTECTOMY LAPAROSCOPIC performed by Jarek Sin DO at [address] ENDOSCOPY         CURRENT MEDICATIONS    Current Outpatient Rx   Medication Sig Dispense Refill    STIOLTO RESPIMAT 2.5-2.5 MCG/ACT AERS USE 2 INHALATIONS DAILY 12 g 3    albuterol sulfate HFA (VENTOLIN HFA) 108 (90 Base) MCG/ACT inhaler Inhale 2 puffs into the lungs 4 times daily as needed for Wheezing 3 Inhaler 2    ELDERBERRY PO Take by mouth      traZODone (DESYREL) 150 MG tablet Take 150 mg by mouth nightly      calcium carbonate (OSCAL) 500 MG TABS tablet Take 500 mg by mouth daily      albuterol (PROVENTIL) (2.5 MG/3ML) 0.083% nebulizer solution Take 3 mLs by nebulization every 6 hours as needed for Wheezing or Shortness of Breath 120 vial 11    potassium chloride (MICRO-K) 10 MEQ extended release capsule Take 10 mEq by mouth daily      Furosemide of Transportation (Medical):  Lack of Transportation (Non-Medical):    Physical Activity:     Days of Exercise per Week:     Minutes of Exercise per Session:    Stress:     Feeling of Stress :    Social Connections:     Frequency of Communication with Friends and Family:     Frequency of Social Gatherings with Friends and Family:     Attends Yarsanism Services:     Active Member of Clubs or Organizations:     Attends Club or Organization Meetings:     Marital Status:    Intimate Partner Violence:     Fear of Current or Ex-Partner:     Emotionally Abused:     Physically Abused:     Sexually Abused:        REVIEW OF SYSTEMS      All systems negative except as marked. PHYSICAL EXAM    VITAL SIGNS: There were no vitals taken for this visit. Constitutional:  Alert, does not appear in any obvious distress  HENT: There is a healed abrasion to the nasal bridge. Cervical Spine: Normal range of motion, no tenderness  Musculoskeletal: Right knee examination showed mild swelling and tenderness involving the infrapatellar and proximal tibial area. She has normal passive full range of motion. Has normal pedal pulses. Back:No tenderness. Neurologic:  Alert & oriented x 3, Normal motor function, Normal sensory function, No focal deficits noted. RADIOLOGY    XR KNEE RIGHT (MIN 4 VIEWS)    (Results Pending)   Right knee x-ray showed effusion but no fracture. PROCEDURES    none      ASSESSMENT and DECISION MAKING    Patient presented with right knee pain after falling at work 2 days ago. She is able to walk with mild discomfort. She received Ace wrap in the ED. I discussed diagnosis and treatment plan with her. She was given 3 days for light duty with minimal standing at work. CONSULTS:  None        Diagnosis:      Right knee contusion  Right knee pain  Restrictions:     Work in a seated position for 3 days      PATIENT REFERRED TO:  Follow-up here in 3 days      DISCHARGE MEDICATIONS:  Ice and elevate right knee  Ace wrap  Take 3 ibuprofen OTC every 8 hours as needed for pain  Light duty for 3 days

## 2021-07-19 NOTE — ED NOTES
Pt. Presents ambulatory to ED for worker comp recheck after fall on Saturday. Pt. C/o rt. Knee pain and rt. Wrist pain. Pt. Has CMS intact, bandaid to left elbow. Faheem Orourke RN  07/19/21 9882

## 2021-07-19 NOTE — ED NOTES
6\" ace wrap applied to rt. Knee, bandaid replaced to left elbow. Injury discharge treatment forrm rev'd with pt. And copy given. Pulse regular. Extremities warm. Respirations regular and quiet. Mucous membranes pink & moist. Alert and oriented times 3. No nausea or vomiting. Range of motion within patient's limits. Skin pink, warm and dry. Calm and cooperative.      Shandra Elias RN  07/19/21 4021

## 2021-07-21 DIAGNOSIS — J44.9 STAGE 3 SEVERE COPD BY GOLD CLASSIFICATION (HCC): ICD-10-CM

## 2021-07-21 RX ORDER — ALBUTEROL SULFATE 90 UG/1
2 AEROSOL, METERED RESPIRATORY (INHALATION) 4 TIMES DAILY PRN
Qty: 3 INHALER | Refills: 2 | Status: SHIPPED | OUTPATIENT
Start: 2021-07-21 | End: 2021-08-10 | Stop reason: SDUPTHER

## 2021-07-21 NOTE — TELEPHONE ENCOUNTER
Luis Eduardo Franco called requesting a refill on the following medications:  Requested Prescriptions     Pending Prescriptions Disp Refills    albuterol sulfate HFA (VENTOLIN HFA) 108 (90 Base) MCG/ACT inhaler 3 Inhaler 2     Sig: Inhale 2 puffs into the lungs 4 times daily as needed for Wheezing     Pharmacy [facility]:  [facility]    Date of last visit: 01/13/2021  Date of next visit (if applicable): 1/04/9876

## 2021-07-22 ENCOUNTER — HOSPITAL ENCOUNTER (OUTPATIENT)
Dept: GENERAL RADIOLOGY | Age: 71
Discharge: HOME | End: 2021-07-22
Payer: COMMERCIAL

## 2021-07-22 VITALS
OXYGEN SATURATION: 97 % | HEART RATE: 96 BPM | RESPIRATION RATE: 18 BRPM | SYSTOLIC BLOOD PRESSURE: 131 MMHG | TEMPERATURE: 97.7 F | DIASTOLIC BLOOD PRESSURE: 67 MMHG

## 2021-07-22 PROCEDURE — 99212 OFFICE O/P EST SF 10 MIN: CPT

## 2021-07-22 NOTE — ED PROVIDER NOTES
[facility]  eMERGENCY dEPARTMENT eNCOUnter             [address]    CHIEF COMPLAINT    No chief complaint on file. Nurses Notes reviewed and I agree except as noted in the HPI. HPI    Mariel Pablo is a 78 y.o. female who presents for follow-up on injury from 7/16/2021. She fell while at work, carrying some plates. She was seen here for evaluation on 7/19/2021, and was given some days off work. She now returns for recheck. She states that the pain in her nose, right knee, left elbow are much better. She is able to ambulate without great difficulty. She still feels sore, especially in the knee. Over-the-counter treatments help a little bit. She has been using a wrap she had at home that also helps. She would like to return to work. REVIEW OF SYSTEMS      Review of Systems   All other systems reviewed and are negative. PAST MEDICAL HISTORY     has a past medical history of Acid reflux disease, COPD (chronic obstructive pulmonary disease) ([address] and Depression. SURGICAL HISTORY     has a past surgical history that includes Appendectomy; Hysterectomy; Colonoscopy; Tubal ligation; Upper gastrointestinal endoscopy; cyst removal; and pr lap,cholecystectomy (N/A, 1/29/2018). CURRENT MEDICATIONS    Previous Medications    ALBUTEROL (PROVENTIL) (2.5 MG/3ML) 0.083% NEBULIZER SOLUTION    Take 3 mLs by nebulization every 6 hours as needed for Wheezing or Shortness of Breath    ALBUTEROL SULFATE HFA (VENTOLIN HFA) 108 (90 BASE) MCG/ACT INHALER    Inhale 2 puffs into the lungs 4 times daily as needed for Wheezing    ASPIRIN 81 MG EC TABLET    Take 81 mg by mouth daily. CALCIUM CARBONATE (OSCAL) 500 MG TABS TABLET    Take 500 mg by mouth daily    CHOLECALCIFEROL (VITAMIN D PO)    Take by mouth daily    CLONAZEPAM (KLONOPIN) 0.25 MG DISINTEGRATING TABLET    Take 0.25 mg by mouth 2 times daily as needed.     ELDERBERRY PO    Take by mouth    FOLIC ACID (FOLVITE) 1 MG TABLET    Take 1 mg by mouth daily. FUROSEMIDE (LASIX PO)    Take by mouth    LAMOTRIGINE (LAMICTAL) 100 MG TABLET    Take 100 mg by mouth daily    OMEPRAZOLE (PRILOSEC) 20 MG CAPSULE    Take 40 mg by mouth Daily     POTASSIUM CHLORIDE (MICRO-K) 10 MEQ EXTENDED RELEASE CAPSULE    Take 10 mEq by mouth daily    PYRIDOXINE HCL (VITAMIN B-6) 100 MG TABLET    Take 100 mg by mouth daily. STIOLTO RESPIMAT 2.5-2.5 MCG/ACT AERS    USE 2 INHALATIONS DAILY    TRAZODONE (DESYREL) 150 MG TABLET    Take 150 mg by mouth nightly    VITAMIN C (ASCORBIC ACID) 500 MG TABLET    Take 500 mg by mouth daily       ALLERGIES    is allergic to amoxicillin, hydrochlorothiazide, and adhesive tape. FAMILY HISTORY    She indicated that her mother is . She indicated that her father is . She indicated that the status of her maternal grandmother is unknown. She indicated that the status of her other is unknown.   family history includes COPD in her father and mother; Cancer in an other family member; Diabetes in her maternal grandmother; Heart Disease in her father and mother; Memory Loss in her father and mother. SOCIAL HISTORY     reports that she has been smoking cigarettes. She started smoking about 46 years ago. She has a 30.00 pack-year smoking history. She has never used smokeless tobacco. She reports that she does not drink alcohol and does not use drugs. PHYSICAL EXAM       INITIAL VITALS: /67   Pulse 96   Temp 97.7 °F (36.5 °C) (Temporal)   Resp 18   SpO2 97%      Physical Exam  Vitals and nursing note reviewed. Constitutional:       General: She is not in acute distress. HENT:      Nose:      Comments: There is a tiny, close laceration on the bridge of her nose. There is also a small, closed laceration left side of her chin area. No deformity, bruising. Eyes:      Pupils: Pupils are equal, round, and reactive to light.    Musculoskeletal:      Comments: Fading bruise right knee, full range of motion, knee is stable. Slightly tender over the area of bruising on the right knee. No crepitus. Her gait is normal.   Skin:     General: Skin is warm and dry. Neurological:      General: No focal deficit present. Mental Status: She is alert and oriented to person, place, and time. Psychiatric:         Behavior: Behavior normal.               Vitals:    Vitals:    07/22/21 0917   BP: 131/67   Pulse: 96   Resp: 18   Temp: 97.7 °F (36.5 °C)   TempSrc: Temporal   SpO2: 97%       EMERGENCY DEPARTMENT COURSE:    She will continue to wear a knee brace as needed. I have released her to go back to work. Over-the-counter pain medication as needed. Appropriate forms completed. FINAL IMPRESSION      Fall with multiple contusions    DISPOSITION/PLAN    DISPOSITION  Home, stable, instructions given. Return as needed.       (Please note that portions of this note were completed with a voice recognition program.  Efforts were made to edit the dictations but occasionally words are mis-transcribed.)      Argentina Kim MD  07/22/21 6685

## 2021-07-22 NOTE — ED NOTES
Injury treatment form Discharge instructions rev'd with pt.and copy given. Pulse regular. Extremities warm. Respirations regular and quiet. Mucous membranes pink & moist. Alert and oriented times 3. No nausea or vomiting. Range of motion within patient's limits. Skin pink, warm and dry. Calm and cooperative.      Raj Mart RN  07/22/21 5419

## 2021-07-22 NOTE — ED NOTES
Pt. Presents ambulatory to ED for worker comp recheck. Pt. Voices decreased knee pain, Abrasions to face and left elbow without signs of redness or drainage. Pt. Voices she is ready to return to work full duty. Edema and slight bruising note to rt. Knee. Jennifer Lake, GM  07/22/21 1749 Alessandra Higginbotham RN  07/22/21 7351

## 2021-08-09 ENCOUNTER — TELEPHONE (OUTPATIENT)
Dept: PULMONOLOGY | Age: 71
End: 2021-08-09

## 2021-08-09 DIAGNOSIS — J44.9 STAGE 3 SEVERE COPD BY GOLD CLASSIFICATION (HCC): ICD-10-CM

## 2021-08-09 NOTE — TELEPHONE ENCOUNTER
Patient is calling in regarding her ventolin inhaler that was recently filled to [facility]. She said [facility] told her the office cancelled the ventolin and prescribed the generic and they are wanting to send her the proair which she will not use. She said the prescription needs to be for the name brand Ventolin. Please verify and resend if ok.

## 2021-08-10 NOTE — TELEPHONE ENCOUNTER
The reason for that was because I received a letter from her insurance that they would not cover Ventolin.   I can send ANASTASIYA but if insurance does not cover she will have high out of pocket cost.  Let me know

## 2021-08-10 NOTE — TELEPHONE ENCOUNTER
Patient states  [facility] and they state she is pre-approved for Ventolin ( ANASTASIYA).  I told patient if they do not cover she will have to pay high out of pocket cost- she states she will not and that  she is pre-approved she insists on having the script sent in ventolin (ANASTASIYA)

## 2021-08-11 ENCOUNTER — HOSPITAL ENCOUNTER (OUTPATIENT)
Age: 71
Discharge: HOME | End: 2021-08-11
Payer: MEDICARE

## 2021-08-11 ENCOUNTER — HOSPITAL ENCOUNTER (OUTPATIENT)
Dept: CT IMAGING | Age: 71
Discharge: HOME | End: 2021-08-11
Payer: MEDICARE

## 2021-08-11 DIAGNOSIS — R22.0 FACIAL SWELLING: ICD-10-CM

## 2021-08-11 DIAGNOSIS — K11.23 CHRONIC PAROTITIS: ICD-10-CM

## 2021-08-11 LAB
CREATININE, WHOLE BLOOD: 0.7 MG/DL (ref 0.5–1.2)
ESTIMATED GFR, PCACC: 88 ML/MIN/1.73M2
RHEUMATOID FACTOR: < 10 IU/ML (ref 0–13)

## 2021-08-11 PROCEDURE — 86038 ANTINUCLEAR ANTIBODIES: CPT

## 2021-08-11 PROCEDURE — 82565 ASSAY OF CREATININE: CPT

## 2021-08-11 PROCEDURE — 36415 COLL VENOUS BLD VENIPUNCTURE: CPT

## 2021-08-11 PROCEDURE — 86430 RHEUMATOID FACTOR TEST QUAL: CPT

## 2021-08-11 PROCEDURE — 6360000004 HC RX CONTRAST MEDICATION: Performed by: STUDENT IN AN ORGANIZED HEALTH CARE EDUCATION/TRAINING PROGRAM

## 2021-08-11 PROCEDURE — 86235 NUCLEAR ANTIGEN ANTIBODY: CPT

## 2021-08-11 PROCEDURE — 70491 CT SOFT TISSUE NECK W/DYE: CPT

## 2021-08-11 RX ADMIN — IOPAMIDOL 100 ML: 755 INJECTION, SOLUTION INTRAVENOUS at 07:45

## 2021-08-14 LAB
ANA SCREEN: NORMAL
ANTI SSA: NORMAL
ANTI SSB: 0 AU/ML (ref 0–40)

## 2021-09-09 ENCOUNTER — TELEPHONE (OUTPATIENT)
Dept: PULMONOLOGY | Age: 71
End: 2021-09-09

## 2021-09-09 DIAGNOSIS — J44.9 STAGE 3 SEVERE COPD BY GOLD CLASSIFICATION (HCC): Primary | ICD-10-CM

## 2021-09-09 NOTE — TELEPHONE ENCOUNTER
Pt is [facility] pt but [facility] is calling stating that they need a Covid order placed so they can do the patients PFT on the 14th. She has to be swabbed no later than Monday the 13th.  Thank you    Fax: [phone]

## 2021-09-14 DIAGNOSIS — J44.9 STAGE 2 MODERATE COPD BY GOLD CLASSIFICATION (HCC): ICD-10-CM

## 2021-09-14 RX ORDER — ALBUTEROL SULFATE 2.5 MG/3ML
SOLUTION RESPIRATORY (INHALATION)
Qty: 375 ML | Refills: 11 | Status: SHIPPED | OUTPATIENT
Start: 2021-09-14 | End: 2022-10-26

## 2021-09-21 ENCOUNTER — OFFICE VISIT (OUTPATIENT)
Dept: PULMONOLOGY | Age: 71
End: 2021-09-21
Payer: MEDICARE

## 2021-09-21 VITALS
DIASTOLIC BLOOD PRESSURE: 82 MMHG | HEART RATE: 99 BPM | TEMPERATURE: 96 F | BODY MASS INDEX: 29.45 KG/M2 | SYSTOLIC BLOOD PRESSURE: 120 MMHG | HEIGHT: 63 IN | WEIGHT: 166.2 LBS | OXYGEN SATURATION: 96 %

## 2021-09-21 DIAGNOSIS — J44.9 STAGE 3 SEVERE COPD BY GOLD CLASSIFICATION (HCC): Primary | ICD-10-CM

## 2021-09-21 DIAGNOSIS — F17.218 CIGARETTE NICOTINE DEPENDENCE WITH OTHER NICOTINE-INDUCED DISORDER: ICD-10-CM

## 2021-09-21 DIAGNOSIS — J43.2 CENTRILOBULAR EMPHYSEMA (HCC): ICD-10-CM

## 2021-09-21 PROCEDURE — 99214 OFFICE O/P EST MOD 30 MIN: CPT | Performed by: NURSE PRACTITIONER

## 2021-09-21 ASSESSMENT — ENCOUNTER SYMPTOMS
DIARRHEA: 0
COUGH: 1
CHEST TIGHTNESS: 0
SHORTNESS OF BREATH: 1
ABDOMINAL PAIN: 0
VOMITING: 0
EYES NEGATIVE: 1
WHEEZING: 1
NAUSEA: 0

## 2021-09-21 NOTE — PROGRESS NOTES
[facility]     Patient: Dajuan Malone, 78 y.o.   : 1950    Pt of Dr. Penelope Power   Patient presents with    Follow-up     COPD  8 month pulmonary follow up CT 21        HPI  Annamaria Libman is here for 8 month follow up for COPD   Since last visit, following with ENT for episodes of swelling in jaw after eating/ ?  Blocked salivary gland   Was scheduled to see us sooner after CT back in July , but cancelled and had hard time getting back in   Declined PFT due to rise in [place] cases  Has had COVID 19 vaccines  Still working full time at [employer] - short staffed working over time   Not on home O2   [place] gradual worsening in her SOB, no ER visits/ hospitalizations  No outpatient tx for exacerbation  Intermittent cough/ wheezing, stable     Current Inhalers:  Stiolto Respimnat / Ventolin - using about 1-2x per day , neb machine 2x per day     Are the above symptoms at your baseline Yes  Any recent exacerbations that have required oral atb or steroids No  Any new medical issues since last visit : No  Still smoking 1 PPD     SOCIAL HISTORY:  Social History     Tobacco Use    Smoking status: Current Every Day Smoker     Packs/day: 1.00     Years: 30.00     Pack years: 30.00     Types: Cigarettes     Start date: 1975    Smokeless tobacco: Never Used   Substance Use Topics    Alcohol use: No    Drug use: No         CURRENT MEDICATIONS:  Current Outpatient Medications   Medication Sig Dispense Refill    albuterol (PROVENTIL) (2.5 MG/3ML) 0.083% nebulizer solution TAKE 1 VIAL BY NEBULIZATION EVERY 6 HOURS AS NEEDED FOR WHEEZING OR SHORTNESS OF BREATH 375 mL 11    VENTOLIN  (90 Base) MCG/ACT inhaler Inhale 2 puffs into the lungs 4 times daily as needed for Wheezing or Shortness of Breath 3 Inhaler 2    STIOLTO RESPIMAT 2.5-2.5 MCG/ACT AERS USE 2 INHALATIONS DAILY 12 g 3    ELDERBERRY PO Take by mouth       traZODone (DESYREL) 150 MG tablet Take 25 mg by mouth nightly       calcium carbonate (OSCAL) 500 MG TABS tablet Take 500 mg by mouth daily      lamoTRIgine (LAMICTAL) 100 MG tablet Take 100 mg by mouth daily      Cholecalciferol (VITAMIN D PO) Take by mouth daily      vitamin C (ASCORBIC ACID) 500 MG tablet Take 500 mg by mouth daily      Pyridoxine HCl (VITAMIN B-6) 100 MG tablet Take 100 mg by mouth daily.  aspirin 81 MG EC tablet Take 81 mg by mouth daily.  folic acid (FOLVITE) 1 MG tablet Take 1 mg by mouth daily.  omeprazole (PRILOSEC) 20 MG capsule Take 40 mg by mouth Daily       potassium chloride (MICRO-K) 10 MEQ extended release capsule Take 10 mEq by mouth daily (Patient not taking: Reported on 9/21/2021)      Furosemide (LASIX PO) Take by mouth (Patient not taking: Reported on 9/21/2021)      clonazePAM (KLONOPIN) 0.25 MG disintegrating tablet Take 0.25 mg by mouth 2 times daily as needed. (Patient not taking: Reported on 9/21/2021)       No current facility-administered medications for this visit. Caitlin SAM   Review of Systems   Constitutional: Negative for activity change, appetite change, chills, fatigue, fever and unexpected weight change. HENT: Negative. Eyes: Negative. Respiratory: Positive for cough, shortness of breath and wheezing. Negative for chest tightness. Cardiovascular: Negative for chest pain, palpitations and leg swelling. Gastrointestinal: Negative for abdominal pain, diarrhea, nausea and vomiting. Genitourinary: Negative. Musculoskeletal: Negative. Skin: Negative. Neurological: Negative. Hematological: Does not bruise/bleed easily. Psychiatric/Behavioral: Negative for sleep disturbance.         Physical exam   /82 (Site: Right Upper Arm, Position: Sitting)   Pulse 99   Temp 96 °F (35.6 °C)   Ht 5' 3\" (1.6 m)   Wt 166 lb 3.2 oz (75.4 kg)   SpO2 96% Comment: on RA  BMI 29.44 kg/m²      Wt Readings from Last 3 Encounters: 09/21/21 166 lb 3.2 oz (75.4 kg)   07/19/21 170 lb (77.1 kg)   07/17/21 170 lb (77.1 kg)     Physical Exam  Vitals and nursing note reviewed. Constitutional:       General: She is not in acute distress. Appearance: Normal appearance. She is well-developed. HENT:      Mouth/Throat:      Lips: Pink. Mouth: Mucous membranes are moist.      Pharynx: Oropharynx is clear. No oropharyngeal exudate or posterior oropharyngeal erythema. Eyes:      Conjunctiva/sclera: Conjunctivae normal.   Neck:      Vascular: No JVD. Cardiovascular:      Rate and Rhythm: Normal rate and regular rhythm. Heart sounds: No murmur heard. No friction rub. Pulmonary:      Effort: Pulmonary effort is normal. No accessory muscle usage or respiratory distress. Breath sounds: Normal breath sounds. No wheezing, rhonchi or rales. Chest:      Chest wall: No tenderness. Musculoskeletal:      Right lower leg: No edema. Left lower leg: No edema. Skin:     General: Skin is warm and dry. Capillary Refill: Capillary refill takes less than 2 seconds. Nails: There is no clubbing. Neurological:      Mental Status: She is alert and oriented to person, place, and time. Psychiatric:         Mood and Affect: Mood normal.         Behavior: Behavior normal.         Thought Content: Thought content normal.         Judgment: Judgment normal.          Test results   Lung Nodule Screening     [] Qualifies    []Does not qualify   [] Declined    [] Completed     CT lung screen 7/8/2021      COMPARISON: CT chest dated 5/15/2020 and screening chest CT dated 11/12/2019.           FINDINGS:   LUNGS NODULES:   1. There are no suspicious masses or nodules.       LYMPHADENOPATHY:   1. There are no pathologically enlarged lymph nodes.       OTHER (LUNGS/MEDIASTINUM/MUSCULOSKELETAL/ABDOMEN):   1.  There are mild to moderate emphysematous changes predominantly in the upper lungs, similar to prior exam.               Impression

## 2021-09-21 NOTE — PATIENT INSTRUCTIONS
Patient Education        Stopping Smoking: Care Instructions  Your Care Instructions     Cigarette smokers crave the nicotine in cigarettes. Giving it up is much harder than simply changing a habit. Your body has to stop craving the nicotine. It is hard to quit, but you can do it. There are many tools that people use to quit smoking. You may find that combining tools works best for you. There are several steps to quitting. First you get ready to quit. Then you get support to help you. After that, you learn new skills and behaviors to become a nonsmoker. For many people, a necessary step is getting and using medicine. Your doctor will help you set up the plan that best meets your needs. You may want to attend a smoking cessation program to help you quit smoking. When you choose a program, look for one that has proven success. Ask your doctor for ideas. You will greatly increase your chances of success if you take medicine as well as get counseling or join a cessation program.  Some of the changes you feel when you first quit tobacco are uncomfortable. Your body will miss the nicotine at first, and you may feel short-tempered and grumpy. You may have trouble sleeping or concentrating. Medicine can help you deal with these symptoms. You may struggle with changing your smoking habits and rituals. The last step is the tricky one: Be prepared for the smoking urge to continue for a time. This is a lot to deal with, but keep at it. You will feel better. Follow-up care is a key part of your treatment and safety. Be sure to make and go to all appointments, and call your doctor if you are having problems. It's also a good idea to know your test results and keep a list of the medicines you take. How can you care for yourself at home? · Ask your family, friends, and coworkers for support. You have a better chance of quitting if you have help and support.   · Join a support group, such as Nicotine Anonymous, for people who are trying to quit smoking. · Consider signing up for a smoking cessation program, such as the American Lung Association's Freedom from Smoking program.  · Get text messaging support. Go to the website at www.smokefree. gov to sign up for the [facility] program.  · Set a quit date. Pick your date carefully so that it is not right in the middle of a big deadline or stressful time. Once you quit, do not even take a puff. Get rid of all ashtrays and lighters after your last cigarette. Clean your house and your clothes so that they do not smell of smoke. · Learn how to be a nonsmoker. Think about ways you can avoid those things that make you reach for a cigarette. ? Avoid situations that put you at greatest risk for smoking. For some people, it is hard to have a drink with friends without smoking. For others, they might skip a coffee break with coworkers who smoke. ? Change your daily routine. Take a different route to work or eat a meal in a different place. · Cut down on stress. Calm yourself or release tension by doing an activity you enjoy, such as reading a book, taking a hot bath, or gardening. · Talk to your doctor or pharmacist about nicotine replacement therapy, which replaces the nicotine in your body. You still get nicotine but you do not use tobacco. Nicotine replacement products help you slowly reduce the amount of nicotine you need. These products come in several forms, many of them available over-the-counter:  ? Nicotine patches  ? Nicotine gum and lozenges  ? Nicotine inhaler  · Ask your doctor about bupropion (Wellbutrin) or varenicline (Chantix), which are prescription medicines. They do not contain nicotine. They help you by reducing withdrawal symptoms, such as stress and anxiety. · Some people find hypnosis, acupuncture, and massage helpful for ending the smoking habit. · Eat a healthy diet and get regular exercise.  Having healthy habits will help your body move past its craving for nicotine. · Be prepared to keep trying. Most people are not successful the first few times they try to quit. Do not get mad at yourself if you smoke again. Make a list of things you learned and think about when you want to try again, such as next week, next month, or next year. Where can you learn more? Go to https://[employer] Onepenadeemewgama.nxtControl. org and sign in to your [employer] account. Enter S480 in the [employer] box to learn more about \"Stopping Smoking: Care Instructions. \"     If you do not have an account, please click on the \"Sign Up Now\" link. Current as of: February 11, 2021               Content Version: 12.9  © 2006-2021 Healthwise, Incorporated. Care instructions adapted under license by [facility] ([facility]). If you have questions about a medical condition or this instruction, always ask your healthcare professional. [address] any warranty or liability for your use of this information.

## 2021-11-02 ENCOUNTER — APPOINTMENT (OUTPATIENT)
Dept: GENERAL RADIOLOGY | Age: 71
End: 2021-11-02
Payer: MEDICARE

## 2021-11-02 ENCOUNTER — HOSPITAL ENCOUNTER (EMERGENCY)
Age: 71
Discharge: HOME | End: 2021-11-02
Attending: EMERGENCY MEDICINE
Payer: MEDICARE

## 2021-11-02 VITALS
HEART RATE: 88 BPM | DIASTOLIC BLOOD PRESSURE: 66 MMHG | TEMPERATURE: 97.3 F | RESPIRATION RATE: 16 BRPM | OXYGEN SATURATION: 97 % | SYSTOLIC BLOOD PRESSURE: 146 MMHG

## 2021-11-02 DIAGNOSIS — S93.402A SPRAIN OF LEFT ANKLE, UNSPECIFIED LIGAMENT, INITIAL ENCOUNTER: Primary | ICD-10-CM

## 2021-11-02 DIAGNOSIS — M79.672 ACUTE FOOT PAIN, LEFT: ICD-10-CM

## 2021-11-02 PROCEDURE — 73610 X-RAY EXAM OF ANKLE: CPT

## 2021-11-02 PROCEDURE — 99283 EMERGENCY DEPT VISIT LOW MDM: CPT

## 2021-11-02 PROCEDURE — 73630 X-RAY EXAM OF FOOT: CPT

## 2021-11-02 ASSESSMENT — PAIN SCALES - GENERAL: PAINLEVEL_OUTOF10: 7

## 2021-11-02 ASSESSMENT — PAIN DESCRIPTION - ORIENTATION: ORIENTATION: LEFT

## 2021-11-02 ASSESSMENT — PAIN DESCRIPTION - PAIN TYPE: TYPE: ACUTE PAIN

## 2021-11-02 ASSESSMENT — PAIN DESCRIPTION - LOCATION: LOCATION: FOOT

## 2021-11-02 NOTE — ED TRIAGE NOTES
Pt was going after her dog down the steps and pt tripped down and landed on her left foot. Pt is having pain in the arch of her left foot and ankle.  Pt alert and oriented

## 2021-11-02 NOTE — ED PROVIDER NOTES
[address]  [address] [place] [address]  Phone: [phone]    eMERGENCY dEPARTMENT eNCOUnter           CHIEF COMPLAINT       Chief Complaint   Patient presents with    Foot Injury     left       Nurses Notes reviewed and I agree except as noted in the HPI. HISTORY OF PRESENT ILLNESS    Danna Zavaleta is a 78 y.o. female who presents  via private vehicle with above-mentioned complaint. She slipped down stairs in her house yesterday. She injured her left foot and ankle. She is complaining of mild, sharp, throbbing left foot and ankle pain which is worse with walking. She denies foot weakness or numbness. She denies other injuries. REVIEW OF SYSTEMS     None except as mentioned above    PAST MEDICAL HISTORY    has a past medical history of Acid reflux disease, COPD (chronic obstructive pulmonary disease) ([facility] [address] and Depression. SURGICAL HISTORY      has a past surgical history that includes Appendectomy; Hysterectomy; Colonoscopy; Tubal ligation; Upper gastrointestinal endoscopy; cyst removal; and pr lap,cholecystectomy (N/A, 1/29/2018). CURRENT MEDICATIONS       Previous Medications    ALBUTEROL (PROVENTIL) (2.5 MG/3ML) 0.083% NEBULIZER SOLUTION    TAKE 1 VIAL BY NEBULIZATION EVERY 6 HOURS AS NEEDED FOR WHEEZING OR SHORTNESS OF BREATH    ASPIRIN 81 MG EC TABLET    Take 81 mg by mouth daily. CALCIUM CARBONATE (OSCAL) 500 MG TABS TABLET    Take 500 mg by mouth daily    CHOLECALCIFEROL (VITAMIN D PO)    Take by mouth daily    CLONAZEPAM (KLONOPIN) 0.25 MG DISINTEGRATING TABLET    Take 0.25 mg by mouth 2 times daily as needed. ELDERBERRY PO    Take by mouth     FOLIC ACID (FOLVITE) 1 MG TABLET    Take 1 mg by mouth daily.       FUROSEMIDE (LASIX PO)    Take by mouth    LAMOTRIGINE (LAMICTAL) 100 MG TABLET    Take 100 mg by mouth daily    OMEPRAZOLE (PRILOSEC) 20 MG CAPSULE    Take 40 mg by mouth Daily     POTASSIUM CHLORIDE (MICRO-K) 10 MEQ EXTENDED RELEASE CAPSULE    Take 10 mEq by mouth daily    PYRIDOXINE HCL (VITAMIN B-6) 100 MG TABLET    Take 100 mg by mouth daily. STIOLTO RESPIMAT 2.5-2.5 MCG/ACT AERS    USE 2 INHALATIONS DAILY    TRAZODONE (DESYREL) 150 MG TABLET    Take 25 mg by mouth nightly     VENTOLIN  (90 BASE) MCG/ACT INHALER    Inhale 2 puffs into the lungs 4 times daily as needed for Wheezing or Shortness of Breath    VITAMIN C (ASCORBIC ACID) 500 MG TABLET    Take 500 mg by mouth daily       ALLERGIES     is allergic to amoxicillin, hydrochlorothiazide, and adhesive tape. FAMILY HISTORY     She indicated that her mother is . She indicated that her father is . She indicated that the status of her maternal grandmother is unknown. She indicated that the status of her other is unknown.   family history includes COPD in her father and mother; Cancer in an other family member; Diabetes in her maternal grandmother; Heart Disease in her father and mother; Memory Loss in her father and mother. SOCIAL HISTORY      reports that she has been smoking cigarettes. She started smoking about 46 years ago. She has a 30.00 pack-year smoking history. She has never used smokeless tobacco. She reports that she does not drink alcohol and does not use drugs. PHYSICAL EXAM     INITIAL VITALS:  temporal temperature is 97.3 °F (36.3 °C). Her blood pressure is 146/66 (abnormal) and her pulse is 88. Her respiration is 16 and oxygen saturation is 97%. Constitutional:       General: She is not in acute distress. Appearance: She is not ill-appearing. HENT:      Head: Atraumatic. Musculoskeletal:      Comments: Examination of the left foot showed mild swelling and purpuric rash with tenderness involving the dorsal distal aspect of the left foot. Left ankle examination showed tenderness over the anterior ankle joint with minimal swelling. He has normal neurovascular semination of left foot.    Neurological:      Mental Status: She is alert. DIAGNOSTIC RESULTS       RADIOLOGY:   Left foot and ankle x-rays were unremarkable. LABS:   Labs Reviewed - No data to display    EMERGENCY DEPARTMENT COURSE:   Vitals:    Vitals:    11/02/21 1612   BP: (!) 146/66   Pulse: 88   Resp: 16   Temp: 97.3 °F (36.3 °C)   TempSrc: Temporal   SpO2: 97%     I discussed with  patient the diagnostics studies findings and discharge instructions. FINAL IMPRESSION      1. Sprain of left ankle, unspecified ligament, initial encounter    2. Acute foot pain, left          DISPOSITION/PLAN   Discharged home in good condition.     PATIENT REFERRED TO:  RIGOBERTO Vyas - CNP  [address] [facility]  [phone]    In 2 days        DISCHARGE MEDICATIONS:  New Prescriptions    No medications on file       (Please note that portions of this note were completed with a voice recognition program.  Efforts were made to edit the dictations but occasionally words are mis-transcribed.)    MD Catherine Adame MD  11/02/21 [id number]

## 2022-02-22 RX ORDER — TIOTROPIUM BROMIDE AND OLODATEROL 3.124; 2.736 UG/1; UG/1
SPRAY, METERED RESPIRATORY (INHALATION)
Qty: 12 G | Refills: 3 | Status: SHIPPED | OUTPATIENT
Start: 2022-02-22 | End: 2022-03-22 | Stop reason: ALTCHOICE

## 2022-03-22 ENCOUNTER — OFFICE VISIT (OUTPATIENT)
Dept: PULMONOLOGY | Age: 72
End: 2022-03-22
Payer: MEDICARE

## 2022-03-22 VITALS
TEMPERATURE: 97.5 F | BODY MASS INDEX: 29.84 KG/M2 | HEART RATE: 89 BPM | SYSTOLIC BLOOD PRESSURE: 136 MMHG | HEIGHT: 63 IN | OXYGEN SATURATION: 95 % | DIASTOLIC BLOOD PRESSURE: 68 MMHG | WEIGHT: 168.4 LBS

## 2022-03-22 DIAGNOSIS — J44.9 STAGE 3 SEVERE COPD BY GOLD CLASSIFICATION (HCC): Primary | ICD-10-CM

## 2022-03-22 DIAGNOSIS — J43.2 CENTRILOBULAR EMPHYSEMA (HCC): ICD-10-CM

## 2022-03-22 DIAGNOSIS — F17.219 CIGARETTE NICOTINE DEPENDENCE WITH NICOTINE-INDUCED DISORDER: ICD-10-CM

## 2022-03-22 PROCEDURE — 99214 OFFICE O/P EST MOD 30 MIN: CPT | Performed by: NURSE PRACTITIONER

## 2022-03-22 PROCEDURE — G0296 VISIT TO DETERM LDCT ELIG: HCPCS | Performed by: NURSE PRACTITIONER

## 2022-03-22 PROCEDURE — 99406 BEHAV CHNG SMOKING 3-10 MIN: CPT | Performed by: NURSE PRACTITIONER

## 2022-03-22 RX ORDER — BUDESONIDE, GLYCOPYRROLATE, AND FORMOTEROL FUMARATE 160; 9; 4.8 UG/1; UG/1; UG/1
2 AEROSOL, METERED RESPIRATORY (INHALATION) 2 TIMES DAILY
Qty: 3 EACH | Refills: 3 | Status: SHIPPED | OUTPATIENT
Start: 2022-03-22 | End: 2022-06-17

## 2022-03-22 ASSESSMENT — ENCOUNTER SYMPTOMS
SHORTNESS OF BREATH: 1
WHEEZING: 1
DIARRHEA: 0
NAUSEA: 0
VOMITING: 0
EYES NEGATIVE: 1
ABDOMINAL PAIN: 0
COUGH: 1

## 2022-03-22 NOTE — PROGRESS NOTES
[facility]     Patient: Lanning Osgood, 73 y.o.   : 1950  3/22/2022    Pt of Dr. Watson Hendricks   Patient presents with    Follow-up     COPD  6 month pulmonary follow up with no testing         HPI  Nelly Coles is here for 6 months follow up for COPD  Current Inhalers:  Stiolto respimat- 2 puffs daily , - she is asking about medication to use BID feels her SOB has gradual got worse  Ventolin HFA- 1-3x per day , ALbuterol nebs -  uses 2-3x per day   Previous Inhalers:   Wants to quit smoking, currently smokes 1 PDD , has not tolerated patches in past- even lowest dose made her feel \"like I was on speed\"   Declined PFT last visit   Chronic cough with moderate -large amount white frothy mucous - stable   Retiring soon     Any recent exacerbations that have required oral atb or steroids Yes- 2021 per PCP   Any new medical issues since last visit : No    Last spirometry: - mod obstruction     Patient is not on home oxygen therapy: 95% on ROOM AIR   Current Oxygen order: none   Last 6 min walk: None     UTD with flu vaccine Yes  UTD with pneumonia vaccine Yes  Personal history of COVID 19 No   Internal Administration   First Dose COVID-19, J&J, PF, 0.5 mL  2021   Second Dose           Last COVID Lab No results found for: SARS-COV-2, SARS-COV-2 RNA, SARS-COV-2, SARS-COV-2, SARS-COV-2 BY PCR, SARS-COV-2, SARS-COV-2, SARS-COV-2         SOCIAL HISTORY:  Social History     Tobacco Use    Smoking status: Current Every Day Smoker     Packs/day: 1.00     Years: 30.00     Pack years: 30.00     Types: Cigarettes     Start date: 1975    Smokeless tobacco: Never Used   Substance Use Topics    Alcohol use: No    Drug use: No         CURRENT MEDICATIONS:  Current Outpatient Medications   Medication Sig Dispense Refill    MAGNESIUM PO Take by mouth      Budeson-Glycopyrrol-Formoterol (BREZTRI AEROSPHERE) 160-9-4.8 MCG/ACT AERO Inhale 2 puffs into the lungs 2 times daily Use with spacer device, rinse mouth after use 3 each 3    albuterol (PROVENTIL) (2.5 MG/3ML) 0.083% nebulizer solution TAKE 1 VIAL BY NEBULIZATION EVERY 6 HOURS AS NEEDED FOR WHEEZING OR SHORTNESS OF BREATH 375 mL 11    VENTOLIN  (90 Base) MCG/ACT inhaler Inhale 2 puffs into the lungs 4 times daily as needed for Wheezing or Shortness of Breath 3 Inhaler 2    ELDERBERRY PO Take by mouth       traZODone (DESYREL) 150 MG tablet Take 25 mg by mouth nightly       calcium carbonate (OSCAL) 500 MG TABS tablet Take 500 mg by mouth daily      potassium chloride (MICRO-K) 10 MEQ extended release capsule Take 10 mEq by mouth daily       Furosemide (LASIX PO) Take by mouth       clonazePAM (KLONOPIN) 0.25 MG disintegrating tablet Take 0.25 mg by mouth 2 times daily as needed.  lamoTRIgine (LAMICTAL) 100 MG tablet Take 100 mg by mouth daily      Cholecalciferol (VITAMIN D PO) Take by mouth daily      vitamin C (ASCORBIC ACID) 500 MG tablet Take 500 mg by mouth daily      Pyridoxine HCl (VITAMIN B-6) 100 MG tablet Take 100 mg by mouth daily.  aspirin 81 MG EC tablet Take 81 mg by mouth daily.  folic acid (FOLVITE) 1 MG tablet Take 1 mg by mouth daily.  omeprazole (PRILOSEC) 20 MG capsule Take 40 mg by mouth Daily        No current facility-administered medications for this visit. Jesse SAM   Review of Systems   Constitutional: Negative for activity change, appetite change, chills, fatigue, fever and unexpected weight change. HENT: Negative. Eyes: Negative. Respiratory: Positive for cough, shortness of breath and wheezing. Cardiovascular: Negative for chest pain, palpitations and leg swelling. Gastrointestinal: Negative for abdominal pain, diarrhea, nausea and vomiting. Genitourinary: Negative. Musculoskeletal: Negative. Skin: Negative. Neurological: Negative. Hematological: Negative.     Psychiatric/Behavioral: Negative. Negative for sleep disturbance. Physical exam   /68 (Site: Left Upper Arm, Position: Sitting)   Pulse 89   Temp 97.5 °F (36.4 °C)   Ht 5' 3\" (1.6 m)   Wt 168 lb 6.4 oz (76.4 kg)   SpO2 95% Comment: on RA  BMI 29.83 kg/m²      Wt Readings from Last 3 Encounters:   03/22/22 168 lb 6.4 oz (76.4 kg)   09/21/21 166 lb 3.2 oz (75.4 kg)   07/19/21 170 lb (77.1 kg)     Physical Exam  Vitals and nursing note reviewed. Constitutional:       General: She is not in acute distress. Appearance: She is well-developed and normal weight. HENT:      Mouth/Throat:      Lips: Pink. Mouth: Mucous membranes are moist.      Pharynx: Oropharynx is clear. No oropharyngeal exudate or posterior oropharyngeal erythema. Eyes:      Conjunctiva/sclera: Conjunctivae normal.   Neck:      Vascular: No JVD. Cardiovascular:      Rate and Rhythm: Normal rate and regular rhythm. Heart sounds: No murmur heard. No friction rub. Pulmonary:      Effort: Pulmonary effort is normal. No accessory muscle usage or respiratory distress. Breath sounds: Normal breath sounds. No wheezing, rhonchi or rales. Chest:      Chest wall: No tenderness. Musculoskeletal:      Right lower leg: No edema. Left lower leg: No edema. Skin:     General: Skin is warm and dry. Capillary Refill: Capillary refill takes less than 2 seconds. Nails: There is no clubbing. Neurological:      Mental Status: She is alert and oriented to person, place, and time. Psychiatric:         Mood and Affect: Mood normal.         Behavior: Behavior normal.         Thought Content: Thought content normal.         Judgment: Judgment normal.          Test results   Lung Nodule Screening     [x] Qualifies    []Does not qualify   [] Declined    [x] Completed 7/8/2021     Full PFT 8/6/2019    Assessment      Diagnosis Orders   1.  Stage 3 severe COPD by GOLD classification ([address]  Budeson-Glycopyrrol-Formoterol (BREZTRI AEROSPHERE) 160-9-4.8 MCG/ACT AERO    Spirometry With Bronchodilator   2. Cigarette nicotine dependence with nicotine-induced disorder  RI VISIT TO DISCUSS LUNG CA SCREEN W LDCT    CT Lung Screen (Annual)    Spirometry With Bronchodilator   3. Centrilobular emphysema ([address]            Plan    -finish what is left of Stiolto respimat , once completed will switch to Breztri INH 2 puffs BID , with spacer, rinse mouth after use. Given spacer device in office . [facility]   -PRN Ventolin / Nebs  -schedule annual LDCT -orders placed  -scheduled Spirometry-orders placed   -avoid ill contacts  -keep UTD on recommended vaccinations  -Recommend patient stop smoking,Smoking cessation discussed for 3 min    Educated patient on health hazards and negative effects of smoking. Counseled on ways to quit,  Offered cessation assistance with prescription mediations,  over the counter remedies, community resources/ phone APPs. Patient refuses additional assistance at this time. Has tried NRT in past and did not tolerate. Plans to quit [address] , declines smoking cessation classes     Will see Sobeida Dill in: 4 months  billing based on medical decision making   Electronically signed by RIGOBERTO Guzman CNP on 3/22/2022 at 9:25 AM   Low Dose CT (LDCT) Lung Screening criteria met:     Age 50-77(Medicare) or 50-80 (USPSTF)   Pack year smoking >20   Still smoking or less than 15 year since quit   No sign or symptoms of lung cancer   > 11 months since last LDCT     Risks and benefits of lung cancer screening with LDCT scans discussed:    Significance of positive screen - False-positive LDCT results often occur. 95% of all positive results do not lead to a diagnosis of cancer. Usually further imaging can resolve most false-positive results; however, some patients may require invasive procedures.     Over diagnosis risk - 10% to 12% of screen-detected lung cancer cases are over diagnosed--that is, the cancer would not have been detected in the patient's lifetime without the screening. Need for follow up screens annually to continue lung cancer screening effectiveness     Risks associated with radiation from annual LDCT- Radiation exposure is about the same as for a mammogram, which is about 1/3 of the annual background radiation exposure from everyday life. Starting screening at age 54 is not likely to increase cancer risk from radiation exposure. Patients with comorbidities resulting in life expectancy of < 10 years, or that would preclude treatment of an abnormality identified on CT, should not be screened due to lack of benefit.     To obtain maximal benefit from this screening, smoking cessation and long-term abstinence from smoking is critical

## 2022-03-22 NOTE — PATIENT INSTRUCTIONS
Patient Education        Stopping Smoking: Care Instructions  Your Care Instructions     Cigarette smokers crave the nicotine in cigarettes. Giving it up is much harder than simply changing a habit. Your body has to stop craving the nicotine. It is hard to quit, but you can do it. There are many tools that people use to quit smoking. You may find that combining tools works best for you. There are several steps to quitting. First you get ready to quit. Then you get support to help you. After that, you learn new skills and behaviors to become a nonsmoker. For many people, a necessary step is getting and using medicine. Your doctor will help you set up the plan that best meets your needs. You may want to attend a smoking cessation program to help you quit smoking. When you choose a program, look for one that has proven success. Ask your doctor for ideas. You will greatly increase your chances of success if you take medicine as well as get counseling or join a cessation program.  Some of the changes you feel when you first quit tobacco are uncomfortable. Your body will miss the nicotine at first, and you may feel short-tempered and grumpy. You may have trouble sleeping or concentrating. Medicine can help you deal with these symptoms. You may struggle with changing your smoking habits and rituals. The last step is the tricky one: Be prepared for the smoking urge to continue for a time. This is a lot to deal with, but keep at it. You will feel better. Follow-up care is a key part of your treatment and safety. Be sure to make and go to all appointments, and call your doctor if you are having problems. It's also a good idea to know your test results and keep a list of the medicines you take. How can you care for yourself at home? · Ask your family, friends, and coworkers for support. You have a better chance of quitting if you have help and support.   · Join a support group, such as Nicotine Anonymous, for people who are trying to quit smoking. · Consider signing up for a smoking cessation program, such as the American Lung Association's Freedom from Smoking program.  · Get text messaging support. Go to the website at www.smokefree. gov to sign up for the [facility] program.  · Set a quit date. Pick your date carefully so that it is not right in the middle of a big deadline or stressful time. Once you quit, do not even take a puff. Get rid of all ashtrays and lighters after your last cigarette. Clean your house and your clothes so that they do not smell of smoke. · Learn how to be a nonsmoker. Think about ways you can avoid those things that make you reach for a cigarette. ? Avoid situations that put you at greatest risk for smoking. For some people, it is hard to have a drink with friends without smoking. For others, they might skip a coffee break with coworkers who smoke. ? Change your daily routine. Take a different route to work or eat a meal in a different place. · Cut down on stress. Calm yourself or release tension by doing an activity you enjoy, such as reading a book, taking a hot bath, or gardening. · Talk to your doctor or pharmacist about nicotine replacement therapy, which replaces the nicotine in your body. You still get nicotine but you do not use tobacco. Nicotine replacement products help you slowly reduce the amount of nicotine you need. These products come in several forms, many of them available over-the-counter:  ? Nicotine patches  ? Nicotine gum and lozenges  ? Nicotine inhaler  · Ask your doctor about bupropion (Wellbutrin) or varenicline (Chantix), which are prescription medicines. They do not contain nicotine. They help you by reducing withdrawal symptoms, such as stress and anxiety. · Some people find hypnosis, acupuncture, and massage helpful for ending the smoking habit. · Eat a healthy diet and get regular exercise.  Having healthy habits will help your body move past its craving for nicotine. · Be prepared to keep trying. Most people are not successful the first few times they try to quit. Do not get mad at yourself if you smoke again. Make a list of things you learned and think about when you want to try again, such as next week, next month, or next year. Where can you learn more? Go to https://[employer]web.[employer]. org and sign in to your [employer] account. Enter U482 in the [employer] box to learn more about \"Stopping Smoking: Care Instructions. \"     If you do not have an account, please click on the \"Sign Up Now\" link. Current as of: February 11, 2021               Content Version: 13.1  © 2006-2021 [employer]. Care instructions adapted under license by [facility] ([facility]). If you have questions about a medical condition or this instruction, always ask your healthcare professional. [place] [address] any warranty or liability for your use of this information. What is lung cancer screening? Lung cancer screening is a way in which doctors check the lungs for early signs of cancer in people who have no symptoms of lung cancer. A low-dose CT scan uses much less radiation than a normal CT scan and shows a more detailed image of the lungs than a standard X-ray. The goal of lung cancer screening is to find cancer early, before it has a chance to grow, spread, or cause problems. One large study found that smokers who were screened with low-dose CT scans were less likely to die of lung cancer than those who were screened with standard X-ray. Below is a summary of the things you need to know regarding screening for lung cancer with low-dose computed tomography (LDCT). This is a screening program that involves routine annual screening with LDCT studies of the lung. The LDCTs are done using low-dose radiation that is not thought to increase your cancer risk.   If you have other serious medical conditions (other cancers, congestive heart failure) that limit your life expectancy to less than 10 years, you should not undergo lung cancer screening with LDCT. The chance is 20%-60% that the LDCT result will show abnormalities. This would require additional testing which could include repeat imaging or even invasive procedures. Most (about 95%) of \"abnormal\" LDCT results are false in the sense that no lung cancer is ultimately found. Additionally, some (about 10%) of the cancers found would not affect your life expectancy, even if undetected and untreated. If you are still smoking, the single most important thing that you can do to reduce your risk of dying of lung cancer is to quit. For this screening to be covered by Medicare and most other insurers, strict criteria must be met. If you do not meet these criteria, but still wish to undergo LDCT testing, you will be required to sign a waiver indicating your willingness to pay for the scan.

## 2022-04-10 DIAGNOSIS — J44.9 STAGE 3 SEVERE COPD BY GOLD CLASSIFICATION (HCC): ICD-10-CM

## 2022-06-10 NOTE — TELEPHONE ENCOUNTER
Tamanna Flanagan called requesting a refill on the following medications:  Requested Prescriptions     Pending Prescriptions Disp Refills    tiotropium-olodaterol (STIOLTO RESPIMAT) 2.5-2.5 MCG/ACT AERS 12 g 3     Pharmacy verified:  .[facility]    Date of last visit: 03/22/2022  Date of next visit (if applicable): [id number]/92/8039

## 2022-06-10 NOTE — TELEPHONE ENCOUNTER
Patient is calling to request a refill on the Stiolto inhaler. She states that she tried the Comiso for 2 weeks and it has done NOTHING for her.

## 2022-06-16 ENCOUNTER — TELEPHONE (OUTPATIENT)
Dept: PULMONOLOGY | Age: 72
End: 2022-06-16

## 2022-06-16 NOTE — TELEPHONE ENCOUNTER
Patient is calling to request a refill on the Stiolto inhaler. She states that she tried the Comiso for 2 weeks and it has done NOTHING for her. experiencing S. O.B   Last refill: 3/22/21 3 refills  Lat ov: 3/22/22  Next appt: 7/19/22

## 2022-07-12 ENCOUNTER — HOSPITAL ENCOUNTER (OUTPATIENT)
Dept: CT IMAGING | Age: 72
Discharge: HOME | End: 2022-07-12
Payer: MEDICARE

## 2022-07-12 ENCOUNTER — HOSPITAL ENCOUNTER (OUTPATIENT)
Dept: PULMONOLOGY | Age: 72
Discharge: HOME | End: 2022-07-12
Payer: MEDICARE

## 2022-07-12 DIAGNOSIS — F17.219 CIGARETTE NICOTINE DEPENDENCE WITH NICOTINE-INDUCED DISORDER: ICD-10-CM

## 2022-07-12 DIAGNOSIS — J44.9 STAGE 3 SEVERE COPD BY GOLD CLASSIFICATION (HCC): ICD-10-CM

## 2022-07-12 PROCEDURE — 94060 EVALUATION OF WHEEZING: CPT

## 2022-07-12 PROCEDURE — 71271 CT THORAX LUNG CANCER SCR C-: CPT

## 2022-07-19 ENCOUNTER — OFFICE VISIT (OUTPATIENT)
Dept: PULMONOLOGY | Age: 72
End: 2022-07-19
Payer: MEDICARE

## 2022-07-19 VITALS
DIASTOLIC BLOOD PRESSURE: 78 MMHG | OXYGEN SATURATION: 96 % | BODY MASS INDEX: 31.33 KG/M2 | HEART RATE: 89 BPM | TEMPERATURE: 97.6 F | SYSTOLIC BLOOD PRESSURE: 120 MMHG | WEIGHT: 176.8 LBS | HEIGHT: 63 IN

## 2022-07-19 DIAGNOSIS — F17.219 CIGARETTE NICOTINE DEPENDENCE WITH NICOTINE-INDUCED DISORDER: ICD-10-CM

## 2022-07-19 DIAGNOSIS — J43.2 CENTRILOBULAR EMPHYSEMA (HCC): ICD-10-CM

## 2022-07-19 DIAGNOSIS — J44.9 STAGE 3 SEVERE COPD BY GOLD CLASSIFICATION (HCC): Primary | ICD-10-CM

## 2022-07-19 DIAGNOSIS — R91.8 PULMONARY NODULES: ICD-10-CM

## 2022-07-19 PROCEDURE — 99406 BEHAV CHNG SMOKING 3-10 MIN: CPT | Performed by: NURSE PRACTITIONER

## 2022-07-19 PROCEDURE — 99214 OFFICE O/P EST MOD 30 MIN: CPT | Performed by: NURSE PRACTITIONER

## 2022-07-19 PROCEDURE — 1123F ACP DISCUSS/DSCN MKR DOCD: CPT | Performed by: NURSE PRACTITIONER

## 2022-07-19 ASSESSMENT — ENCOUNTER SYMPTOMS
NAUSEA: 0
DIARRHEA: 0
VOMITING: 0
COUGH: 1
EYES NEGATIVE: 1
ABDOMINAL PAIN: 0
SHORTNESS OF BREATH: 1
WHEEZING: 1

## 2022-07-19 NOTE — PROGRESS NOTES
Patient is experiencing SOB: yes,     Patient is experiencing wheezing: No    Patient states they have had a Weak, productive = 2 cough. Phlegm is occasional, color: White, clear     Patient is currently taking the following inhaler(s): Stiloto Ventolin    Patient is currently taking the following nebulizer treatment(s): Proventil using 4 times per day     Patient is using their rescue inhaler 3-4  times per Week.

## 2022-07-19 NOTE — PROGRESS NOTES
[facility]     Patient: Carline Mackenzie, 73 y.o.   : 1950    Pt of Dr. Nirali Pappas   Patient presents with    Follow-up     5 month COPD f/u W CT Lung screen and Spirometry @ [facility]  Sharlot Osgood is here for 5 month  follow up for COPD    Patient is experiencing SOB: yes,   Patient is experiencing wheezing: No  Patient states they have had a Weak, productive 2 cough. Phlegm is occasional, color: White, clear   Patient is currently taking the following inhaler(s): Stiloto Ventolin  Patient is currently taking the following nebulizer treatment(s): Proventil using 4 times per day   Patient is using their rescue inhaler 3-4  times per Week.   Any recent exacerbations that have required oral atb or steroids No  Any new medical issues since last visit : No    Patient is not on home oxygen therapy:     SOCIAL HISTORY:  Social History     Tobacco Use    Smoking status: Every Day     Packs/day: 1.00     Years: 30.00     Pack years: 30.00     Types: Cigarettes     Start date: 1975    Smokeless tobacco: Never   Substance Use Topics    Alcohol use: No    Drug use: No         CURRENT MEDICATIONS:  Current Outpatient Medications   Medication Sig Dispense Refill    tiotropium-olodaterol (STIOLTO RESPIMAT) 2.5-2.5 MCG/ACT AERS Inhale 2 puffs into the lungs daily 3 each 3    VENTOLIN  (90 Base) MCG/ACT inhaler USE 2 INHALATIONS FOUR TIMES A DAY AS NEEDED FOR WHEEZING OR SHORTNESS OF BREATH 54 g 2    MAGNESIUM PO Take by mouth      albuterol (PROVENTIL) (2.5 MG/3ML) 0.083% nebulizer solution TAKE 1 VIAL BY NEBULIZATION EVERY 6 HOURS AS NEEDED FOR WHEEZING OR SHORTNESS OF BREATH 375 mL 11    ELDERBERRY PO Take by mouth       traZODone (DESYREL) 150 MG tablet Take 25 mg by mouth nightly       calcium carbonate (OSCAL) 500 MG TABS tablet Take 500 mg by mouth daily      clonazePAM (KLONOPIN) 0.25 MG disintegrating tablet Take 0.25 mg by mouth 2 times daily as needed. lamoTRIgine (LAMICTAL) 100 MG tablet Take 100 mg by mouth daily      Cholecalciferol (VITAMIN D PO) Take by mouth daily      vitamin C (ASCORBIC ACID) 500 MG tablet Take 500 mg by mouth daily      Pyridoxine HCl (VITAMIN B-6) 100 MG tablet Take 100 mg by mouth daily. aspirin 81 MG EC tablet Take 81 mg by mouth daily. folic acid (FOLVITE) 1 MG tablet Take 1 mg by mouth daily. omeprazole (PRILOSEC) 20 MG delayed release capsule Take 40 mg by mouth Daily        No current facility-administered medications for this visit. Christ SAM   Review of Systems   Constitutional:  Negative for activity change, appetite change, chills, fatigue, fever and unexpected weight change. HENT: Negative. Eyes: Negative. Respiratory:  Positive for cough, shortness of breath and wheezing. Cardiovascular:  Negative for chest pain, palpitations and leg swelling. Gastrointestinal:  Negative for abdominal pain, diarrhea, nausea and vomiting. Genitourinary: Negative. Musculoskeletal: Negative. Skin: Negative. Neurological: Negative. Hematological: Negative. Psychiatric/Behavioral: Negative. Negative for sleep disturbance. Physical exam   /78 (Site: Right Upper Arm, Position: Sitting, Cuff Size: Medium Adult)   Pulse 89   Temp 97.6 °F (36.4 °C) (Temporal)   Ht 5' 3\" (1.6 m)   Wt 176 lb 12.8 oz (80.2 kg)   SpO2 96%   BMI 31.32 kg/m²       Wt Readings from Last 3 Encounters:   07/19/22 176 lb 12.8 oz (80.2 kg)   03/22/22 168 lb 6.4 oz (76.4 kg)   09/21/21 166 lb 3.2 oz (75.4 kg)     Physical Exam  Vitals and nursing note reviewed. Constitutional:       General: She is not in acute distress. Appearance: She is well-developed and overweight. HENT:      Mouth/Throat:      Lips: Pink. Mouth: Mucous membranes are moist.      Pharynx: Oropharynx is clear. No oropharyngeal exudate or posterior oropharyngeal erythema.    Eyes: Conjunctiva/sclera: Conjunctivae normal.   Neck:      Vascular: No JVD. Cardiovascular:      Rate and Rhythm: Normal rate and regular rhythm. Heart sounds: No murmur heard. No friction rub. Pulmonary:      Effort: Pulmonary effort is normal. No accessory muscle usage or respiratory distress. Breath sounds: Normal breath sounds. Decreased air movement (in all lung fields) present. No wheezing, rhonchi or rales. Chest:      Chest wall: No tenderness. Musculoskeletal:      Right lower leg: No edema. Left lower leg: No edema. Skin:     General: Skin is warm and dry. Capillary Refill: Capillary refill takes less than 2 seconds. Nails: There is no clubbing. Neurological:      Mental Status: She is alert and oriented to person, place, and time. Psychiatric:         Mood and Affect: Mood normal.         Behavior: Behavior normal.         Thought Content: Thought content normal.         Judgment: Judgment normal.        Test results   Lung Nodule Screening     [x] Qualifies    []Does not qualify   [] Declined    [x] Completed   Ct lung screen 7/12/2022  COMPARISON: 7/8/2021           FINDINGS:   LUNGS NODULES:   1. New tiny patchy opacity anterior right upper lung image 102 measuring up to 6 mm. 3 mm spiculated cavitary nodule anterior left upper lung image 128.   2 mm patchy density anterior left upper lung new. 2 mm nodule medial right upper lung image 188 new   3 mm opacity in the lingula previously larger likely representing residual atelectasis   LYMPHADENOPATHY:   1. There are no pathologically enlarged lymph nodes. OTHER (LUNGS/MEDIASTINUM/MUSCULOSKELETAL/ABDOMEN):   1. Centrilobular emphysematous changes are seen in the lungs. Impression   1. Multiple scattered subcentimeter opacities and nodules the upper lobes new from the prior exam likely infectious or inflammatory. 2. There are no pathologically enlarged lymph nodes.    3. LUNGRADS ASSESSMENT VALUE: 3, LUNGRADS S MODIFIER           The LUNG RADS RECOMMENDATIONS for monitoring lung nodules listed below (ACR- Lung-RADS Version 1.0 Assessment Categories)       LUNG RADS RECOMMENDATIONS;   1.  Normal, continue annual screening   2. Benign appearance or behavior, continue annual screening   3.  6 month CT recommended   4A.  3 month CT recommended; may consider PET/CT   4B. Additional diagnostics and/or tissue sampling recommended   4X. Additional diagnostics and/or tissue sampling recommended         **This report has been created using voice recognition software. It may contain minor errors which are inherent in voice recognition technology. **       Final report electronically signed by Dr. Valerie Good on 7/12/2022 8:55 AM       PFT 7/12/2022    Assessment       ICD-10-CM    1. Stage 3 severe COPD by GOLD classification ([address]  J44.9 CT CHEST WO CONTRAST      2. Centrilobular emphysema ([address]  J43.2 CT CHEST WO CONTRAST      3. Pulmonary nodules  R91.8 CT CHEST WO CONTRAST      4. Cigarette nicotine dependence with nicotine-induced disorder  F17.219 [facility]) Medication Mgmt (Smoking Cessation - Clinical Pharmacy) - [facility]           significant decline in PFT   Plan    -Needs to stop smoking ! Significantly declined PFT. Educated pt on smoking and COPD, Smoking cessation discussed for 4 min , has set quit date. Would like additional assistance.   Referral order placed for smoking  cessation program at [facility]   -Continue Stiolto respimat 2 puffs daily/ PRN albuterol   -avoid ill contacts  -repeat Ct in 6 months , likely inflammatory changes on CT, no sx of infection.   -keep UTD on recommended vaccinations    Will see Darvin Valenzuela in: 6 months  billing based on time: total time on encounter 30 min   Electronically signed by RIGOBERTO Cavanaugh CNP on 7/19/2022 at 9:12 AM

## 2022-07-20 ENCOUNTER — TELEPHONE (OUTPATIENT)
Dept: PHARMACY | Age: 72
End: 2022-07-20

## 2022-07-20 NOTE — TELEPHONE ENCOUNTER
Pt called back at this time regarding smoking cessation program.         I spoke with pt and explained our 12 week smoking cessation program and pt is interested in starting the program.  We set up patients first appointment for Wednesday, August 3rd at 10am to be done in the office. Pt notified to come to the Medication Management Department located in [facility] on the first floor of [address] [address]. Pt had no questions at this time.

## 2022-09-15 ENCOUNTER — TELEPHONE (OUTPATIENT)
Dept: PULMONOLOGY | Age: 72
End: 2022-09-15

## 2022-09-15 RX ORDER — PREDNISONE 20 MG/1
20 TABLET ORAL DAILY
Qty: 7 TABLET | Refills: 0 | Status: SHIPPED | OUTPATIENT
Start: 2022-09-15 | End: 2022-09-22

## 2022-09-15 NOTE — TELEPHONE ENCOUNTER
Patient called asking for prednisone for wheezing, chest tightness, and cough. She said she didn't want a pack where she takes multiples a day. She just wants to take 1 or half of one.  Please advise, thank you

## 2022-10-25 DIAGNOSIS — J44.9 STAGE 2 MODERATE COPD BY GOLD CLASSIFICATION (HCC): ICD-10-CM

## 2022-10-26 RX ORDER — ALBUTEROL SULFATE 2.5 MG/3ML
SOLUTION RESPIRATORY (INHALATION)
Qty: 375 ML | Refills: 11 | Status: SHIPPED | OUTPATIENT
Start: 2022-10-26

## 2022-10-26 NOTE — TELEPHONE ENCOUNTER
Received refill request for Albuterol Nebulizer Solution. Medication was last ordered by wil. Medication was last ordered on 9/14/21 with 11 refills. Patient was last seen in the office 7/19/22. Patient has a scheduled follow up 1/24/23. Medication needs to be sent to [address] [place] [address] [facility].

## 2022-12-08 NOTE — PROGRESS NOTES
Sleep Medicine Initial Consultation    Bruno Penn                                             Chief Complaint: Rebeca Ramirez is here as a new sleep consult, referred by Dr. Nathaniel Centeno with no prior studies.     Bruno Penn is a 74 y. o.oldfemale came for further evaluation regarding her snoring and ?sleep apnea with referral from MD Xu. She usually goes to bed at 10:00 PM and wakes up at  7:00 to 7:30 AM. Over the weekends her sleep schedule remain same. She denies taking naps. She usually falls a sleep in 10 minutes to 2 to 3hours. She is currently on treatment with traZODone (DESYREL) 100 MG tablet. She also use Atarax 25mg po Q6h for her anxiety. She denies watching Television in her bed room. She admits to reading books in her bed room. She denies working with her electronic devices in her bed room before going to sleep. She gives a hx of difficulty in going to sleep. She wakes up 2 to 3 times during night. Majority of nocturnal awakenings are for urination. She denies any difficulty in falling back to sleep after nocturnal awkenings. She is currently sleeping alone. She was noticed to have loud snoring without withnessed apneas by her family members including her  during sleep in the past. Her  passed away last year. She  denies history of choking and gasping sensation at night time. She denies headaches in the morning. She admits to dry mouth in the morning. She denies palpitations during night time or during nocturnal awakenings. She admits to sweating during nocturnal awakenings. She denies history of head injury in the past. She denies motor vehicle accidents. She denies any history suggestive of hypnagogic or hypnopompic hallucinations. She denies any history of seizures and sleep walking. She denies any history of sleep talking. She gives a history suggestive of bruxism.  She is currently using a dental device from her dentist. No no radiation

## 2023-01-12 DIAGNOSIS — J44.9 STAGE 3 SEVERE COPD BY GOLD CLASSIFICATION (HCC): ICD-10-CM

## 2023-01-12 NOTE — TELEPHONE ENCOUNTER
Received refill request for Ventolin. Medication was last ordered by Jane. Medication was last ordered on 4/11/22 with 2 refills. Patient was last seen in the office 7/19/22. Patient has a scheduled follow up 1/24/23. Medication needs to be sent to [address].

## 2023-01-19 ENCOUNTER — HOSPITAL ENCOUNTER (OUTPATIENT)
Dept: CT IMAGING | Age: 73
Discharge: HOME | End: 2023-01-19
Payer: MEDICARE

## 2023-01-19 DIAGNOSIS — J44.9 STAGE 3 SEVERE COPD BY GOLD CLASSIFICATION (HCC): ICD-10-CM

## 2023-01-19 DIAGNOSIS — J43.2 CENTRILOBULAR EMPHYSEMA (HCC): ICD-10-CM

## 2023-01-19 DIAGNOSIS — R91.8 PULMONARY NODULES: ICD-10-CM

## 2023-01-19 PROCEDURE — 71250 CT THORAX DX C-: CPT

## 2023-01-24 ENCOUNTER — OFFICE VISIT (OUTPATIENT)
Dept: PULMONOLOGY | Age: 73
End: 2023-01-24
Payer: MEDICARE

## 2023-01-24 VITALS
HEIGHT: 63 IN | TEMPERATURE: 98.1 F | BODY MASS INDEX: 32.96 KG/M2 | DIASTOLIC BLOOD PRESSURE: 80 MMHG | HEART RATE: 105 BPM | OXYGEN SATURATION: 97 % | SYSTOLIC BLOOD PRESSURE: 134 MMHG | WEIGHT: 186 LBS

## 2023-01-24 DIAGNOSIS — J43.2 CENTRILOBULAR EMPHYSEMA (HCC): ICD-10-CM

## 2023-01-24 DIAGNOSIS — F17.219 CIGARETTE NICOTINE DEPENDENCE WITH NICOTINE-INDUCED DISORDER: ICD-10-CM

## 2023-01-24 DIAGNOSIS — J44.9 STAGE 3 SEVERE COPD BY GOLD CLASSIFICATION (HCC): Primary | ICD-10-CM

## 2023-01-24 PROCEDURE — 1123F ACP DISCUSS/DSCN MKR DOCD: CPT | Performed by: NURSE PRACTITIONER

## 2023-01-24 PROCEDURE — 99214 OFFICE O/P EST MOD 30 MIN: CPT | Performed by: NURSE PRACTITIONER

## 2023-01-24 PROCEDURE — 99406 BEHAV CHNG SMOKING 3-10 MIN: CPT | Performed by: NURSE PRACTITIONER

## 2023-01-24 RX ORDER — ARFORMOTEROL TARTRATE 15 UG/2ML
15 SOLUTION RESPIRATORY (INHALATION) 2 TIMES DAILY
Qty: 120 ML | Refills: 1 | Status: SHIPPED | OUTPATIENT
Start: 2023-01-24 | End: 2023-01-25 | Stop reason: SDUPTHER

## 2023-01-24 ASSESSMENT — ENCOUNTER SYMPTOMS
COUGH: 1
NAUSEA: 0
ABDOMINAL PAIN: 0
DIARRHEA: 0
EYES NEGATIVE: 1
VOMITING: 0
SHORTNESS OF BREATH: 1
WHEEZING: 1

## 2023-01-24 NOTE — PROGRESS NOTES
[facility]     Patient: CHEYENNE GARNER, 72 y.o.   : 1950    Pt of Dr. Ontiveros     Subjective     Chief Complaint   Patient presents with    Follow-up     6 month COPD follow up, CT chest 2023        HPI  Cheyenne is here for 6 months follow up for COPD with Ct chest   Active smoker, not actively trying to quit. Has cut back   Declined smoking cessation classes due to the 40 min drive and needing to be here once a week   Current symptoms: worse since weather got colder. Increased wheezing, cough and dyspnea   Feels the Stiolto helps minimally, not lasting long enough   Using neb machine several times per day and Ventolin when away from home   Gets the most benefit from nebulized meds      Any recent exacerbations that have required oral atb or steroids No  Any new medical issues since last visit : No  Ct lung screen 2022  1. Multiple scattered subcentimeter opacities and nodules the upper lobes new from the prior exam likely infectious or inflammatory.  2. There are no pathologically enlarged lymph nodes.  3. LUNGRADS ASSESSMENT VALUE: 3, LUNGRADS S MODIFIER  Last spirometry: 2022. FEV 1 49%     UTD with flu vaccine Yes  UTD with pneumonia vaccine Yes  UTD COVID vaccine yes      SOCIAL HISTORY:  Social History     Tobacco Use    Smoking status: Every Day     Packs/day: 1.00     Years: 30.00     Pack years: 30.00     Types: Cigarettes     Start date: 1975    Smokeless tobacco: Never    Tobacco comments:     Currently smoking 1 pack per day 2023   Substance Use Topics    Alcohol use: No    Drug use: No       CURRENT MEDICATIONS:  Current Outpatient Medications   Medication Sig Dispense Refill    mometasone-formoterol (DULERA) 200-5 MCG/ACT inhaler Inhale 2 puffs into the lungs in the morning and 2 puffs in the evening. Rinse mouth after its use.. 1 each 1    arformoterol tartrate (BROVANA) 15 MCG/2ML NEBU Take 2 mLs by  nebulization in the morning and 2 mLs in the evening. Brovana 15mcg/2ml via nebulization bid. . 120 mL 1    VENTOLIN  (90 Base) MCG/ACT inhaler USE 2 INHALATIONS FOUR TIMES A DAY AS NEEDED FOR WHEEZING OR SHORTNESS OF BREATH 54 g 2    albuterol (PROVENTIL) (2.5 MG/3ML) 0.083% nebulizer solution TAKE 1 VIAL BY NEBULIZATION EVERY 6 HOURS AS NEEDED FOR WHEEZING OR SHORTNESS OF BREATH 375 mL 11    MAGNESIUM PO Take by mouth      ELDERBERRY PO Take by mouth       traZODone (DESYREL) 150 MG tablet Take 25 mg by mouth nightly       calcium carbonate (OSCAL) 500 MG TABS tablet Take 500 mg by mouth daily      clonazePAM (KLONOPIN) 0.25 MG disintegrating tablet Take 0.25 mg by mouth 2 times daily as needed. lamoTRIgine (LAMICTAL) 100 MG tablet Take 100 mg by mouth daily      Cholecalciferol (VITAMIN D PO) Take by mouth daily      vitamin C (ASCORBIC ACID) 500 MG tablet Take 500 mg by mouth daily      Pyridoxine HCl (VITAMIN B-6) 100 MG tablet Take 100 mg by mouth daily. aspirin 81 MG EC tablet Take 81 mg by mouth daily. folic acid (FOLVITE) 1 MG tablet Take 1 mg by mouth daily. omeprazole (PRILOSEC) 20 MG delayed release capsule Take 40 mg by mouth Daily        No current facility-administered medications for this visit. Anna SAM   Review of Systems   Constitutional:  Negative for activity change, appetite change, chills, fatigue, fever and unexpected weight change. HENT: Negative. Eyes: Negative. Respiratory:  Positive for cough, shortness of breath and wheezing. Cardiovascular:  Negative for chest pain, palpitations and leg swelling. Gastrointestinal:  Negative for abdominal pain, diarrhea, nausea and vomiting. Genitourinary: Negative. Musculoskeletal: Negative. Skin: Negative. Neurological: Negative. Hematological: Negative. Psychiatric/Behavioral:  Positive for sleep disturbance. The patient is nervous/anxious.        Physical exam   /80   Pulse (!) 105   Temp 98.1 °F (36.7 °C)   Ht 5' 3\" (1.6 m)   Wt 186 lb (84.4 kg)   SpO2 97%   BMI 32.95 kg/m²       Wt Readings from Last 3 Encounters:   01/24/23 186 lb (84.4 kg)   07/19/22 176 lb 12.8 oz (80.2 kg)   03/22/22 168 lb 6.4 oz (76.4 kg)     Physical Exam  Vitals and nursing note reviewed. Constitutional:       General: She is not in acute distress. Appearance: She is well-developed and overweight. HENT:      Mouth/Throat:      Lips: Pink. Mouth: Mucous membranes are moist.      Pharynx: Oropharynx is clear. No oropharyngeal exudate or posterior oropharyngeal erythema. Eyes:      Conjunctiva/sclera: Conjunctivae normal.   Neck:      Vascular: No JVD. Cardiovascular:      Rate and Rhythm: Normal rate and regular rhythm. Heart sounds: No murmur heard. No friction rub. Pulmonary:      Effort: Pulmonary effort is normal. No accessory muscle usage or respiratory distress. Breath sounds: Normal breath sounds. No wheezing, rhonchi or rales. Chest:      Chest wall: No tenderness. Musculoskeletal:      Right lower leg: No edema. Left lower leg: No edema. Skin:     General: Skin is warm and dry. Capillary Refill: Capillary refill takes less than 2 seconds. Nails: There is no clubbing. Neurological:      Mental Status: She is alert and oriented to person, place, and time. Psychiatric:         Mood and Affect: Mood normal.         Behavior: Behavior normal.         Thought Content: Thought content normal.         Judgment: Judgment normal.        Test results   Lung Nodule Screening     [x] Qualifies    []Does not qualify   [] Declined    [] Completed     Ct chest wo contrast 1/19/2023     Impression   Improved appearance of the chest compared to the prior examination. There are currently no solid-appearing nodules. There is a hazy opacity in the lingula replacing a previously seen nodule. The previous findings were likely infectious or    inflammatory.    Otherwise stable CT chest               **This report has been created using voice recognition software. It may contain minor errors which are inherent in voice recognition technology. **       Final report electronically signed by Dr. Joann Pascual on 1/19/2023 2:29 PM         Assessment       ICD-10-CM    1. Stage 3 severe COPD by GOLD classification ([facility] [address] Inadequately Controlled J44.9 DME Order for Nebulizer as OP     mometasone-formoterol (DULERA) 200-5 MCG/ACT inhaler     arformoterol tartrate (BROVANA) 15 MCG/2ML NEBU      2. Centrilobular emphysema (H[facility]) Stable J43.2       3. Cigarette nicotine dependence with nicotine-induced disorder Stable F17.219           Plan      -COPD Symptoms currently SUb optimally controlled :    Stop Stiolto respimat    Start Dulera 200/5 2 puff BID, rinse mouth after use    Start Brovana neb solution via neb machine BID    Rx for new nebulizer machine per pt request, current machine broke    Continue PRN Ventolin HFA   -Recommend patient stop smoking,Smoking cessation discussed for 3 min    Educated patient on health hazards and negative effects of smoking. Counseled on ways to quit,  Offered cessation assistance with prescription mediations,  over the counter remedies, community resources/ phone APPs. Was provided with pamphlet with helpful tips and resources for smoking cessation.  Declines medications / NRT   Declines classes at [facility] due to travel time  -ct improved, go back to annual LDCT - order at next visit   -avoid ill contacts  -keep UTD on recommended vaccinations    (Please note that portions of this note may have been with a voice recognition program.  Efforts were made to edit the dictation but occasionally words are mis-transcribed )     Will see Cornell Beard in: 3 months  billing based on medical decision making   Electronically signed by RIGOBERTO Mcallister CNP on 1/24/2023 at 2:21 PM

## 2023-01-25 DIAGNOSIS — J44.9 STAGE 3 SEVERE COPD BY GOLD CLASSIFICATION (HCC): ICD-10-CM

## 2023-01-25 RX ORDER — ARFORMOTEROL TARTRATE 15 UG/2ML
15 SOLUTION RESPIRATORY (INHALATION) 2 TIMES DAILY
Qty: 360 ML | Refills: 3 | Status: SHIPPED | OUTPATIENT
Start: 2023-01-25 | End: 2023-02-13 | Stop reason: SINTOL

## 2023-01-25 NOTE — TELEPHONE ENCOUNTER
Pt called and was here yesterday and [facility] doenn't have [employer] in stock. They are unsure when they will. [facility] is [facility].

## 2023-02-08 ENCOUNTER — HOSPITAL ENCOUNTER (EMERGENCY)
Age: 73
Discharge: HOME | End: 2023-02-08
Attending: EMERGENCY MEDICINE
Payer: MEDICARE

## 2023-02-08 VITALS
HEART RATE: 107 BPM | WEIGHT: 190 LBS | SYSTOLIC BLOOD PRESSURE: 165 MMHG | OXYGEN SATURATION: 94 % | HEIGHT: 63 IN | BODY MASS INDEX: 33.66 KG/M2 | TEMPERATURE: 97.9 F | DIASTOLIC BLOOD PRESSURE: 77 MMHG | RESPIRATION RATE: 16 BRPM
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
## 2023-02-08 DIAGNOSIS — T14.8XXA MULTIPLE SKIN TEARS: Primary | ICD-10-CM

## 2023-02-08 PROCEDURE — 90715 TDAP VACCINE 7 YRS/> IM: CPT | Performed by: EMERGENCY MEDICINE

## 2023-02-08 PROCEDURE — 6360000002 HC RX W HCPCS: Performed by: EMERGENCY MEDICINE

## 2023-02-08 PROCEDURE — 6370000000 HC RX 637 (ALT 250 FOR IP): Performed by: EMERGENCY MEDICINE

## 2023-02-08 PROCEDURE — 90471 IMMUNIZATION ADMIN: CPT | Performed by: EMERGENCY MEDICINE

## 2023-02-08 PROCEDURE — 99284 EMERGENCY DEPT VISIT MOD MDM: CPT

## 2023-02-08 RX ORDER — BACITRACIN, NEOMYCIN, POLYMYXIN B 400; 3.5; 5 [USP'U]/G; MG/G; [USP'U]/G
OINTMENT TOPICAL ONCE
Status: COMPLETED | OUTPATIENT
Start: 2023-02-08 | End: 2023-02-08

## 2023-02-08 RX ADMIN — BACITRACIN ZINC, NEOMYCIN, POLYMYXIN B 1 G: 400; 3.5; 5 OINTMENT TOPICAL at 11:58

## 2023-02-08 RX ADMIN — TETANUS TOXOID, REDUCED DIPHTHERIA TOXOID AND ACELLULAR PERTUSSIS VACCINE, ADSORBED 0.5 ML: 5; 2.5; 8; 8; 2.5 SUSPENSION INTRAMUSCULAR at 11:59

## 2023-02-08 ASSESSMENT — PAIN DESCRIPTION - PAIN TYPE: TYPE: ACUTE PAIN

## 2023-02-08 ASSESSMENT — PAIN DESCRIPTION - LOCATION: LOCATION: ARM

## 2023-02-08 ASSESSMENT — PAIN - FUNCTIONAL ASSESSMENT: PAIN_FUNCTIONAL_ASSESSMENT: 0-10

## 2023-02-08 ASSESSMENT — PAIN DESCRIPTION - DESCRIPTORS: DESCRIPTORS: BURNING

## 2023-02-08 ASSESSMENT — PAIN SCALES - GENERAL: PAINLEVEL_OUTOF10: 3

## 2023-02-08 ASSESSMENT — PAIN DESCRIPTION - ORIENTATION: ORIENTATION: LEFT

## 2023-02-08 NOTE — ED PROVIDER NOTES
[address]  [address] [place] [address]  Phone: Charles Ochoa      Pt Name: Ronni Niño  MRN: [id number]  [place] 1950  Date of evaluation: 2/8/2023  Provider: Celio Haines MD    [address] [place] [address]       Chief Complaint   Patient presents with    Fall    Other     Skin tear left arm         HISTORY OF PRESENT ILLNESS      Ronni iNño is a 81 y.o. female who presents to the emergency department with above-noted complaint. Slipped on patio getting skin tears her left arm left elbow forearm area. No head injury or loss of consciousness. No complaints of bone pain        REVIEW OF SYSTEMS     Positive for fall injury and skin tears. No weakness no syncope  Review of Systems  All systems negative except as marked. PAST MEDICAL HISTORY     Past Medical History:   Diagnosis Date    Acid reflux disease     COPD (chronic obstructive pulmonary disease) ([facility] [address]     Depression          SURGICAL HISTORY       Past Surgical History:   Procedure Laterality Date    APPENDECTOMY      COLONOSCOPY      CYST REMOVAL      multiple    HYSTERECTOMY (CERVIX STATUS UNKNOWN)      [place] LAPAROSCOPY SURG CHOLECYSTECTOMY N/A 1/29/2018    CHOLECYSTECTOMY LAPAROSCOPIC performed by Jameson Centeno DO at [address]       Previous Medications    ALBUTEROL (PROVENTIL) (2.5 MG/3ML) 0.083% NEBULIZER SOLUTION    TAKE 1 VIAL BY NEBULIZATION EVERY 6 HOURS AS NEEDED FOR WHEEZING OR SHORTNESS OF BREATH    ARFORMOTEROL TARTRATE (BROVANA) 15 MCG/2ML NEBU    Take 2 mLs by nebulization in the morning and 2 mLs in the evening. Brovana 15mcg/2ml via nebulization bid. .    ASPIRIN 81 MG EC TABLET    Take 81 mg by mouth daily.       CALCIUM CARBONATE (OSCAL) 500 MG TABS TABLET    Take 500 mg by mouth daily    CHOLECALCIFEROL (VITAMIN D PO)    Take by mouth daily    CLONAZEPAM (KLONOPIN) 0.25 MG DISINTEGRATING TABLET    Take 0.25 mg by mouth 2 times daily as needed. ELDERBERRY PO    Take by mouth     FOLIC ACID (FOLVITE) 1 MG TABLET    Take 1 mg by mouth daily. LAMOTRIGINE (LAMICTAL) 100 MG TABLET    Take 100 mg by mouth daily    MAGNESIUM PO    Take by mouth    MOMETASONE-FORMOTEROL (DULERA) 200-5 MCG/ACT INHALER    Inhale 2 puffs into the lungs in the morning and 2 puffs in the evening. Rinse mouth after its use. Eliceo Philippe OMEPRAZOLE (PRILOSEC) 20 MG DELAYED RELEASE CAPSULE    Take 40 mg by mouth Daily     PYRIDOXINE HCL (VITAMIN B-6) 100 MG TABLET    Take 100 mg by mouth daily.     TRAZODONE (DESYREL) 150 MG TABLET    Take 25 mg by mouth nightly     VENTOLIN  (90 BASE) MCG/ACT INHALER    USE 2 INHALATIONS FOUR TIMES A DAY AS NEEDED FOR WHEEZING OR SHORTNESS OF BREATH    VITAMIN C (ASCORBIC ACID) 500 MG TABLET    Take 500 mg by mouth daily       ALLERGIES       Amoxicillin, Hydrochlorothiazide, and Adhesive tape    FAMILY HISTORY       Family History   Problem Relation Age of Onset    Heart Disease Mother     Memory Loss Mother     COPD Mother     Heart Disease Father     Memory Loss Father     COPD Father     Diabetes Maternal Grandmother     Cancer Other           SOCIAL HISTORY       Social History     Tobacco Use    Smoking status: Every Day     Packs/day: 1.00     Years: 30.00     Pack years: 30.00     Types: Cigarettes     Start date: 7/22/1975    Smokeless tobacco: Never    Tobacco comments:     Currently smoking 1 pack per day 01/24/2023   Substance Use Topics    Alcohol use: No    Drug use: No         PHYSICAL EXAM           Physical Exam    VITAL SIGNS: BP (!) 165/77   Pulse (!) 107   Temp 97.9 °F (36.6 °C) (Oral)   Resp 16   Ht 5' 3\" (1.6 m)   Wt 190 lb (86.2 kg)   SpO2 94%   BMI 33.66 kg/m²    Constitutional:  Alert not toxtic or ill,   HENT:  Normocephalic, Atraumatic, nontender  Cervical Spine: Normal range of motion,  No stridor.,  Nontender  Eyes:  No discharge or Swelling  Respiratory: No respiratory distress  Musculoskeletal:  Intact distal pulses, No edema, large area of denuded skin tear left forearm and lateral elbow area. No step-offs or bony deformities. Flexion extension are normal.    Integument:  Warm, Dry, No erythema, No rash (on exposed areas)   Neurologic:  Alert & appropriate   Psychiatric:  Affect normal    DIAGNOSTIC RESULTS       EKG:  RADIOLOGY:         Interpretation per the Radiologist below, if available at the time of this note:    No orders to display         LABS:  Labs Reviewed - No data to display    All other labs were within normal range or not returned as of this dictation. MIPS    Not applicable      EMERGENCY DEPARTMENT COURSE and DIFFERENTIAL DIAGNOSIS/MDM:   Skin tears of the arm. Neurovascular exam is otherwise intact. Nothing else to do as far as intervention wise. Counseled patient regards to care. We will update tetanus and dress wounds. Monitor for infection    1)  Number and Complexity of Problems  Problem List This Visit: Skin tears of the arm  Problem List Items Addressed This Visit    None  Visit Diagnoses       Multiple skin tears    -  Primary    Fall, initial encounter                Differential Diagnosis: Skin tears, ligament sprain, fractures, syncope      2)  Data Reviewed    Imaging that is independently reviewed with the associated radiologist report, not interpreted by me are:  Not applicable    Imaging that is independently reviewed and interpreted by me as:  Not applicable        See more data below for the lab and radiology tests and orders. 3)  Treatment and Disposition    Shared Decision Making:  Not applicable      REASSESSMENT     11:43 AM           PROCEDURES:   none    Procedures     FINAL IMPRESSION      1. Multiple skin tears    2.  Fall, initial encounter          DISPOSITION/PLAN     DISPOSITION Decision To Discharge 02/08/2023 11:41:59 AM      PATIENT REFERRED TO:  RIGOBERTO Sanders [facility]  [address] [facility] [address]  [phone]    Call   Follow up from ER condition    DISCHARGE MEDICATIONS:  New Prescriptions    No medications on file       (Please note that portions of this note were completed with a voice recognition program.  Efforts were made to edit the dictations but occasionally words are mis-transcribed.)    Joanna Carrizales MD (electronically signed)  Attending Emergency Physician                      Joanna Carrizales MD  02/08/23 [id number]

## 2023-02-08 NOTE — ED NOTES
Patient presents to the ED via private auto with complaints of fall at home. States she tripped on the last step and fell onto the concrete. Denies hitting her head. Multiple skin tears noted to the left forearm.      Alf Forrest RN  02/08/23 [id number]

## 2023-02-08 NOTE — DISCHARGE INSTRUCTIONS
Continue home medication. Monitor for infection worsening symptoms or progression. Use Tylenol or Motrin over-the-counter for pain.

## 2023-02-08 NOTE — ED NOTES
Wound cleansed and dressing with antibiotic ointment and non stick pad. Wrapped with coban.      Doc GM Garcia  02/08/23 [id number]

## 2023-02-08 NOTE — ED NOTES
Pt given discharge instructions. Verbalized understanding and left ambulatory per self. Pt in stable condition.       Constance Arceo RN  02/08/23 [id number]

## 2023-02-10 ENCOUNTER — TELEPHONE (OUTPATIENT)
Dept: PULMONOLOGY | Age: 73
End: 2023-02-10

## 2023-02-10 NOTE — TELEPHONE ENCOUNTER
Returned patients call, she had lvm with office. I phoned her and she stated , that she was having trouble with the brovana solution and wants to switch back to stiolto. I asked her what kind of issues she had been having. Pt stated when she went to the ER and they took her BP it was high, she is having trouble using the restroom, and her heart rate is higher than normal . She stated she discontinued the brovana solution and used the stiolto and is sure it is the brovana causing the issues as she is feeling much better with the stiolto. Pt was also concerned with what she can do with the rest of the brovana solution since she hates to see that money go to waste. I told her I would pass this information along to you and that we would be out of office for the weekend. Pt verbalized understanding.  AM 2.10.23

## 2023-02-13 NOTE — TELEPHONE ENCOUNTER
Unfortunately I do not know of anyone that takes medications. [facility] [address] . SHE WILL ALSO NEED TO STOP [address] ( if taking)   Can not take Stiolto and Dulera together. I will send more refills for Stiolto.    She should only use Stiolto and albuterol meds

## 2023-02-24 ENCOUNTER — TELEPHONE (OUTPATIENT)
Dept: PULMONOLOGY | Age: 73
End: 2023-02-24

## 2023-02-24 DIAGNOSIS — J44.9 STAGE 2 MODERATE COPD BY GOLD CLASSIFICATION (HCC): ICD-10-CM

## 2023-02-24 NOTE — TELEPHONE ENCOUNTER
Patient called and stated that she needs a refill of Albuterol Nebulizer solution. It was sent to [facility] but she would [facility] Please advise.

## 2023-02-27 RX ORDER — ALBUTEROL SULFATE 2.5 MG/3ML
2.5 SOLUTION RESPIRATORY (INHALATION) EVERY 6 HOURS PRN
Qty: 375 ML | Refills: 11 | Status: SHIPPED | OUTPATIENT
Start: 2023-02-27

## 2023-02-27 NOTE — TELEPHONE ENCOUNTER
[facility]. There is national shortage of albuterol solution so she may have trouble getting it.

## 2023-04-25 ENCOUNTER — OFFICE VISIT (OUTPATIENT)
Dept: PULMONOLOGY | Age: 73
End: 2023-04-25
Payer: MEDICARE

## 2023-04-25 VITALS
TEMPERATURE: 98.9 F | HEIGHT: 63 IN | SYSTOLIC BLOOD PRESSURE: 134 MMHG | WEIGHT: 189 LBS | HEART RATE: 99 BPM | DIASTOLIC BLOOD PRESSURE: 58 MMHG | BODY MASS INDEX: 33.49 KG/M2 | OXYGEN SATURATION: 96 %

## 2023-04-25 DIAGNOSIS — F17.219 CIGARETTE NICOTINE DEPENDENCE WITH NICOTINE-INDUCED DISORDER: ICD-10-CM

## 2023-04-25 DIAGNOSIS — J44.9 STAGE 3 SEVERE COPD BY GOLD CLASSIFICATION (HCC): Primary | ICD-10-CM

## 2023-04-25 DIAGNOSIS — J43.2 CENTRILOBULAR EMPHYSEMA (HCC): ICD-10-CM

## 2023-04-25 PROCEDURE — 99213 OFFICE O/P EST LOW 20 MIN: CPT | Performed by: NURSE PRACTITIONER

## 2023-04-25 PROCEDURE — 99406 BEHAV CHNG SMOKING 3-10 MIN: CPT | Performed by: NURSE PRACTITIONER

## 2023-04-25 PROCEDURE — G0296 VISIT TO DETERM LDCT ELIG: HCPCS | Performed by: NURSE PRACTITIONER

## 2023-04-25 PROCEDURE — 1123F ACP DISCUSS/DSCN MKR DOCD: CPT | Performed by: NURSE PRACTITIONER

## 2023-04-25 ASSESSMENT — ENCOUNTER SYMPTOMS
NAUSEA: 0
SHORTNESS OF BREATH: 1
ABDOMINAL PAIN: 0
CHEST TIGHTNESS: 1
DIARRHEA: 0
COUGH: 1
VOMITING: 0
EYES NEGATIVE: 1
WHEEZING: 1

## 2023-04-25 NOTE — PROGRESS NOTES
[facility]     Patient: Remi Fink, 81 y.o.   : 1950    Pt of Dr. Farida Mohan   Patient presents with    Follow-up     Copd 3 mo med check         HPI  Rick Zavala is here for 3 months follow up for COPD med check   Stopped taking Donnise Doles and Angela Uzma, - was having elevated BP readings when taking Angela Uzma back on stiolto respimat   Any new medical issues since last visit : yes - in ER for skin tears 2023  BP better     Current Symptoms : stable. Good and bad days    Does not want to change inhalers again \" threw away too much money\"    Current treatment includes .   Stiolto respimat    She requires her rescue inhaler and /or nebulizer     Any recent exacerbations that have required oral atb or steroids No  Smoking 1 PPD     SOCIAL HISTORY:  Social History     Tobacco Use    Smoking status: Every Day     Packs/day: 1.00     Years: 30.00     Pack years: 30.00     Types: Cigarettes     Start date: 1975    Smokeless tobacco: Never    Tobacco comments:     Currently smoking 1 pack per day 2023   Substance Use Topics    Alcohol use: No    Drug use: No       CURRENT MEDICATIONS:  Current Outpatient Medications   Medication Sig Dispense Refill    albuterol (PROVENTIL) (2.5 MG/3ML) 0.083% nebulizer solution Take 3 mLs by nebulization every 6 hours as needed for Wheezing or Shortness of Breath 375 mL 11    tiotropium-olodaterol (STIOLTO) 2.5-2.5 MCG/ACT AERS Inhale 2 puffs into the lungs daily 3 each 3    VENTOLIN  (90 Base) MCG/ACT inhaler USE 2 INHALATIONS FOUR TIMES A DAY AS NEEDED FOR WHEEZING OR SHORTNESS OF BREATH 54 g 2    MAGNESIUM PO Take by mouth      ELDERBERRY PO Take by mouth       traZODone (DESYREL) 150 MG tablet Take 25 mg by mouth nightly       calcium carbonate (OSCAL) 500 MG TABS tablet Take 500 mg by mouth daily      clonazePAM (KLONOPIN) 0.25 MG disintegrating tablet Take 0.25 mg by mouth 2

## 2023-05-01 ENCOUNTER — TELEPHONE (OUTPATIENT)
Dept: PULMONOLOGY | Age: 73
End: 2023-05-01

## 2023-05-01 RX ORDER — PREDNISONE 20 MG/1
20 TABLET ORAL DAILY
Qty: 10 TABLET | Refills: 0 | Status: SHIPPED | OUTPATIENT
Start: 2023-05-01 | End: 2023-05-11

## 2023-10-04 DIAGNOSIS — J44.9 STAGE 3 SEVERE COPD BY GOLD CLASSIFICATION (HCC): ICD-10-CM

## 2023-10-04 RX ORDER — ALBUTEROL SULFATE 90 UG/1
AEROSOL, METERED RESPIRATORY (INHALATION)
Qty: 54 G | Refills: 2 | Status: SHIPPED | OUTPATIENT
Start: 2023-10-04

## 2023-11-29 ENCOUNTER — HOSPITAL ENCOUNTER (OUTPATIENT)
Age: 73
Discharge: HOME | End: 2023-11-29
Payer: MEDICARE

## 2023-11-29 ENCOUNTER — HOSPITAL ENCOUNTER (OUTPATIENT)
Dept: GENERAL RADIOLOGY | Age: 73
Discharge: HOME | End: 2023-11-29
Payer: MEDICARE

## 2023-11-29 DIAGNOSIS — J20.9 ACUTE BRONCHITIS, UNSPECIFIED ORGANISM: ICD-10-CM

## 2023-11-29 DIAGNOSIS — R06.02 SHORTNESS OF BREATH: ICD-10-CM

## 2023-11-29 DIAGNOSIS — J44.1 OBSTRUCTIVE CHRONIC BRONCHITIS WITH EXACERBATION (HCC): ICD-10-CM

## 2023-11-29 PROCEDURE — 71046 X-RAY EXAM CHEST 2 VIEWS: CPT

## 2024-01-04 ENCOUNTER — HOSPITAL ENCOUNTER (OUTPATIENT)
Dept: GENERAL RADIOLOGY | Age: 74
Discharge: HOME | End: 2024-01-06
Payer: MEDICARE

## 2024-01-04 DIAGNOSIS — M53.3 SACROILIAC JOINT PAIN: ICD-10-CM

## 2024-01-04 DIAGNOSIS — M54.50 LOW BACK PAIN, UNSPECIFIED BACK PAIN LATERALITY, UNSPECIFIED CHRONICITY, UNSPECIFIED WHETHER SCIATICA PRESENT: ICD-10-CM

## 2024-01-04 PROCEDURE — 72202 X-RAY EXAM SI JOINTS 3/> VWS: CPT

## 2024-01-04 PROCEDURE — 72110 X-RAY EXAM L-2 SPINE 4/>VWS: CPT

## 2024-01-05 ENCOUNTER — TELEPHONE (OUTPATIENT)
Dept: PULMONOLOGY | Age: 74
End: 2024-01-05

## 2024-01-05 NOTE — TELEPHONE ENCOUNTER
Called patient regarding pulmonary referral. She is going to check with her insurance to make sure we are in network. If so, patient will call office back to schedule. Phone number provided.

## 2024-01-15 ENCOUNTER — TELEPHONE (OUTPATIENT)
Dept: ONCOLOGY | Age: 74
End: 2024-01-15

## 2024-01-22 ENCOUNTER — HOSPITAL ENCOUNTER (OUTPATIENT)
Dept: CT IMAGING | Age: 74
Discharge: HOME | End: 2024-01-24
Payer: MEDICARE

## 2024-01-22 DIAGNOSIS — F17.219 CIGARETTE NICOTINE DEPENDENCE WITH NICOTINE-INDUCED DISORDER: ICD-10-CM

## 2024-01-22 PROCEDURE — 71271 CT THORAX LUNG CANCER SCR C-: CPT

## 2024-01-26 ENCOUNTER — TELEPHONE (OUTPATIENT)
Dept: PULMONOLOGY | Age: 74
End: 2024-01-26

## 2024-01-26 NOTE — TELEPHONE ENCOUNTER
----- Message from RIGOBERTO Chandra CNP sent at 1/25/2024 12:10 PM EST -----  Please fax copy of CT results of patient's primary care practitioner.  Would like her to review the Liver findings and address these , I sent patient a my chart message letting her know I am forwarding copy to her primary care

## 2024-02-02 ENCOUNTER — TELEPHONE (OUTPATIENT)
Dept: WOUND CARE | Age: 74
End: 2024-02-02

## 2024-02-02 NOTE — TELEPHONE ENCOUNTER
Received a call from patients provider requesting appointment with Dr. Baez for fever of unknown origin. Provider sending over referral. Did offer ID clinic with CNP but provider requesting Dr. Baez. Appointment made for 2/14/24 at 9am.

## 2024-02-08 ENCOUNTER — HOSPITAL ENCOUNTER (OUTPATIENT)
Dept: GENERAL RADIOLOGY | Age: 74
Discharge: HOME | End: 2024-02-10
Payer: MEDICARE

## 2024-02-08 ENCOUNTER — HOSPITAL ENCOUNTER (OUTPATIENT)
Dept: INTERVENTIONAL RADIOLOGY/VASCULAR | Age: 74
Discharge: HOME | End: 2024-02-10
Payer: MEDICARE

## 2024-02-08 DIAGNOSIS — R10.84 GENERALIZED ABDOMINAL PAIN: ICD-10-CM

## 2024-02-08 DIAGNOSIS — R93.2 ABNORMAL LIVER SCAN: ICD-10-CM

## 2024-02-08 PROCEDURE — 74018 RADEX ABDOMEN 1 VIEW: CPT

## 2024-02-08 PROCEDURE — 76705 ECHO EXAM OF ABDOMEN: CPT

## 2024-02-28 ENCOUNTER — HOSPITAL ENCOUNTER (OUTPATIENT)
Dept: WOUND CARE | Age: 74
Discharge: HOME | End: 2024-02-28
Attending: INTERNAL MEDICINE
Payer: MEDICARE

## 2024-02-28 VITALS
RESPIRATION RATE: 18 BRPM | HEIGHT: 63 IN | BODY MASS INDEX: 33.66 KG/M2 | TEMPERATURE: 97.6 F | OXYGEN SATURATION: 93 % | HEART RATE: 107 BPM | DIASTOLIC BLOOD PRESSURE: 68 MMHG | SYSTOLIC BLOOD PRESSURE: 137 MMHG | WEIGHT: 190 LBS

## 2024-02-28 DIAGNOSIS — R50.9 FUO (FEVER OF UNKNOWN ORIGIN): Primary | ICD-10-CM

## 2024-02-28 PROCEDURE — 99212 OFFICE O/P EST SF 10 MIN: CPT

## 2024-02-28 RX ORDER — AMPICILLIN TRIHYDRATE 250 MG
CAPSULE ORAL
COMMUNITY

## 2024-02-28 NOTE — PLAN OF CARE
Problem: Skin/Tissue Integrity  Goal: Absence of new skin breakdown  Description: 1.  Monitor for areas of redness and/or skin breakdown  Outcome: Progressing     Patient presents to wound clinic for fever. See AVS for discharge instructions. Follow up visit: 2 weeks on Wednesday March 13th at 10:00 am     Care plan reviewed with patient.  Patient verbalize understanding of the plan of care and contribute to goal setting.

## 2024-02-28 NOTE — WOUND CARE
no blurring of vision, no double vision.  Ears: no hearing difficulty, no tinnitus  Mouth/throat: no ulceration, dental caries , dysphagia  Lungs: no cough, no shortness of breath, no wheeze  CVS: no palpitation, no chest pain, no shortness of breath  GI: no abdominal pain, no nausea , no vomiting, no constipation  ZORA: no dysuria, frequency and urgency, no hematuria, no kidney stones  Musculoskeletal: no joint pain, swelling , stiffness,  Endocrine: no polyuria, polydipsia, no cold or heat intolerance  Hematology: no anemia, no easy brusing or bleeding, no hx of clotting disorder  Dermatology: no skin rash, no eczema, no pruritis,  Psychiatry: no depression, no anxiety,no panic attacks, no suicide ideation        PHYSICAL EXAM:     /68   Pulse (!) 107   Temp 97.6 °F (36.4 °C) (Infrared)   Resp 18   Ht 1.6 m (5' 3\")   Wt 86.2 kg (190 lb)   SpO2 93%   BMI 33.66 kg/m²   Wt Readings from Last 3 Encounters:   02/28/24 86.2 kg (190 lb)   04/25/23 85.7 kg (189 lb)   02/08/23 86.2 kg (190 lb)     General:  Awake, alert, not in distress.  HEENT: pink conjunctiva, unicteric sclera, moist oral mucosa.  Chest: Bilateral air entry diminished breath sounds  Cardiovascular:  RRR ,S1S2, no murmur or gallop.  Abdomen:  Soft, non tender to palpation.  Extremities: No edema  Skin:  Warm and dry.  CNS: Awake and oriented            LABS    LABS   No new lab.  Assessment:   Fever: Low-grade.  COPD  Fatty liver.  Will do CBC complete metabolic panel and CRP.  Patient was advised to monitor her temperature daily before starting to smoke and bring in 2 weeks record of her temperatures.  She was advised against smoking but she is not planning to quit  Will schedule her based on lab work further recommendation will follow.  Patient Active Problem List   Diagnosis Code    Psychophysiologic insomnia F51.04    Fibromyalgia M79.7    Recurrent major depression in partial remission (HCC) F33.41    Stage 3 severe COPD by GOLD

## 2024-02-28 NOTE — PATIENT INSTRUCTIONS
Visit Discharge/Physician Orders:  - CBC BMP CRP ordered today. Have done prior to your next appointment   - Keep track of your temperatures every morning for 2 weeks. Bring with you to your next appointment. Do temperature in your mouth prior to brushing your teeth or smoking.  - Take a picture of your sore next time you see one and call us and we will have you send it to us to review with Dr Baez     Follow up visit: 2 weeks on Wednesday March 13th at 10:00 am     Keep next scheduled appointment. Please give 24 hour notice if unable to keep appointment. [phone]    If you experience any of the following, please call the Wound Care Service during business hours: Monday through Friday 8:00 am - 4:30 pm  ([phone]).   *Increase in pain   *Temperature over 101   *Increase in drainage from your wound or a foul odor   *Uncontrolled swelling   *Need for compression bandage changes due to slippage, breakthrough drainage    If you need medical attention outside of business hours, please contact your Primary Care Doctor or go to the nearest emergency room.

## 2024-03-04 ENCOUNTER — HOSPITAL ENCOUNTER (OUTPATIENT)
Age: 74
Discharge: HOME | End: 2024-03-04
Payer: MEDICARE

## 2024-03-04 DIAGNOSIS — R50.9 FUO (FEVER OF UNKNOWN ORIGIN): ICD-10-CM

## 2024-03-04 LAB
ALBUMIN SERPL-MCNC: 3.8 G/DL (ref 3.5–5.2)
ALBUMIN/GLOB SERPL: 1.4 {RATIO} (ref 1–2.5)
ALP SERPL-CCNC: 108 U/L (ref 35–104)
ALT SERPL-CCNC: 23 U/L (ref 5–33)
ANION GAP SERPL CALCULATED.3IONS-SCNC: 9 MMOL/L (ref 9–17)
AST SERPL-CCNC: 17 U/L
BILIRUB SERPL-MCNC: 0.5 MG/DL (ref 0.3–1.2)
BUN SERPL-MCNC: 12 MG/DL (ref 8–23)
BUN/CREAT SERPL: 17 (ref 9–20)
CALCIUM SERPL-MCNC: 9.3 MG/DL (ref 8.6–10.4)
CHLORIDE SERPL-SCNC: 105 MMOL/L (ref 98–107)
CO2 SERPL-SCNC: 28 MMOL/L (ref 20–31)
CREAT SERPL-MCNC: 0.7 MG/DL (ref 0.5–0.9)
CRP SERPL HS-MCNC: 6.5 MG/L (ref 0–5)
ERYTHROCYTE [DISTWIDTH] IN BLOOD BY AUTOMATED COUNT: 13.7 % (ref 11.8–14.4)
GFR SERPL CREATININE-BSD FRML MDRD: >60 ML/MIN/1.73M2
GLUCOSE SERPL-MCNC: 110 MG/DL (ref 70–99)
HCT VFR BLD AUTO: 44.2 % (ref 36.3–47.1)
HGB BLD-MCNC: 14.5 G/DL (ref 11.9–15.1)
MCH RBC QN AUTO: 29.7 PG (ref 25.2–33.5)
MCHC RBC AUTO-ENTMCNC: 32.8 G/DL (ref 25.2–33.5)
MCV RBC AUTO: 90.4 FL (ref 82.6–102.9)
NRBC BLD-RTO: 0 PER 100 WBC
PLATELET # BLD AUTO: 272 K/UL (ref 138–453)
PMV BLD AUTO: 9.4 FL (ref 8.1–13.5)
POTASSIUM SERPL-SCNC: 4.5 MMOL/L (ref 3.7–5.3)
PROT SERPL-MCNC: 6.5 G/DL (ref 6.4–8.3)
RBC # BLD AUTO: 4.89 M/UL (ref 3.95–5.11)
SODIUM SERPL-SCNC: 142 MMOL/L (ref 135–144)
WBC OTHER # BLD: 8.3 K/UL (ref 3.5–11.3)

## 2024-03-04 PROCEDURE — 86140 C-REACTIVE PROTEIN: CPT

## 2024-03-04 PROCEDURE — 36415 COLL VENOUS BLD VENIPUNCTURE: CPT

## 2024-03-04 PROCEDURE — 80053 COMPREHEN METABOLIC PANEL: CPT

## 2024-03-04 PROCEDURE — 85027 COMPLETE CBC AUTOMATED: CPT

## 2024-03-13 ENCOUNTER — HOSPITAL ENCOUNTER (OUTPATIENT)
Dept: WOUND CARE | Age: 74
Discharge: HOME | End: 2024-03-13
Attending: INTERNAL MEDICINE
Payer: MEDICARE

## 2024-03-13 VITALS
HEART RATE: 102 BPM | SYSTOLIC BLOOD PRESSURE: 131 MMHG | OXYGEN SATURATION: 94 % | TEMPERATURE: 97.4 F | DIASTOLIC BLOOD PRESSURE: 65 MMHG | RESPIRATION RATE: 18 BRPM

## 2024-03-13 PROCEDURE — 99211 OFF/OP EST MAY X REQ PHY/QHP: CPT

## 2024-03-13 NOTE — PROGRESS NOTES
[facility]          Progress Note and Procedure Note      Cheyenne Garvey  MEDICAL RECORD NUMBER:  [id number]  AGE: 73 y.o.   GENDER: female  : 1950  EPISODE DATE:  3/13/2024    Subjective:     SUBJECTIVE     Chief Complaint   Patient presents with    Other     fever           HISTORY OF PRESENT ILLNESS      Cheyenne Garvey is a 73 y.o. female who presents today for wound/ulcer evaluation.  She was seen for follow-up visit.  She was advised to take her temperature for the last 1 week and bring the report to see if she has been having fever.  For the last 1 week patient did not have any fever recorded she does not have any other complaints.  She continues to smoke however she is thinking about stopping she denies any pain any night sweats     PAST MEDICAL HISTORY         Diagnosis Date    Acid reflux disease     COPD (chronic obstructive pulmonary disease) (HCC)     Depression          PAST SURGICAL HISTORY     Past Surgical History:   Procedure Laterality Date    APPENDECTOMY      COLONOSCOPY      CYST REMOVAL      multiple    HYSTERECTOMY (CERVIX STATUS UNKNOWN)      [place] LAPAROSCOPY SURG CHOLECYSTECTOMY N/A 2018    CHOLECYSTECTOMY LAPAROSCOPIC performed by Luc Martin DO at [facility] OR    TUBAL LIGATION      UPPER GASTROINTESTINAL ENDOSCOPY       FAMILY HISTORY         Family History   Problem Relation Age of Onset    Heart Disease Mother     Memory Loss Mother     COPD Mother     Heart Disease Father     Memory Loss Father     COPD Father     Diabetes Maternal Grandmother     Cancer Other      SOCIAL HISTORY       Social History     Tobacco Use    Smoking status: Every Day     Current packs/day: 1.00     Average packs/day: 1 pack/day for 48.6 years (48.6 ttl pk-yrs)     Types: Cigarettes     Start date: 1975     Passive exposure: Never    Smokeless tobacco: Never    Tobacco comments:     Currently smoking 1 pack per day    Vaping Use    Vaping Use: Never used   Substance Use

## 2024-03-13 NOTE — PLAN OF CARE
Problem: Skin/Tissue Integrity  Goal: Absence of new skin breakdown  Description: 1.  Monitor for areas of redness and/or skin breakdown  2.  Assess vascular access sites hourly  3.  Every 4-6 hours minimum:  Change oxygen saturation probe site  4.  Every 4-6 hours:  If on nasal continuous positive airway pressure, respiratory therapy assess nares and determine need for appliance change or resting period.  Outcome: Adequate for Discharge   Pt. Seen today for fever see AVS for new orders. Follow up as needed.  Care plan reviewed with patient.  Patient verbalize understanding of the plan of care and contribute to goal setting.

## 2024-03-13 NOTE — PATIENT INSTRUCTIONS
Visit Discharge/Physician Orders:  - no fevers documented on log, labs were normal   - no need for anything further at this time      Follow up visit: call with any issues or concerns    Keep next scheduled appointment. Please give 24 hour notice if unable to keep appointment. [phone]    If you experience any of the following, please call the Wound Care Service during business hours: Monday through Friday 8:00 am - 4:30 pm  ([phone]).   *Increase in pain   *Temperature over 101   *Increase in drainage from your wound or a foul odor   *Uncontrolled swelling   *Need for compression bandage changes due to slippage, breakthrough drainage    If you need medical attention outside of business hours, please contact your Primary Care Doctor or go to the nearest emergency room.

## 2024-04-04 ENCOUNTER — APPOINTMENT (OUTPATIENT)
Dept: GENERAL RADIOLOGY | Age: 74
DRG: 192 | End: 2024-04-04
Payer: MEDICARE

## 2024-04-04 ENCOUNTER — HOSPITAL ENCOUNTER (INPATIENT)
Age: 74
LOS: 3 days | Discharge: HOME | DRG: 192 | End: 2024-04-07
Attending: EMERGENCY MEDICINE | Admitting: INTERNAL MEDICINE
Payer: MEDICARE

## 2024-04-04 DIAGNOSIS — J44.1 CHRONIC OBSTRUCTIVE PULMONARY DISEASE WITH ACUTE EXACERBATION (HCC): ICD-10-CM

## 2024-04-04 DIAGNOSIS — T17.928A ASPIRATION OF FOOD, INITIAL ENCOUNTER: ICD-10-CM
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
## 2024-04-04 DIAGNOSIS — J44.1 COPD EXACERBATION (HCC): Primary | ICD-10-CM

## 2024-04-04 PROBLEM — J44.9 COPD (CHRONIC OBSTRUCTIVE PULMONARY DISEASE) (HCC): Status: ACTIVE | Noted: 2024-04-04

## 2024-04-04 LAB
ANION GAP SERPL CALCULATED.3IONS-SCNC: 11 MMOL/L (ref 9–17)
BASOPHILS # BLD: 0.05 K/UL (ref 0–0.2)
BASOPHILS NFR BLD: 0 % (ref 0–2)
BNP SERPL-MCNC: 57 PG/ML
BUN SERPL-MCNC: 14 MG/DL (ref 8–23)
BUN/CREAT SERPL: 23 (ref 9–20)
CALCIUM SERPL-MCNC: 9.6 MG/DL (ref 8.6–10.4)
CHLORIDE SERPL-SCNC: 100 MMOL/L (ref 98–107)
CO2 SERPL-SCNC: 26 MMOL/L (ref 20–31)
CREAT SERPL-MCNC: 0.6 MG/DL (ref 0.5–0.9)
D DIMER PPP FEU-MCNC: 0.31 UG/ML FEU (ref 0–0.59)
EKG ATRIAL RATE: 106 BPM
EKG P AXIS: 80 DEGREES
EKG P-R INTERVAL: 174 MS
EKG Q-T INTERVAL: 346 MS
EKG QRS DURATION: 82 MS
EKG QTC CALCULATION (BAZETT): 459 MS
EKG R AXIS: 21 DEGREES
EKG T AXIS: 65 DEGREES
EKG VENTRICULAR RATE: 106 BPM
EOSINOPHIL # BLD: 0.08 K/UL (ref 0–0.44)
EOSINOPHILS RELATIVE PERCENT: 1 % (ref 1–4)
ERYTHROCYTE [DISTWIDTH] IN BLOOD BY AUTOMATED COUNT: 13.4 % (ref 11.8–14.4)
FLUAV AG SPEC QL: NEGATIVE
FLUBV AG SPEC QL: NEGATIVE
GFR SERPL CREATININE-BSD FRML MDRD: >90 ML/MIN/1.73M2
GLUCOSE BLD-MCNC: 126 MG/DL (ref 65–105)
GLUCOSE BLD-MCNC: 153 MG/DL (ref 65–105)
GLUCOSE SERPL-MCNC: 143 MG/DL (ref 70–99)
HCO3 VENOUS: 25.8 MMOL/L (ref 22–29)
HCO3 VENOUS: 27.2 MMOL/L (ref 22–29)
HCT VFR BLD AUTO: 41.5 % (ref 36.3–47.1)
HGB BLD-MCNC: 13.7 G/DL (ref 11.9–15.1)
IMM GRANULOCYTES # BLD AUTO: 0.05 K/UL (ref 0–0.3)
IMM GRANULOCYTES NFR BLD: 0 %
LACTATE BLDV-SCNC: 2 MMOL/L (ref 0.5–2.2)
LACTATE BLDV-SCNC: 2.2 MMOL/L (ref 0.5–2.2)
LACTATE BLDV-SCNC: 2.3 MMOL/L (ref 0.5–2.2)
LYMPHOCYTES NFR BLD: 0.91 K/UL (ref 1.1–3.7)
LYMPHOCYTES RELATIVE PERCENT: 8 % (ref 24–43)
MCH RBC QN AUTO: 29.6 PG (ref 25.2–33.5)
MCHC RBC AUTO-ENTMCNC: 33 G/DL (ref 25.2–33.5)
MCV RBC AUTO: 89.6 FL (ref 82.6–102.9)
MONOCYTES NFR BLD: 0.56 K/UL (ref 0.1–1.2)
MONOCYTES NFR BLD: 5 % (ref 3–12)
NEGATIVE BASE EXCESS, VEN: 0.5 MMOL/L (ref 0–2)
NEUTROPHILS NFR BLD: 86 % (ref 36–65)
NEUTS SEG NFR BLD: 9.65 K/UL (ref 1.5–8.1)
NRBC BLD-RTO: 0 PER 100 WBC
O2 SAT, VEN: 79.5 % (ref 60–85)
O2 SAT, VEN: 86.1 % (ref 60–85)
PCO2, VEN: 43.4 MM HG (ref 41–51)
PCO2, VEN: 55.1 MM HG (ref 41–51)
PH VENOUS: 7.3 (ref 7.32–7.43)
PH VENOUS: 7.38 (ref 7.32–7.43)
PLATELET # BLD AUTO: 256 K/UL (ref 138–453)
PMV BLD AUTO: 9.2 FL (ref 8.1–13.5)
PO2, VEN: 49.2 MM HG (ref 30–50)
PO2, VEN: 52.7 MM HG (ref 30–50)
POSITIVE BASE EXCESS, VEN: 0.4 MMOL/L (ref 0–3)
POTASSIUM SERPL-SCNC: 4.6 MMOL/L (ref 3.7–5.3)
RBC # BLD AUTO: 4.63 M/UL (ref 3.95–5.11)
SARS-COV-2 RDRP RESP QL NAA+PROBE: NOT DETECTED
SODIUM SERPL-SCNC: 137 MMOL/L (ref 135–144)
SPECIMEN DESCRIPTION: NORMAL
TROPONIN I SERPL HS-MCNC: 7 NG/L (ref 0–14)
WBC OTHER # BLD: 11.3 K/UL (ref 3.5–11.3)

## 2024-04-04 PROCEDURE — 6360000002 HC RX W HCPCS: Performed by: EMERGENCY MEDICINE

## 2024-04-04 PROCEDURE — 94640 AIRWAY INHALATION TREATMENT: CPT

## 2024-04-04 PROCEDURE — 6370000000 HC RX 637 (ALT 250 FOR IP): Performed by: EMERGENCY MEDICINE

## 2024-04-04 PROCEDURE — 80048 BASIC METABOLIC PNL TOTAL CA: CPT

## 2024-04-04 PROCEDURE — 93005 ELECTROCARDIOGRAM TRACING: CPT | Performed by: EMERGENCY MEDICINE

## 2024-04-04 PROCEDURE — 6370000000 HC RX 637 (ALT 250 FOR IP): Performed by: INTERNAL MEDICINE

## 2024-04-04 PROCEDURE — 85025 COMPLETE CBC W/AUTO DIFF WBC: CPT

## 2024-04-04 PROCEDURE — 82947 ASSAY GLUCOSE BLOOD QUANT: CPT

## 2024-04-04 PROCEDURE — 6360000002 HC RX W HCPCS: Performed by: INTERNAL MEDICINE

## 2024-04-04 PROCEDURE — 96374 THER/PROPH/DIAG INJ IV PUSH: CPT

## 2024-04-04 PROCEDURE — 87804 INFLUENZA ASSAY W/OPTIC: CPT

## 2024-04-04 PROCEDURE — 99222 1ST HOSP IP/OBS MODERATE 55: CPT | Performed by: INTERNAL MEDICINE

## 2024-04-04 PROCEDURE — 71045 X-RAY EXAM CHEST 1 VIEW: CPT

## 2024-04-04 PROCEDURE — 83880 ASSAY OF NATRIURETIC PEPTIDE: CPT

## 2024-04-04 PROCEDURE — 94660 CPAP INITIATION&MGMT: CPT

## 2024-04-04 PROCEDURE — 87040 BLOOD CULTURE FOR BACTERIA: CPT

## 2024-04-04 PROCEDURE — 36415 COLL VENOUS BLD VENIPUNCTURE: CPT

## 2024-04-04 PROCEDURE — 2060000000 HC ICU INTERMEDIATE R&B

## 2024-04-04 PROCEDURE — 82803 BLOOD GASES ANY COMBINATION: CPT

## 2024-04-04 PROCEDURE — 87635 SARS-COV-2 COVID-19 AMP PRB: CPT

## 2024-04-04 PROCEDURE — 5A09557 ASSISTANCE WITH RESPIRATORY VENTILATION, GREATER THAN 96 CONSECUTIVE HOURS, CONTINUOUS POSITIVE AIRWAY PRESSURE: ICD-10-PCS | Performed by: INTERNAL MEDICINE

## 2024-04-04 PROCEDURE — 2580000003 HC RX 258: Performed by: INTERNAL MEDICINE

## 2024-04-04 PROCEDURE — 83605 ASSAY OF LACTIC ACID: CPT

## 2024-04-04 PROCEDURE — 99285 EMERGENCY DEPT VISIT HI MDM: CPT

## 2024-04-04 PROCEDURE — 85379 FIBRIN DEGRADATION QUANT: CPT

## 2024-04-04 PROCEDURE — 84484 ASSAY OF TROPONIN QUANT: CPT

## 2024-04-04 RX ORDER — SODIUM CHLORIDE 0.9 % (FLUSH) 0.9 %
10 SYRINGE (ML) INJECTION EVERY 12 HOURS SCHEDULED
Status: DISCONTINUED | OUTPATIENT
Start: 2024-04-04 | End: 2024-04-07 | Stop reason: HOSPADM

## 2024-04-04 RX ORDER — 0.9 % SODIUM CHLORIDE 0.9 %
1000 INTRAVENOUS SOLUTION INTRAVENOUS ONCE
Status: COMPLETED | OUTPATIENT
Start: 2024-04-04 | End: 2024-04-04

## 2024-04-04 RX ORDER — IPRATROPIUM BROMIDE AND ALBUTEROL SULFATE 2.5; .5 MG/3ML; MG/3ML
1 SOLUTION RESPIRATORY (INHALATION)
Status: DISCONTINUED | OUTPATIENT
Start: 2024-04-04 | End: 2024-04-04

## 2024-04-04 RX ORDER — DEXTROSE MONOHYDRATE 100 MG/ML
INJECTION, SOLUTION INTRAVENOUS CONTINUOUS PRN
Status: DISCONTINUED | OUTPATIENT
Start: 2024-04-04 | End: 2024-04-07 | Stop reason: HOSPADM

## 2024-04-04 RX ORDER — CALCIUM CARBONATE 500(1250)
500 TABLET ORAL DAILY
Status: DISCONTINUED | OUTPATIENT
Start: 2024-04-05 | End: 2024-04-07 | Stop reason: HOSPADM

## 2024-04-04 RX ORDER — GLUCAGON 1 MG/ML
1 KIT INJECTION PRN
Status: DISCONTINUED | OUTPATIENT
Start: 2024-04-04 | End: 2024-04-07 | Stop reason: HOSPADM

## 2024-04-04 RX ORDER — ALBUTEROL SULFATE 2.5 MG/3ML
2.5 SOLUTION RESPIRATORY (INHALATION)
Status: DISCONTINUED | OUTPATIENT
Start: 2024-04-04 | End: 2024-04-05

## 2024-04-04 RX ORDER — ASPIRIN 81 MG/1
81 TABLET ORAL NIGHTLY
Status: DISCONTINUED | OUTPATIENT
Start: 2024-04-04 | End: 2024-04-07 | Stop reason: HOSPADM

## 2024-04-04 RX ORDER — ACETAMINOPHEN 325 MG/1
650 TABLET ORAL EVERY 6 HOURS PRN
Status: DISCONTINUED | OUTPATIENT
Start: 2024-04-04 | End: 2024-04-07 | Stop reason: HOSPADM

## 2024-04-04 RX ORDER — INSULIN LISPRO 100 [IU]/ML
0-4 INJECTION, SOLUTION INTRAVENOUS; SUBCUTANEOUS NIGHTLY
Status: DISCONTINUED | OUTPATIENT
Start: 2024-04-04 | End: 2024-04-04 | Stop reason: SDUPTHER

## 2024-04-04 RX ORDER — DEXAMETHASONE SODIUM PHOSPHATE 4 MG/ML
4 INJECTION, SOLUTION INTRA-ARTICULAR; INTRALESIONAL; INTRAMUSCULAR; INTRAVENOUS; SOFT TISSUE ONCE
Status: COMPLETED | OUTPATIENT
Start: 2024-04-04 | End: 2024-04-04

## 2024-04-04 RX ORDER — ASPIRIN 81 MG/1
81 TABLET ORAL DAILY
Status: DISCONTINUED | OUTPATIENT
Start: 2024-04-04 | End: 2024-04-04

## 2024-04-04 RX ORDER — SODIUM CHLORIDE FOR INHALATION 0.9 %
3 VIAL, NEBULIZER (ML) INHALATION
Status: DISCONTINUED | OUTPATIENT
Start: 2024-04-04 | End: 2024-04-05

## 2024-04-04 RX ORDER — TRAZODONE HYDROCHLORIDE 50 MG/1
150 TABLET ORAL NIGHTLY
Status: DISCONTINUED | OUTPATIENT
Start: 2024-04-04 | End: 2024-04-07 | Stop reason: HOSPADM

## 2024-04-04 RX ORDER — IPRATROPIUM BROMIDE AND ALBUTEROL SULFATE 2.5; .5 MG/3ML; MG/3ML
1 SOLUTION RESPIRATORY (INHALATION)
Status: DISCONTINUED | OUTPATIENT
Start: 2024-04-04 | End: 2024-04-04 | Stop reason: SDUPTHER

## 2024-04-04 RX ORDER — ONDANSETRON 2 MG/ML
4 INJECTION INTRAMUSCULAR; INTRAVENOUS EVERY 6 HOURS PRN
Status: DISCONTINUED | OUTPATIENT
Start: 2024-04-04 | End: 2024-04-07 | Stop reason: HOSPADM

## 2024-04-04 RX ORDER — AZITHROMYCIN 250 MG/1
250 TABLET, FILM COATED ORAL DAILY
Status: ON HOLD | COMMUNITY
Start: 2024-04-03 | End: 2024-04-07 | Stop reason: HOSPADM

## 2024-04-04 RX ORDER — LANOLIN ALCOHOL/MO/W.PET/CERES
400 CREAM (GRAM) TOPICAL DAILY
Status: DISCONTINUED | OUTPATIENT
Start: 2024-04-05 | End: 2024-04-07 | Stop reason: HOSPADM

## 2024-04-04 RX ORDER — POTASSIUM CHLORIDE 7.45 MG/ML
10 INJECTION INTRAVENOUS PRN
Status: DISCONTINUED | OUTPATIENT
Start: 2024-04-04 | End: 2024-04-05

## 2024-04-04 RX ORDER — PREDNISONE 20 MG/1
20 TABLET ORAL DAILY
Status: ON HOLD | COMMUNITY
Start: 2024-04-03 | End: 2024-04-07 | Stop reason: HOSPADM

## 2024-04-04 RX ORDER — NICOTINE 21 MG/24HR
1 PATCH, TRANSDERMAL 24 HOURS TRANSDERMAL DAILY
Status: DISCONTINUED | OUTPATIENT
Start: 2024-04-04 | End: 2024-04-07 | Stop reason: HOSPADM

## 2024-04-04 RX ORDER — METHYLPREDNISOLONE SODIUM SUCCINATE 125 MG/2ML
125 INJECTION, POWDER, LYOPHILIZED, FOR SOLUTION INTRAMUSCULAR; INTRAVENOUS EVERY 6 HOURS
Status: DISCONTINUED | OUTPATIENT
Start: 2024-04-04 | End: 2024-04-06

## 2024-04-04 RX ORDER — PANTOPRAZOLE SODIUM 40 MG/1
40 TABLET, DELAYED RELEASE ORAL
Status: DISCONTINUED | OUTPATIENT
Start: 2024-04-05 | End: 2024-04-07 | Stop reason: HOSPADM

## 2024-04-04 RX ORDER — IPRATROPIUM BROMIDE AND ALBUTEROL SULFATE 2.5; .5 MG/3ML; MG/3ML
1 SOLUTION RESPIRATORY (INHALATION)
Status: DISCONTINUED | OUTPATIENT
Start: 2024-04-04 | End: 2024-04-05

## 2024-04-04 RX ORDER — LAMOTRIGINE 100 MG/1
100 TABLET ORAL DAILY
Status: DISCONTINUED | OUTPATIENT
Start: 2024-04-05 | End: 2024-04-07 | Stop reason: HOSPADM

## 2024-04-04 RX ORDER — CALCIUM CARBONATE 300MG(750)
400 TABLET,CHEWABLE ORAL DAILY
Status: DISCONTINUED | OUTPATIENT
Start: 2024-04-04 | End: 2024-04-04 | Stop reason: RX

## 2024-04-04 RX ORDER — SODIUM CHLORIDE 9 MG/ML
INJECTION, SOLUTION INTRAVENOUS PRN
Status: DISCONTINUED | OUTPATIENT
Start: 2024-04-04 | End: 2024-04-07 | Stop reason: HOSPADM

## 2024-04-04 RX ORDER — MAGNESIUM SULFATE IN WATER 40 MG/ML
2000 INJECTION, SOLUTION INTRAVENOUS PRN
Status: DISCONTINUED | OUTPATIENT
Start: 2024-04-04 | End: 2024-04-05

## 2024-04-04 RX ORDER — FOLIC ACID 1 MG/1
1 TABLET ORAL DAILY
Status: DISCONTINUED | OUTPATIENT
Start: 2024-04-05 | End: 2024-04-07 | Stop reason: HOSPADM

## 2024-04-04 RX ORDER — POTASSIUM CHLORIDE 20 MEQ/1
40 TABLET, EXTENDED RELEASE ORAL PRN
Status: DISCONTINUED | OUTPATIENT
Start: 2024-04-04 | End: 2024-04-05

## 2024-04-04 RX ORDER — LEVOFLOXACIN 5 MG/ML
750 INJECTION, SOLUTION INTRAVENOUS EVERY 24 HOURS
Status: DISCONTINUED | OUTPATIENT
Start: 2024-04-04 | End: 2024-04-07

## 2024-04-04 RX ORDER — ALBUTEROL SULFATE 2.5 MG/3ML
SOLUTION RESPIRATORY (INHALATION)
Status: DISPENSED
Start: 2024-04-04 | End: 2024-04-05

## 2024-04-04 RX ORDER — SODIUM CHLORIDE 0.9 % (FLUSH) 0.9 %
10 SYRINGE (ML) INJECTION PRN
Status: DISCONTINUED | OUTPATIENT
Start: 2024-04-04 | End: 2024-04-07 | Stop reason: HOSPADM

## 2024-04-04 RX ORDER — ONDANSETRON 4 MG/1
4 TABLET, ORALLY DISINTEGRATING ORAL EVERY 8 HOURS PRN
Status: DISCONTINUED | OUTPATIENT
Start: 2024-04-04 | End: 2024-04-07 | Stop reason: HOSPADM

## 2024-04-04 RX ORDER — INSULIN LISPRO 100 [IU]/ML
0-4 INJECTION, SOLUTION INTRAVENOUS; SUBCUTANEOUS EVERY 6 HOURS
Status: DISCONTINUED | OUTPATIENT
Start: 2024-04-04 | End: 2024-04-05

## 2024-04-04 RX ORDER — CALCIUM CARBONATE 500(1250)
500 TABLET ORAL DAILY
Status: DISCONTINUED | OUTPATIENT
Start: 2024-04-04 | End: 2024-04-04 | Stop reason: RX

## 2024-04-04 RX ORDER — ENOXAPARIN SODIUM 100 MG/ML
40 INJECTION SUBCUTANEOUS DAILY
Status: DISCONTINUED | OUTPATIENT
Start: 2024-04-04 | End: 2024-04-07 | Stop reason: HOSPADM

## 2024-04-04 RX ADMIN — ASPIRIN 81 MG: 81 TABLET, COATED ORAL at 21:32

## 2024-04-04 RX ADMIN — SODIUM CHLORIDE, PRESERVATIVE FREE 10 ML: 5 INJECTION INTRAVENOUS at 17:07

## 2024-04-04 RX ADMIN — IPRATROPIUM BROMIDE AND ALBUTEROL SULFATE 1 DOSE: .5; 2.5 SOLUTION RESPIRATORY (INHALATION) at 20:07

## 2024-04-04 RX ADMIN — ENOXAPARIN SODIUM 40 MG: 100 INJECTION SUBCUTANEOUS at 17:19

## 2024-04-04 RX ADMIN — METHYLPREDNISOLONE SODIUM SUCCINATE 125 MG: 125 INJECTION INTRAMUSCULAR; INTRAVENOUS at 17:07

## 2024-04-04 RX ADMIN — IPRATROPIUM BROMIDE AND ALBUTEROL SULFATE 1 DOSE: 2.5; .5 SOLUTION RESPIRATORY (INHALATION) at 13:26

## 2024-04-04 RX ADMIN — SODIUM CHLORIDE 1000 ML: 9 INJECTION, SOLUTION INTRAVENOUS at 18:14

## 2024-04-04 RX ADMIN — LEVOFLOXACIN 750 MG: 5 INJECTION, SOLUTION INTRAVENOUS at 17:16

## 2024-04-04 RX ADMIN — TRAZODONE HYDROCHLORIDE 150 MG: 50 TABLET ORAL at 21:32

## 2024-04-04 RX ADMIN — METHYLPREDNISOLONE SODIUM SUCCINATE 125 MG: 125 INJECTION INTRAMUSCULAR; INTRAVENOUS at 23:46

## 2024-04-04 RX ADMIN — SODIUM CHLORIDE, PRESERVATIVE FREE 10 ML: 5 INJECTION INTRAVENOUS at 23:45

## 2024-04-04 RX ADMIN — SODIUM CHLORIDE 1000 ML: 9 INJECTION, SOLUTION INTRAVENOUS at 19:21

## 2024-04-04 RX ADMIN — SODIUM CHLORIDE 1000 ML: 9 INJECTION, SOLUTION INTRAVENOUS at 17:12

## 2024-04-04 RX ADMIN — DEXAMETHASONE SODIUM PHOSPHATE 4 MG: 4 INJECTION INTRA-ARTICULAR; INTRALESIONAL; INTRAMUSCULAR; INTRAVENOUS; SOFT TISSUE at 14:45

## 2024-04-04 ASSESSMENT — LIFESTYLE VARIABLES
HOW MANY STANDARD DRINKS CONTAINING ALCOHOL DO YOU HAVE ON A TYPICAL DAY: PATIENT DOES NOT DRINK
HOW OFTEN DO YOU HAVE A DRINK CONTAINING ALCOHOL: NEVER
HOW OFTEN DO YOU HAVE A DRINK CONTAINING ALCOHOL: NEVER

## 2024-04-04 NOTE — ED NOTES
Adult Non-Invasive Positive Pressure Ventilation for Acute Respiratory Distress  Patient & Family Education Note     Patient: Cheyenne Garvey  Age: 73 y.o.     The patient and/or family has been educated on the following items and have verbalized understanding and agreement:    [x]Patient and/or family have been educated on the purpose and advantages of NIV and have verbalized understanding and agreement.  [x]Patient and/or family is in agreement that the patient will be NPO (Nothing by Mouth) for the duration of NIV use.  [x]Patient and/or  family is in agreement that NIV will not be routinely disrupted except to complete oral care.  [x]Patient and/or family have been educated on the level of care, vital signs frequency and monitoring requirements for NIV and are in agreement.  [x]Patient and/or family have been educated on reporting any nausea, chest discomfort, sudden increase in shortness of breath, or a severe headache to the staff immediately.

## 2024-04-04 NOTE — H&P
04/04/2024 01:32 PM    AGRATIO 1.4 03/04/2024 07:29 AM    LABGLOM >90 04/04/2024 01:32 PM    GLUCOSE 143 04/04/2024 01:32 PM    PROT 6.5 03/04/2024 07:29 AM    LABALBU 3.8 03/04/2024 07:29 AM    CALCIUM 9.6 04/04/2024 01:32 PM    BILITOT 0.5 03/04/2024 07:29 AM    ALKPHOS 108 03/04/2024 07:29 AM    AST 17 03/04/2024 07:29 AM    ALT 23 03/04/2024 07:29 AM     BMP:    Lab Results   Component Value Date/Time     04/04/2024 01:32 PM    K 4.6 04/04/2024 01:32 PM     04/04/2024 01:32 PM    CO2 26 04/04/2024 01:32 PM    BUN 14 04/04/2024 01:32 PM    LABALBU 3.8 03/04/2024 07:29 AM    CREATININE 0.6 04/04/2024 01:32 PM    CALCIUM 9.6 04/04/2024 01:32 PM    LABGLOM >90 04/04/2024 01:32 PM    GLUCOSE 143 04/04/2024 01:32 PM     VBG:    Lab Results   Component Value Date/Time    PHVEN 7.382 04/04/2024 02:28 PM    YKA5FIL 43.4 04/04/2024 02:28 PM    S9IYWGSX 86.1 04/04/2024 02:28 PM       ASSESSMENT:      Patient Active Problem List   Diagnosis    Psychophysiologic insomnia    Fibromyalgia    Recurrent major depression in partial remission (HCC)    Stage 3 severe COPD by GOLD classification ([facility])    Osteoporosis    Abnormal magnetic resonance imaging of thoracic spine    H/O: liver disease    Lung mass    Nicotine dependence    Parotitis not due to mumps    Parotid gland fullness    Bilateral lower extremity edema    Centrilobular emphysema (HCC)    COPD exacerbation ([facility])           Code Status:  FULL CODE  OARRS--4.4.2024---by report---[-]    PLAN:     COPD exacerbation---bronchitis---Levaquin [PCN allergy]----O2---med nebs--IV steroids    BiPAP cont'd initial setting---50%--12--6--14   Elevated lactic acid level---IVF--sepsis regimen---recheck   Tobacco---nicotine patch    Home medications reviewed  6.    See orders     Note that over 55 minutes was spent in evaluation of the patient, review of the chart and pertinent records, discussion with family/staff, etc    Nael Kim MD MD  4:04 PM  4/4/2024

## 2024-04-04 NOTE — ED PROVIDER NOTES
Wheezing or Shortness of Breath    ASPIRIN 81 MG EC TABLET    Take 1 tablet by mouth daily    AZITHROMYCIN (ZITHROMAX) 250 MG TABLET    Take 1 tablet by mouth daily    CALCIUM CARBONATE (OSCAL) 500 MG TABS TABLET    Take 1 tablet by mouth daily    CHOLECALCIFEROL (VITAMIN D PO)    Take 500 mg by mouth daily    CINNAMON 500 MG CAPS    Take by mouth    CLONAZEPAM (KLONOPIN) 0.25 MG DISINTEGRATING TABLET    Take 1 tablet by mouth 2 times daily as needed.    ELDERBERRY PO    Take by mouth     FOLIC ACID (FOLVITE) 1 MG TABLET    Take 1 tablet by mouth daily    LAMOTRIGINE (LAMICTAL) 100 MG TABLET    Take 1 tablet by mouth daily    MAGNESIUM PO    Take by mouth    OMEPRAZOLE (PRILOSEC) 20 MG DELAYED RELEASE CAPSULE    Take 45 capsules by mouth Daily    PREDNISONE (DELTASONE) 20 MG TABLET    Take 1 tablet by mouth daily    PYRIDOXINE HCL (VITAMIN B-6) 100 MG TABLET    Take 1 tablet by mouth daily    TIOTROPIUM-OLODATEROL (STIOLTO RESPIMAT) 2.5-2.5 MCG/ACT AERS    Inhale 2 puffs into the lungs daily    TRAZODONE (DESYREL) 150 MG TABLET    Take 1 tablet by mouth nightly    VENTOLIN  (90 BASE) MCG/ACT INHALER    USE 2 INHALATIONS FOUR TIMES A DAY AS NEEDED FOR WHEEZING OR SHORTNESS OF BREATH    VITAMIN C (ASCORBIC ACID) 500 MG TABLET    Take 1 tablet by mouth daily       ALLERGIES     is allergic to amoxicillin, hydrochlorothiazide, and adhesive tape.    FAMILY HISTORY     She indicated that her mother is . She indicated that her father is . She indicated that the status of her maternal grandmother is unknown. She indicated that the status of her other is unknown.     family history includes COPD in her father and mother; Cancer in an other family member; Diabetes in her maternal grandmother; Heart Disease in her father and mother; Memory Loss in her father and mother.    SOCIAL HISTORY      reports that she has been smoking cigarettes. She started smoking about 48 years ago. She has a 48.7 pack-year

## 2024-04-05 ENCOUNTER — APPOINTMENT (OUTPATIENT)
Dept: GENERAL RADIOLOGY | Age: 74
DRG: 192 | End: 2024-04-05
Payer: MEDICARE

## 2024-04-05 PROBLEM — T17.928A ASPIRATION OF FOOD: Status: ACTIVE | Noted: 2024-04-05
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
## 2024-04-05 LAB
ANION GAP SERPL CALCULATED.3IONS-SCNC: 8 MMOL/L (ref 9–17)
BASOPHILS # BLD: <0.03 K/UL (ref 0–0.2)
BASOPHILS NFR BLD: 0 % (ref 0–2)
BUN SERPL-MCNC: 14 MG/DL (ref 8–23)
BUN/CREAT SERPL: 23 (ref 9–20)
CALCIUM SERPL-MCNC: 9.1 MG/DL (ref 8.6–10.4)
CHLORIDE SERPL-SCNC: 107 MMOL/L (ref 98–107)
CO2 SERPL-SCNC: 25 MMOL/L (ref 20–31)
CREAT SERPL-MCNC: 0.6 MG/DL (ref 0.5–0.9)
EOSINOPHIL # BLD: <0.03 K/UL (ref 0–0.44)
EOSINOPHILS RELATIVE PERCENT: 0 % (ref 1–4)
ERYTHROCYTE [DISTWIDTH] IN BLOOD BY AUTOMATED COUNT: 13.5 % (ref 11.8–14.4)
GFR SERPL CREATININE-BSD FRML MDRD: >90 ML/MIN/1.73M2
GLUCOSE BLD-MCNC: 135 MG/DL (ref 65–105)
GLUCOSE BLD-MCNC: 143 MG/DL (ref 65–105)
GLUCOSE BLD-MCNC: 147 MG/DL (ref 65–105)
GLUCOSE BLD-MCNC: 192 MG/DL (ref 65–105)
GLUCOSE SERPL-MCNC: 137 MG/DL (ref 70–99)
HCT VFR BLD AUTO: 38.4 % (ref 36.3–47.1)
HGB BLD-MCNC: 12.6 G/DL (ref 11.9–15.1)
IMM GRANULOCYTES # BLD AUTO: <0.03 K/UL (ref 0–0.3)
IMM GRANULOCYTES NFR BLD: 0 %
LYMPHOCYTES NFR BLD: 0.78 K/UL (ref 1.1–3.7)
LYMPHOCYTES RELATIVE PERCENT: 12 % (ref 24–43)
MCH RBC QN AUTO: 29.6 PG (ref 25.2–33.5)
MCHC RBC AUTO-ENTMCNC: 32.8 G/DL (ref 25.2–33.5)
MCV RBC AUTO: 90.1 FL (ref 82.6–102.9)
MONOCYTES NFR BLD: 0.07 K/UL (ref 0.1–1.2)
MONOCYTES NFR BLD: 1 % (ref 3–12)
NEUTROPHILS NFR BLD: 86 % (ref 36–65)
NEUTS SEG NFR BLD: 5.64 K/UL (ref 1.5–8.1)
NRBC BLD-RTO: 0 PER 100 WBC
PLATELET # BLD AUTO: 252 K/UL (ref 138–453)
PMV BLD AUTO: 9.4 FL (ref 8.1–13.5)
POTASSIUM SERPL-SCNC: 4.6 MMOL/L (ref 3.7–5.3)
RBC # BLD AUTO: 4.26 M/UL (ref 3.95–5.11)
SODIUM SERPL-SCNC: 140 MMOL/L (ref 135–144)
TSH SERPL DL<=0.05 MIU/L-ACNC: 0.59 UIU/ML (ref 0.3–5)
WBC OTHER # BLD: 6.5 K/UL (ref 3.5–11.3)

## 2024-04-05 PROCEDURE — 6370000000 HC RX 637 (ALT 250 FOR IP): Performed by: INTERNAL MEDICINE

## 2024-04-05 PROCEDURE — 2580000003 HC RX 258: Performed by: INTERNAL MEDICINE

## 2024-04-05 PROCEDURE — 94640 AIRWAY INHALATION TREATMENT: CPT

## 2024-04-05 PROCEDURE — 80048 BASIC METABOLIC PNL TOTAL CA: CPT

## 2024-04-05 PROCEDURE — 99231 SBSQ HOSP IP/OBS SF/LOW 25: CPT | Performed by: INTERNAL MEDICINE

## 2024-04-05 PROCEDURE — 2700000000 HC OXYGEN THERAPY PER DAY

## 2024-04-05 PROCEDURE — 6360000002 HC RX W HCPCS: Performed by: INTERNAL MEDICINE

## 2024-04-05 PROCEDURE — 93005 ELECTROCARDIOGRAM TRACING: CPT | Performed by: INTERNAL MEDICINE

## 2024-04-05 PROCEDURE — 85025 COMPLETE CBC W/AUTO DIFF WBC: CPT

## 2024-04-05 PROCEDURE — 94761 N-INVAS EAR/PLS OXIMETRY MLT: CPT

## 2024-04-05 PROCEDURE — 82947 ASSAY GLUCOSE BLOOD QUANT: CPT

## 2024-04-05 PROCEDURE — 84443 ASSAY THYROID STIM HORMONE: CPT

## 2024-04-05 PROCEDURE — 92610 EVALUATE SWALLOWING FUNCTION: CPT

## 2024-04-05 PROCEDURE — 36415 COLL VENOUS BLD VENIPUNCTURE: CPT

## 2024-04-05 PROCEDURE — 2060000000 HC ICU INTERMEDIATE R&B

## 2024-04-05 PROCEDURE — 94660 CPAP INITIATION&MGMT: CPT

## 2024-04-05 PROCEDURE — 71045 X-RAY EXAM CHEST 1 VIEW: CPT

## 2024-04-05 RX ORDER — LEVALBUTEROL INHALATION SOLUTION 1.25 MG/3ML
1.25 SOLUTION RESPIRATORY (INHALATION)
Status: DISCONTINUED | OUTPATIENT
Start: 2024-04-05 | End: 2024-04-07 | Stop reason: HOSPADM

## 2024-04-05 RX ORDER — LEVALBUTEROL INHALATION SOLUTION 1.25 MG/3ML
1.25 SOLUTION RESPIRATORY (INHALATION) EVERY 8 HOURS PRN
Status: DISCONTINUED | OUTPATIENT
Start: 2024-04-05 | End: 2024-04-07 | Stop reason: HOSPADM

## 2024-04-05 RX ORDER — INSULIN LISPRO 100 [IU]/ML
0-4 INJECTION, SOLUTION INTRAVENOUS; SUBCUTANEOUS
Status: DISCONTINUED | OUTPATIENT
Start: 2024-04-05 | End: 2024-04-07 | Stop reason: HOSPADM

## 2024-04-05 RX ADMIN — SODIUM CHLORIDE, PRESERVATIVE FREE 10 ML: 5 INJECTION INTRAVENOUS at 08:52

## 2024-04-05 RX ADMIN — IPRATROPIUM BROMIDE AND ALBUTEROL SULFATE 1 DOSE: .5; 2.5 SOLUTION RESPIRATORY (INHALATION) at 01:03

## 2024-04-05 RX ADMIN — TRAZODONE HYDROCHLORIDE 150 MG: 50 TABLET ORAL at 20:28

## 2024-04-05 RX ADMIN — PANTOPRAZOLE SODIUM 40 MG: 40 TABLET, DELAYED RELEASE ORAL at 05:13

## 2024-04-05 RX ADMIN — IPRATROPIUM BROMIDE AND ALBUTEROL SULFATE 1 DOSE: .5; 2.5 SOLUTION RESPIRATORY (INHALATION) at 05:24

## 2024-04-05 RX ADMIN — SODIUM CHLORIDE, PRESERVATIVE FREE 10 ML: 5 INJECTION INTRAVENOUS at 08:51

## 2024-04-05 RX ADMIN — SODIUM CHLORIDE, PRESERVATIVE FREE 10 ML: 5 INJECTION INTRAVENOUS at 11:39

## 2024-04-05 RX ADMIN — IPRATROPIUM BROMIDE AND ALBUTEROL SULFATE 1 DOSE: .5; 2.5 SOLUTION RESPIRATORY (INHALATION) at 07:59

## 2024-04-05 RX ADMIN — LAMOTRIGINE 100 MG: 100 TABLET ORAL at 08:48

## 2024-04-05 RX ADMIN — METHYLPREDNISOLONE SODIUM SUCCINATE 125 MG: 125 INJECTION INTRAMUSCULAR; INTRAVENOUS at 22:35

## 2024-04-05 RX ADMIN — METHYLPREDNISOLONE SODIUM SUCCINATE 125 MG: 125 INJECTION INTRAMUSCULAR; INTRAVENOUS at 16:36

## 2024-04-05 RX ADMIN — CALCIUM 500 MG: 500 TABLET ORAL at 08:49

## 2024-04-05 RX ADMIN — METHYLPREDNISOLONE SODIUM SUCCINATE 125 MG: 125 INJECTION INTRAMUSCULAR; INTRAVENOUS at 11:37

## 2024-04-05 RX ADMIN — LEVALBUTEROL HYDROCHLORIDE 1.25 MG: 1.25 SOLUTION RESPIRATORY (INHALATION) at 12:14

## 2024-04-05 RX ADMIN — LEVALBUTEROL HYDROCHLORIDE 1.25 MG: 1.25 SOLUTION RESPIRATORY (INHALATION) at 15:34

## 2024-04-05 RX ADMIN — SODIUM CHLORIDE, PRESERVATIVE FREE 10 ML: 5 INJECTION INTRAVENOUS at 22:35

## 2024-04-05 RX ADMIN — ENOXAPARIN SODIUM 40 MG: 100 INJECTION SUBCUTANEOUS at 08:47

## 2024-04-05 RX ADMIN — SODIUM CHLORIDE, PRESERVATIVE FREE 10 ML: 5 INJECTION INTRAVENOUS at 05:13

## 2024-04-05 RX ADMIN — METHYLPREDNISOLONE SODIUM SUCCINATE 125 MG: 125 INJECTION INTRAMUSCULAR; INTRAVENOUS at 05:13

## 2024-04-05 RX ADMIN — LEVOFLOXACIN 750 MG: 5 INJECTION, SOLUTION INTRAVENOUS at 16:44

## 2024-04-05 RX ADMIN — ASPIRIN 81 MG: 81 TABLET, COATED ORAL at 20:28

## 2024-04-05 RX ADMIN — LEVALBUTEROL HYDROCHLORIDE 1.25 MG: 1.25 SOLUTION RESPIRATORY (INHALATION) at 20:04

## 2024-04-05 RX ADMIN — FOLIC ACID 1 MG: 1 TABLET ORAL at 08:49

## 2024-04-05 RX ADMIN — Medication 400 MG: at 08:49

## 2024-04-06 ENCOUNTER — APPOINTMENT (OUTPATIENT)
Dept: GENERAL RADIOLOGY | Age: 74
DRG: 192 | End: 2024-04-06
Payer: MEDICARE

## 2024-04-06 LAB
ANION GAP SERPL CALCULATED.3IONS-SCNC: 8 MMOL/L (ref 9–17)
BASOPHILS # BLD: <0.03 K/UL (ref 0–0.2)
BASOPHILS NFR BLD: 0 % (ref 0–2)
BUN SERPL-MCNC: 20 MG/DL (ref 8–23)
BUN/CREAT SERPL: 29 (ref 9–20)
CALCIUM SERPL-MCNC: 9.8 MG/DL (ref 8.6–10.4)
CHLORIDE SERPL-SCNC: 108 MMOL/L (ref 98–107)
CO2 SERPL-SCNC: 27 MMOL/L (ref 20–31)
CREAT SERPL-MCNC: 0.7 MG/DL (ref 0.5–0.9)
EOSINOPHIL # BLD: <0.03 K/UL (ref 0–0.44)
EOSINOPHILS RELATIVE PERCENT: 0 % (ref 1–4)
ERYTHROCYTE [DISTWIDTH] IN BLOOD BY AUTOMATED COUNT: 13.8 % (ref 11.8–14.4)
GFR SERPL CREATININE-BSD FRML MDRD: >90 ML/MIN/1.73M2
GLUCOSE BLD-MCNC: 102 MG/DL (ref 65–105)
GLUCOSE BLD-MCNC: 153 MG/DL (ref 65–105)
GLUCOSE BLD-MCNC: 95 MG/DL (ref 65–105)
GLUCOSE SERPL-MCNC: 140 MG/DL (ref 70–99)
HCT VFR BLD AUTO: 38.9 % (ref 36.3–47.1)
HGB BLD-MCNC: 12.7 G/DL (ref 11.9–15.1)
IMM GRANULOCYTES # BLD AUTO: 0.14 K/UL (ref 0–0.3)
IMM GRANULOCYTES NFR BLD: 1 %
LYMPHOCYTES NFR BLD: 1.16 K/UL (ref 1.1–3.7)
LYMPHOCYTES RELATIVE PERCENT: 6 % (ref 24–43)
MCH RBC QN AUTO: 29.5 PG (ref 25.2–33.5)
MCHC RBC AUTO-ENTMCNC: 32.6 G/DL (ref 25.2–33.5)
MCV RBC AUTO: 90.5 FL (ref 82.6–102.9)
MONOCYTES NFR BLD: 0.88 K/UL (ref 0.1–1.2)
MONOCYTES NFR BLD: 5 % (ref 3–12)
NEUTROPHILS NFR BLD: 88 % (ref 36–65)
NEUTS SEG NFR BLD: 16.1 K/UL (ref 1.5–8.1)
NRBC BLD-RTO: 0 PER 100 WBC
PLATELET # BLD AUTO: 276 K/UL (ref 138–453)
PMV BLD AUTO: 9.1 FL (ref 8.1–13.5)
POTASSIUM SERPL-SCNC: 4.5 MMOL/L (ref 3.7–5.3)
RBC # BLD AUTO: 4.3 M/UL (ref 3.95–5.11)
SODIUM SERPL-SCNC: 143 MMOL/L (ref 135–144)
WBC OTHER # BLD: 18.3 K/UL (ref 3.5–11.3)

## 2024-04-06 PROCEDURE — 6370000000 HC RX 637 (ALT 250 FOR IP): Performed by: INTERNAL MEDICINE

## 2024-04-06 PROCEDURE — 6360000002 HC RX W HCPCS: Performed by: FAMILY MEDICINE

## 2024-04-06 PROCEDURE — 94660 CPAP INITIATION&MGMT: CPT

## 2024-04-06 PROCEDURE — 2060000000 HC ICU INTERMEDIATE R&B

## 2024-04-06 PROCEDURE — 36415 COLL VENOUS BLD VENIPUNCTURE: CPT

## 2024-04-06 PROCEDURE — 80048 BASIC METABOLIC PNL TOTAL CA: CPT

## 2024-04-06 PROCEDURE — 6360000002 HC RX W HCPCS

## 2024-04-06 PROCEDURE — 82947 ASSAY GLUCOSE BLOOD QUANT: CPT

## 2024-04-06 PROCEDURE — 85025 COMPLETE CBC W/AUTO DIFF WBC: CPT

## 2024-04-06 PROCEDURE — 2580000003 HC RX 258: Performed by: INTERNAL MEDICINE

## 2024-04-06 PROCEDURE — 71046 X-RAY EXAM CHEST 2 VIEWS: CPT

## 2024-04-06 PROCEDURE — 94760 N-INVAS EAR/PLS OXIMETRY 1: CPT

## 2024-04-06 PROCEDURE — 94640 AIRWAY INHALATION TREATMENT: CPT

## 2024-04-06 PROCEDURE — 6360000002 HC RX W HCPCS: Performed by: INTERNAL MEDICINE

## 2024-04-06 PROCEDURE — 2700000000 HC OXYGEN THERAPY PER DAY

## 2024-04-06 RX ORDER — METHYLPREDNISOLONE SODIUM SUCCINATE 125 MG/2ML
60 INJECTION, POWDER, LYOPHILIZED, FOR SOLUTION INTRAMUSCULAR; INTRAVENOUS EVERY 8 HOURS
Status: DISCONTINUED | OUTPATIENT
Start: 2024-04-06 | End: 2024-04-07 | Stop reason: HOSPADM

## 2024-04-06 RX ORDER — METHYLPREDNISOLONE SODIUM SUCCINATE 125 MG/2ML
60 INJECTION, POWDER, LYOPHILIZED, FOR SOLUTION INTRAMUSCULAR; INTRAVENOUS EVERY 6 HOURS
Status: DISCONTINUED | OUTPATIENT
Start: 2024-04-06 | End: 2024-04-06

## 2024-04-06 RX ADMIN — LEVALBUTEROL HYDROCHLORIDE 1.25 MG: 1.25 SOLUTION RESPIRATORY (INHALATION) at 19:54

## 2024-04-06 RX ADMIN — METHYLPREDNISOLONE SODIUM SUCCINATE 60 MG: 125 INJECTION INTRAMUSCULAR; INTRAVENOUS at 19:21

## 2024-04-06 RX ADMIN — LEVALBUTEROL HYDROCHLORIDE 1.25 MG: 1.25 SOLUTION RESPIRATORY (INHALATION) at 08:25

## 2024-04-06 RX ADMIN — LEVALBUTEROL HYDROCHLORIDE 1.25 MG: 1.25 SOLUTION RESPIRATORY (INHALATION) at 23:33

## 2024-04-06 RX ADMIN — CALCIUM 500 MG: 500 TABLET ORAL at 09:34

## 2024-04-06 RX ADMIN — LEVALBUTEROL HYDROCHLORIDE 1.25 MG: 1.25 SOLUTION RESPIRATORY (INHALATION) at 16:37

## 2024-04-06 RX ADMIN — LEVALBUTEROL HYDROCHLORIDE 1.25 MG: 1.25 SOLUTION RESPIRATORY (INHALATION) at 03:51

## 2024-04-06 RX ADMIN — ASPIRIN 81 MG: 81 TABLET, COATED ORAL at 22:04

## 2024-04-06 RX ADMIN — SODIUM CHLORIDE, PRESERVATIVE FREE 10 ML: 5 INJECTION INTRAVENOUS at 09:35

## 2024-04-06 RX ADMIN — METHYLPREDNISOLONE SODIUM SUCCINATE 60 MG: 125 INJECTION INTRAMUSCULAR; INTRAVENOUS at 05:01

## 2024-04-06 RX ADMIN — LEVOFLOXACIN 750 MG: 5 INJECTION, SOLUTION INTRAVENOUS at 16:36

## 2024-04-06 RX ADMIN — Medication 400 MG: at 09:34

## 2024-04-06 RX ADMIN — LAMOTRIGINE 100 MG: 100 TABLET ORAL at 09:34

## 2024-04-06 RX ADMIN — TRAZODONE HYDROCHLORIDE 150 MG: 50 TABLET ORAL at 22:04

## 2024-04-06 RX ADMIN — SODIUM CHLORIDE, PRESERVATIVE FREE 10 ML: 5 INJECTION INTRAVENOUS at 22:04

## 2024-04-06 RX ADMIN — PANTOPRAZOLE SODIUM 40 MG: 40 TABLET, DELAYED RELEASE ORAL at 05:01

## 2024-04-06 RX ADMIN — LEVALBUTEROL HYDROCHLORIDE 1.25 MG: 1.25 SOLUTION RESPIRATORY (INHALATION) at 12:27

## 2024-04-06 RX ADMIN — FOLIC ACID 1 MG: 1 TABLET ORAL at 09:34

## 2024-04-06 RX ADMIN — SODIUM CHLORIDE, PRESERVATIVE FREE 10 ML: 5 INJECTION INTRAVENOUS at 05:01

## 2024-04-06 RX ADMIN — ENOXAPARIN SODIUM 40 MG: 100 INJECTION SUBCUTANEOUS at 09:34

## 2024-04-06 RX ADMIN — METHYLPREDNISOLONE SODIUM SUCCINATE 60 MG: 125 INJECTION INTRAMUSCULAR; INTRAVENOUS at 11:06

## 2024-04-06 RX ADMIN — LEVALBUTEROL HYDROCHLORIDE 1.25 MG: 1.25 SOLUTION RESPIRATORY (INHALATION) at 01:43

## 2024-04-06 NOTE — FLOWSHEET NOTE
Lungs with rhonchi throughout, occasional expiratory wheezes, respiration even and regular, requests to take off BIPAP. Aerosol give will replace NC when therapy completed

## 2024-04-07 ENCOUNTER — APPOINTMENT (OUTPATIENT)
Dept: GENERAL RADIOLOGY | Age: 74
DRG: 192 | End: 2024-04-07
Payer: MEDICARE

## 2024-04-07 VITALS
BODY MASS INDEX: 34.14 KG/M2 | HEART RATE: 92 BPM | WEIGHT: 192.7 LBS | TEMPERATURE: 97.5 F | SYSTOLIC BLOOD PRESSURE: 152 MMHG | DIASTOLIC BLOOD PRESSURE: 75 MMHG | RESPIRATION RATE: 25 BRPM | HEIGHT: 63 IN | OXYGEN SATURATION: 90 %

## 2024-04-07 LAB
ANION GAP SERPL CALCULATED.3IONS-SCNC: 7 MMOL/L (ref 9–17)
BASOPHILS # BLD: <0.03 K/UL (ref 0–0.2)
BASOPHILS NFR BLD: 0 % (ref 0–2)
BUN SERPL-MCNC: 23 MG/DL (ref 8–23)
BUN/CREAT SERPL: 33 (ref 9–20)
CALCIUM SERPL-MCNC: 9.4 MG/DL (ref 8.6–10.4)
CHLORIDE SERPL-SCNC: 106 MMOL/L (ref 98–107)
CO2 SERPL-SCNC: 28 MMOL/L (ref 20–31)
CREAT SERPL-MCNC: 0.7 MG/DL (ref 0.5–0.9)
EKG ATRIAL RATE: 95 BPM
EKG P AXIS: 76 DEGREES
EKG P-R INTERVAL: 190 MS
EKG Q-T INTERVAL: 348 MS
EKG QRS DURATION: 96 MS
EKG QTC CALCULATION (BAZETT): 437 MS
EKG R AXIS: 24 DEGREES
EKG T AXIS: 70 DEGREES
EKG VENTRICULAR RATE: 95 BPM
EOSINOPHIL # BLD: <0.03 K/UL (ref 0–0.44)
EOSINOPHILS RELATIVE PERCENT: 0 % (ref 1–4)
ERYTHROCYTE [DISTWIDTH] IN BLOOD BY AUTOMATED COUNT: 13.7 % (ref 11.8–14.4)
GFR SERPL CREATININE-BSD FRML MDRD: >90 ML/MIN/1.73M2
GLUCOSE BLD-MCNC: 98 MG/DL (ref 65–105)
GLUCOSE SERPL-MCNC: 145 MG/DL (ref 70–99)
HCT VFR BLD AUTO: 39.6 % (ref 36.3–47.1)
HGB BLD-MCNC: 13 G/DL (ref 11.9–15.1)
IMM GRANULOCYTES # BLD AUTO: 0.17 K/UL (ref 0–0.3)
IMM GRANULOCYTES NFR BLD: 1 %
LYMPHOCYTES NFR BLD: 0.98 K/UL (ref 1.1–3.7)
LYMPHOCYTES RELATIVE PERCENT: 7 % (ref 24–43)
MCH RBC QN AUTO: 29.5 PG (ref 25.2–33.5)
MCHC RBC AUTO-ENTMCNC: 32.8 G/DL (ref 25.2–33.5)
MCV RBC AUTO: 90 FL (ref 82.6–102.9)
MONOCYTES NFR BLD: 0.37 K/UL (ref 0.1–1.2)
MONOCYTES NFR BLD: 2 % (ref 3–12)
NEUTROPHILS NFR BLD: 90 % (ref 36–65)
NEUTS SEG NFR BLD: 13.59 K/UL (ref 1.5–8.1)
NRBC BLD-RTO: 0 PER 100 WBC
PLATELET # BLD AUTO: 282 K/UL (ref 138–453)
PMV BLD AUTO: 9.4 FL (ref 8.1–13.5)
POTASSIUM SERPL-SCNC: 4.7 MMOL/L (ref 3.7–5.3)
RBC # BLD AUTO: 4.4 M/UL (ref 3.95–5.11)
SODIUM SERPL-SCNC: 141 MMOL/L (ref 135–144)
WBC OTHER # BLD: 15.1 K/UL (ref 3.5–11.3)

## 2024-04-07 PROCEDURE — 80048 BASIC METABOLIC PNL TOTAL CA: CPT

## 2024-04-07 PROCEDURE — 6360000002 HC RX W HCPCS: Performed by: INTERNAL MEDICINE

## 2024-04-07 PROCEDURE — 99238 HOSP IP/OBS DSCHRG MGMT 30/<: CPT | Performed by: FAMILY MEDICINE

## 2024-04-07 PROCEDURE — 94640 AIRWAY INHALATION TREATMENT: CPT

## 2024-04-07 PROCEDURE — 94761 N-INVAS EAR/PLS OXIMETRY MLT: CPT

## 2024-04-07 PROCEDURE — 2700000000 HC OXYGEN THERAPY PER DAY

## 2024-04-07 PROCEDURE — 6370000000 HC RX 637 (ALT 250 FOR IP): Performed by: INTERNAL MEDICINE

## 2024-04-07 PROCEDURE — 85025 COMPLETE CBC W/AUTO DIFF WBC: CPT

## 2024-04-07 PROCEDURE — 71045 X-RAY EXAM CHEST 1 VIEW: CPT

## 2024-04-07 PROCEDURE — 6360000002 HC RX W HCPCS: Performed by: FAMILY MEDICINE

## 2024-04-07 PROCEDURE — 2580000003 HC RX 258: Performed by: INTERNAL MEDICINE

## 2024-04-07 PROCEDURE — 82947 ASSAY GLUCOSE BLOOD QUANT: CPT

## 2024-04-07 PROCEDURE — 6370000000 HC RX 637 (ALT 250 FOR IP): Performed by: FAMILY MEDICINE

## 2024-04-07 PROCEDURE — 36415 COLL VENOUS BLD VENIPUNCTURE: CPT

## 2024-04-07 PROCEDURE — 94660 CPAP INITIATION&MGMT: CPT

## 2024-04-07 RX ORDER — LEVOFLOXACIN 750 MG/1
750 TABLET, FILM COATED ORAL DAILY
Qty: 5 TABLET | Refills: 0 | Status: SHIPPED | OUTPATIENT
Start: 2024-04-07 | End: 2024-04-12

## 2024-04-07 RX ORDER — NICOTINE 21 MG/24HR
1 PATCH, TRANSDERMAL 24 HOURS TRANSDERMAL DAILY
Qty: 30 PATCH | Refills: 0 | Status: SHIPPED | OUTPATIENT
Start: 2024-04-08

## 2024-04-07 RX ORDER — LEVOFLOXACIN 250 MG/1
750 TABLET, FILM COATED ORAL ONCE
Status: COMPLETED | OUTPATIENT
Start: 2024-04-07 | End: 2024-04-07

## 2024-04-07 RX ORDER — PREDNISONE 10 MG/1
TABLET ORAL
Qty: 16 TABLET | Refills: 0 | Status: SHIPPED | OUTPATIENT
Start: 2024-04-07 | End: 2024-04-17

## 2024-04-07 RX ADMIN — PANTOPRAZOLE SODIUM 40 MG: 40 TABLET, DELAYED RELEASE ORAL at 05:29

## 2024-04-07 RX ADMIN — CALCIUM 500 MG: 500 TABLET ORAL at 08:21

## 2024-04-07 RX ADMIN — LEVOFLOXACIN 750 MG: 250 TABLET, FILM COATED ORAL at 13:39

## 2024-04-07 RX ADMIN — Medication 400 MG: at 08:21

## 2024-04-07 RX ADMIN — LEVALBUTEROL HYDROCHLORIDE 1.25 MG: 1.25 SOLUTION RESPIRATORY (INHALATION) at 03:25

## 2024-04-07 RX ADMIN — ENOXAPARIN SODIUM 40 MG: 100 INJECTION SUBCUTANEOUS at 08:23

## 2024-04-07 RX ADMIN — METHYLPREDNISOLONE SODIUM SUCCINATE 60 MG: 125 INJECTION INTRAMUSCULAR; INTRAVENOUS at 12:51

## 2024-04-07 RX ADMIN — LEVALBUTEROL HYDROCHLORIDE 1.25 MG: 1.25 SOLUTION RESPIRATORY (INHALATION) at 08:53

## 2024-04-07 RX ADMIN — METHYLPREDNISOLONE SODIUM SUCCINATE 60 MG: 125 INJECTION INTRAMUSCULAR; INTRAVENOUS at 02:49

## 2024-04-07 RX ADMIN — FOLIC ACID 1 MG: 1 TABLET ORAL at 08:21

## 2024-04-07 RX ADMIN — SODIUM CHLORIDE, PRESERVATIVE FREE 10 ML: 5 INJECTION INTRAVENOUS at 02:50

## 2024-04-07 RX ADMIN — LAMOTRIGINE 100 MG: 100 TABLET ORAL at 08:21

## 2024-04-07 NOTE — CARE COORDINATION
DISCHARGE BARRIERS       Reason for Referral:  SW completed a Psychosocial Assessment for evaluation of patient's mental health, social status, and functional capacity within the community. Teressa Garner is a 73 y.o. female admitted due to COPD exacerbation (HCC). Patient alone. SW provided supportive listening while patient discussed past medical history and events leading up to hospitalization.       Mental Status:  Alert, oriented, and engaging during assessment.     Decision Making:  Makes own decisions.     Family/Social/Home Environment: lives alone    Employment status: Retired     Health Insurance:     Active Insurance as of 4/4/2024       Primary Coverage       Payor Plan Insurance Group Employer/Plan Group    [employer]AGE [employer] ADVANTAGE PREFERRED PPO [id number]       Payor Plan Address Payor Plan Phone Number Payor Plan Fax Number Effective Dates    [address] [phone]  8/1/2017 - None Entered    [facility] [id number]         Subscriber Name Subscriber Birth Date Member ID       TERESSA GARNER [id number]                     Support: Discussed a good social support network     Current Services:  None      Current DMEs: none reported     PCP: Karlee Graves,  and repots no issues affording medication.      status:    None      ADLs and means of transportation: Independent in ADLs/IADLs in ADLs prior to hospitalization and able to transport self.    Food insecurity or needed financial assistance: Denies any food insecurity or financial concerns at this time.    Income Source: social security    ACP and Code Status:  SW discussed an Advance Directive which included the patient's choices for care and treatment in the case of a health event that adversely affects decision-making abilities. SW provided education and resources. Teressa Garner has no questions at this time and has agreed to keep me up-to-date should anything change.    Teressa L Mare 
Pt resting in bed. Oxygen removed, will leave pt on room air.   
family    Financial    Active Insurance as of 4/4/2024       Primary Coverage       Payor Plan Insurance Group Employer/Plan Group    [employer] MEDIC[employer] ADVANTAGE [employer] ADVANTAGE PREFERRED PPO [id number]       Payor Plan Address Payor Plan Phone Number Payor Plan Fax Number Effective Dates    [address] [phone]  8/1/2017 - None Entered    [facility] [id number]         Subscriber Name Subscriber Birth Date Member ID       CHEYENNE GARNER [id number]                     Does insurance require precert for SNF: Yes    Potential assistance Purchasing Medications: No  Meds-to-Beds request:        [facility] [place], [place] - [address] [facility] -  [phone] - F [phone]  [address] [facility] [address]  Phone: [phone] Fax: [phone]    [facility]  [facility] - [place], [place] - [address] [place] [address] - P [phone] - F [phone]  [address] [place] [address]  Phone: [phone] Fax: [phone]    [facility] - [place], [place] - [address] [place] [address] - P [phone] - F [phone]  [facility] [place] [address]  Phone: [phone] Fax: [phone]      Notes:    Factors facilitating achievement of predicted outcomes: Family support, Motivated, Cooperative, Pleasant, Sense of humor, and Good insight into deficits    Barriers to discharge: none noted    Additional Case Management Notes: Patient planning on returning to current housing without any additional needs    The Plan for Transition of Care is related to the following treatment goals of COPD (chronic obstructive pulmonary disease) (HCC) [J44.9]  COPD exacerbation (HCC) [J44.1]    IF APPLICABLE: The Patient and/or patient representative Cheyenne and her family were provided with a choice of provider and agrees with the discharge plan. Freedom of choice list with basic dialogue that supports the patient's individualized plan of care/goals and

## 2024-04-07 NOTE — PLAN OF CARE
Problem: Discharge Planning  Goal: Discharge to home or other facility with appropriate resources  4/5/2024 0844 by Jerilyn Garcia RN  Outcome: Progressing  4/5/2024 0308 by Zoie Russo RN  Outcome: Progressing     Problem: Safety - Adult  Goal: Free from fall injury  4/5/2024 0844 by Jerilyn Garcia RN  Outcome: Progressing  4/5/2024 0308 by Zoie Russo RN  Outcome: Progressing     Problem: ABCDS Injury Assessment  Goal: Absence of physical injury  4/5/2024 0844 by Jerilyn Garcia RN  Outcome: Progressing  4/5/2024 0308 by Zoie Russo RN  Outcome: Progressing     Problem: Respiratory - Adult  Goal: Achieves optimal ventilation and oxygenation  4/5/2024 0844 by Jerilyn Garcia RN  Outcome: Progressing  4/5/2024 0308 by Zoie Russo RN  Outcome: Progressing     Problem: Cardiovascular - Adult  Goal: Maintains optimal cardiac output and hemodynamic stability  4/5/2024 0844 by Jerilyn Garcia RN  Outcome: Progressing  4/5/2024 0308 by Zoie Russo RN  Outcome: Progressing  Goal: Absence of cardiac dysrhythmias or at baseline  4/5/2024 0844 by Jerilyn Garcia RN  Outcome: Progressing  4/5/2024 0308 by Zoie Russo RN  Outcome: Progressing     Problem: Chronic Conditions and Co-morbidities  Goal: Patient's chronic conditions and co-morbidity symptoms are monitored and maintained or improved  4/5/2024 0844 by Jerilyn Garcia RN  Outcome: Progressing  4/5/2024 0308 by Zoie Russo RN  Outcome: Progressing     Problem: Skin/Tissue Integrity  Goal: Absence of new skin breakdown  Description: 1.  Monitor for areas of redness and/or skin breakdown  2.  Assess vascular access sites hourly  3.  Every 4-6 hours minimum:  Change oxygen saturation probe site  4.  Every 4-6 hours:  If on nasal continuous positive airway pressure, respiratory therapy assess nares and determine need for appliance change or resting period.  4/5/2024 0844 by Jerilyn Garcia RN  Outcome: Progressing  4/5/2024 0308 by 
  Problem: Discharge Planning  Goal: Discharge to home or other facility with appropriate resources  4/5/2024 2042 by Jennie Orourke RN  Outcome: Progressing  Flowsheets  Taken 4/5/2024 2030 by Jennie Orourke RN  Discharge to home or other facility with appropriate resources:   Identify barriers to discharge with patient and caregiver   Refer to discharge planning if patient needs post-hospital services based on physician order or complex needs related to functional status, cognitive ability or social support system  Taken 4/5/2024 0845 by Jerilyn Garcia RN  Discharge to home or other facility with appropriate resources:   Identify barriers to discharge with patient and caregiver   Arrange for needed discharge resources and transportation as appropriate   Identify discharge learning needs (meds, wound care, etc)   Refer to discharge planning if patient needs post-hospital services based on physician order or complex needs related to functional status, cognitive ability or social support system  4/5/2024 0844 by Jerilyn Garcia RN  Outcome: Progressing     Problem: Safety - Adult  Goal: Free from fall injury  4/5/2024 2042 by Jennie Orourke RN  Outcome: Progressing  4/5/2024 0844 by Jerilyn Garcia RN  Outcome: Progressing     Problem: ABCDS Injury Assessment  Goal: Absence of physical injury  4/5/2024 2042 by Jennie Orourke RN  Outcome: Progressing  4/5/2024 0844 by Jerilyn Gracia RN  Outcome: Progressing     Problem: Respiratory - Adult  Goal: Achieves optimal ventilation and oxygenation  4/5/2024 2042 by Jennie Orourke RN  Outcome: Progressing  Flowsheets (Taken 4/5/2024 2030)  Achieves optimal ventilation and oxygenation:   Assess for changes in respiratory status   Assess for changes in mentation and behavior   Position to facilitate oxygenation and minimize respiratory effort   Oxygen supplementation based on oxygen saturation or arterial blood gases   Assess and instruct to report 
  Problem: Discharge Planning  Goal: Discharge to home or other facility with appropriate resources  Outcome: Progressing  Flowsheets (Taken 4/4/2024 5669)  Discharge to home or other facility with appropriate resources:   Identify barriers to discharge with patient and caregiver   Identify discharge learning needs (meds, wound care, etc)   Refer to discharge planning if patient needs post-hospital services based on physician order or complex needs related to functional status, cognitive ability or social support system     Problem: Safety - Adult  Goal: Free from fall injury  Outcome: Progressing     Problem: ABCDS Injury Assessment  Goal: Absence of physical injury  Outcome: Progressing     Problem: Respiratory - Adult  Goal: Achieves optimal ventilation and oxygenation  Outcome: Progressing     Problem: Cardiovascular - Adult  Goal: Maintains optimal cardiac output and hemodynamic stability  Outcome: Progressing  Goal: Absence of cardiac dysrhythmias or at baseline  Outcome: Progressing     Problem: Chronic Conditions and Co-morbidities  Goal: Patient's chronic conditions and co-morbidity symptoms are monitored and maintained or improved  Outcome: Progressing     
to report shortness of breath or any respiratory difficulty   Respiratory therapy support as indicated     Problem: Cardiovascular - Adult  Goal: Maintains optimal cardiac output and hemodynamic stability  Outcome: Progressing  Flowsheets  Taken 4/6/2024 1949 by Jennie Orourke RN  Maintains optimal cardiac output and hemodynamic stability:   Monitor blood pressure and heart rate   Monitor urine output and notify Licensed Independent Practitioner for values outside of normal range   Assess for signs of decreased cardiac output  Taken 4/6/2024 0945 by Delphine Gruber RN  Maintains optimal cardiac output and hemodynamic stability:   Monitor blood pressure and heart rate   Monitor urine output and notify Licensed Independent Practitioner for values outside of normal range   Assess for signs of decreased cardiac output  Goal: Absence of cardiac dysrhythmias or at baseline  Outcome: Progressing  Flowsheets  Taken 4/6/2024 1949 by Jennie Orourke RN  Absence of cardiac dysrhythmias or at baseline:   Monitor cardiac rate and rhythm   Assess for signs of decreased cardiac output  Taken 4/6/2024 0945 by Delphine Gruber RN  Absence of cardiac dysrhythmias or at baseline:   Monitor cardiac rate and rhythm   Assess for signs of decreased cardiac output     Problem: Chronic Conditions and Co-morbidities  Goal: Patient's chronic conditions and co-morbidity symptoms are monitored and maintained or improved  Outcome: Progressing  Flowsheets  Taken 4/6/2024 1949 by Jennie Orourke RN  Care Plan - Patient's Chronic Conditions and Co-Morbidity Symptoms are Monitored and Maintained or Improved:   Monitor and assess patient's chronic conditions and comorbid symptoms for stability, deterioration, or improvement   Update acute care plan with appropriate goals if chronic or comorbid symptoms are exacerbated and prevent overall improvement and discharge  Taken 4/6/2024 0945 by Delphine Gruber RN  Care Plan - Patient's

## 2024-04-07 NOTE — DISCHARGE SUMMARY
in 1 week       Total Time spent with patient and coordinating care:  30 minutes    Billy Jensen MD  4/7/2024  11:31 AM

## 2024-04-07 NOTE — PROGRESS NOTES
rounding on PCU.    Assessment: Patient says she is much better than when she came in.  She is very positive about the healing taking place.    Intervention: Engaged in conversation and prayer. Patient expressed gratitude for visit and offer of continued prayer.    Plan: Chaplains are available 24/7 to help with spiritual and emotional concerns.       04/05/24 1033   Encounter Summary   Encounter Overview/Reason  Volunteer Encounter   Service Provided For: Patient   Referral/Consult From: Rounding   Support System Family members;Taoist/mei community   Last Encounter  04/05/24   Complexity of Encounter Low   Begin Time 1021   End Time  1038   Total Time Calculated 17 min   Spiritual/Emotional needs   Type Spiritual Support   Assessment/Intervention/Outcome   Assessment Calm   Intervention Active listening;Prayer (assurance of)/[place];Sustaining Presence/Ministry of presence       
1146- Pt resting in bed, Pulse Ox 92% pt on RA. Pt up to stand at side of bed.    1148-pt walking to bathroom, pulse ox 83%. Oxygen applied at 2LNC, pt returned to bed.     1149- Pulse ox 92% on 2LNC, pt sitting on side of bed. Pt having SOB, encouraged slow breaths.     1150- Pt feeling better, pulse ox 95% on 2LNC.    Orders placed for home oxygen. Pt requesting [place]. Orders sent.   
Discharge instructions and medications reviewed with the patient. Patient's pharmacy closed today. One time dose for Levofloxacin PO received from Dr. Jensen given for today's dose. Patient discharged with all her belongings via wheelchair.   
Hospitalist Progress Note    Patient:  Cheyenne Garvey     YOB: 1950    MRN: [id number]   Admit date: 4/4/2024     Acct: [id number]     PCP: Karlee Graves DO    CC--Interval History: COPD exacerbation--bronchitis--on Levaquin--Solumedrol---med nebs----was on BiPAP at 30%--12-6-14 last night and was converted to 2 liters O2 NC---may require BiPAP nights and rest.  Breathing better today--not requiring accessory muscle use today.    CXR---4.5.2024--NACs    Lactic acid--down to 2.0--dc'd--sepsis ruled out.    Taachycardia ---> dc albuterol ----> Xopenex    Coughing and choking with meals---speech evaluation----4.5.2024    See note below     All other ROS negative except noted in HPI    Diet:  ADULT DIET; Regular    Medications:  Scheduled Meds:   levalbuterol  1.25 mg Nebulization Q4H RT    insulin lispro  0-4 Units SubCUTAneous 4x Daily AC & HS    sodium chloride flush  10 mL IntraVENous 2 times per day    enoxaparin  40 mg SubCUTAneous Daily    levofloxacin  750 mg IntraVENous Q24H    methylPREDNISolone  125 mg IntraVENous Q6H    folic acid  1 mg Oral Daily    lamoTRIgine  100 mg Oral Daily    pantoprazole  40 mg Oral QAM AC    traZODone  150 mg Oral Nightly    nicotine  1 patch TransDERmal Daily    calcium elemental  500 mg Oral Daily    magnesium oxide  400 mg Oral Daily    aspirin  81 mg Oral Nightly     Continuous Infusions:   sodium chloride      dextrose       PRN Meds:levalbuterol, sodium chloride flush, sodium chloride, ondansetron **OR** ondansetron, acetaminophen, glucose, dextrose bolus **OR** dextrose bolus, glucagon (rDNA), dextrose    Objective:  Labs:  CBC with Differential:    Lab Results   Component Value Date/Time    WBC 6.5 04/05/2024 05:12 AM    RBC 4.26 04/05/2024 05:12 AM    HGB 12.6 04/05/2024 05:12 AM    HCT 38.4 04/05/2024 05:12 AM     04/05/2024 05:12 AM    MCV 90.1 04/05/2024 05:12 AM    MCH 29.6 04/05/2024 05:12 AM    MCHC 32.8 04/05/2024 05:12 AM    RDW 13.5 
Hospitalist Progress Note  4/6/2024 12:44 PM  Subjective:   Admit Date: 4/4/2024  PCP: Karlee Graves DO    Interval History: Patient with COPdcexacerbation feeling better this morning required Bipap overnight.WBC count up toady likely steroid affect    Diet: ADULT DIET; Regular  Medications:   Scheduled Meds:   methylPREDNISolone  60 mg IntraVENous Q6H    levalbuterol  1.25 mg Nebulization Q4H RT    insulin lispro  0-4 Units SubCUTAneous 4x Daily AC & HS    sodium chloride flush  10 mL IntraVENous 2 times per day    enoxaparin  40 mg SubCUTAneous Daily    levofloxacin  750 mg IntraVENous Q24H    folic acid  1 mg Oral Daily    lamoTRIgine  100 mg Oral Daily    pantoprazole  40 mg Oral QAM AC    traZODone  150 mg Oral Nightly    nicotine  1 patch TransDERmal Daily    calcium elemental  500 mg Oral Daily    magnesium oxide  400 mg Oral Daily    aspirin  81 mg Oral Nightly     Continuous Infusions:   sodium chloride Stopped (04/05/24 1850)    dextrose       CBC:   Recent Labs     04/04/24  1332 04/05/24  0512 04/06/24  0507   WBC 11.3 6.5 18.3*   HGB 13.7 12.6 12.7    252 276     BMP:    Recent Labs     04/04/24  1332 04/05/24  0512 04/06/24  0507    140 143   K 4.6 4.6 4.5    107 108*   CO2 26 25 27   BUN 14 14 20   CREATININE 0.6 0.6 0.7   GLUCOSE 143* 137* 140*     Hepatic: No results for input(s): \"AST\", \"ALT\", \"ALB\", \"BILITOT\", \"ALKPHOS\" in the last 72 hours.  Troponin: No results for input(s): \"TROPONINI\" in the last 72 hours.  BNP: No results for input(s): \"BNP\" in the last 72 hours.  Lipids: No results for input(s): \"CHOL\", \"HDL\" in the last 72 hours.    Invalid input(s): \"LDLCALCU\"  INR: No results for input(s): \"INR\" in the last 72 hours.    Objective:   Vitals: BP (!) 147/63   Pulse (!) 116   Temp 98 °F (36.7 °C) (Temporal)   Resp 26   Ht 1.6 m (5' 3\")   Wt 87.2 kg (192 lb 4.8 oz)   SpO2 93%   BMI 34.06 kg/m²   General appearance: alert and cooperative with exam  HEENT: Eye: 
Hospitalist Progress Note  4/7/2024 10:29 AM  Subjective:   Admit Date: 4/4/2024  PCP: Karlee Graves,     Interval History: Patient with COPD exacerbation is down to 2l nasal canula not needed Bipap last night.WBC count trending down ,CXR looks stable .wants to be discahrged today    Diet: ADULT DIET; Regular  Medications:   Scheduled Meds:   methylPREDNISolone  60 mg IntraVENous Q8H    levalbuterol  1.25 mg Nebulization Q4H RT    insulin lispro  0-4 Units SubCUTAneous 4x Daily AC & HS    sodium chloride flush  10 mL IntraVENous 2 times per day    enoxaparin  40 mg SubCUTAneous Daily    levofloxacin  750 mg IntraVENous Q24H    folic acid  1 mg Oral Daily    lamoTRIgine  100 mg Oral Daily    pantoprazole  40 mg Oral QAM AC    traZODone  150 mg Oral Nightly    nicotine  1 patch TransDERmal Daily    calcium elemental  500 mg Oral Daily    magnesium oxide  400 mg Oral Daily    aspirin  81 mg Oral Nightly     Continuous Infusions:   sodium chloride Stopped (04/05/24 1850)    dextrose       CBC:   Recent Labs     04/05/24  0512 04/06/24  0507 04/07/24  0531   WBC 6.5 18.3* 15.1*   HGB 12.6 12.7 13.0    276 282     BMP:    Recent Labs     04/05/24  0512 04/06/24  0507 04/07/24  0531    143 141   K 4.6 4.5 4.7    108* 106   CO2 25 27 28   BUN 14 20 23   CREATININE 0.6 0.7 0.7   GLUCOSE 137* 140* 145*     Hepatic: No results for input(s): \"AST\", \"ALT\", \"ALB\", \"BILITOT\", \"ALKPHOS\" in the last 72 hours.  Troponin: No results for input(s): \"TROPONINI\" in the last 72 hours.  BNP: No results for input(s): \"BNP\" in the last 72 hours.  Lipids: No results for input(s): \"CHOL\", \"HDL\" in the last 72 hours.    Invalid input(s): \"LDLCALCU\"  INR: No results for input(s): \"INR\" in the last 72 hours.    Objective:   Vitals: BP (!) 151/77   Pulse (!) 116   Temp 97.3 °F (36.3 °C) (Temporal)   Resp 23   Ht 1.6 m (5' 3\")   Wt 87.4 kg (192 lb 11.2 oz)   SpO2 95%   BMI 34.14 kg/m²   General appearance: alert and 
[facility]zy Simms, [facility]. COPD (chronic obstructive pulmonary disease) ([facility]) [J44.9]  COPD exacerbation ([facility]) [J44.1] .   Vitals:    04/04/24 1515   BP: 129/76   Pulse: 99   Resp: 23   Temp:    SpO2: 98%   . Patients home meds are   Prior to Admission medications    Medication Sig Start Date End Date Taking? Authorizing Provider   azithromycin (ZITHROMAX) 250 MG tablet Take 1 tablet by mouth daily 4/3/24  Yes Terri Alexandre MD   predniSONE (DELTASONE) 20 MG tablet Take 1 tablet by mouth daily 4/3/24  Yes Terri Alexandre MD   tiotropium-olodaterol (STIOLTO RESPIMAT) 2.5-2.5 MCG/ACT AERS Inhale 2 puffs into the lungs daily    Terri Alexandre MD   Cinnamon 500 MG CAPS Take by mouth    Terri Alexandre MD   VENTOLIN  (90 Base) MCG/ACT inhaler USE 2 INHALATIONS FOUR TIMES A DAY AS NEEDED FOR WHEEZING OR SHORTNESS OF BREATH 10/4/23   Jane Rodriguez APRN - CNP   albuterol (PROVENTIL) (2.5 MG/3ML) 0.083% nebulizer solution Take 3 mLs by nebulization every 6 hours as needed for Wheezing or Shortness of Breath 2/27/23   Jane Rodriguez APRN - CNP   MAGNESIUM PO Take by mouth    Terri Alexandre MD   ELDERBERRY PO Take by mouth     Terri Alexandre MD   traZODone (DESYREL) 150 MG tablet Take 1 tablet by mouth nightly    Terri Alexandre MD   calcium carbonate (OSCAL) 500 MG TABS tablet Take 1 tablet by mouth daily    Terri Alexandre MD   clonazePAM (KLONOPIN) 0.25 MG disintegrating tablet Take 1 tablet by mouth 2 times daily as needed.    Terri Alexandre MD   lamoTRIgine (LAMICTAL) 100 MG tablet Take 1 tablet by mouth daily    Terri Alexandre MD   Cholecalciferol (VITAMIN D PO) Take 500 mg by mouth daily    Terri Alexandre MD   vitamin C (ASCORBIC ACID) 500 MG tablet Take 1 tablet by mouth daily    Terri Alexandre MD   Pyridoxine HCl (VITAMIN B-6) 100 MG tablet Take 1 tablet by mouth daily    Terri Alexandre MD 
Tara Carreon, [facility]. [facility] ([facility] obstructive pulmonary disease) ([facility]) [J44.9]  COPD exacerbation ([facility]) [J44.1] .   Vitals:    04/05/24 1534   BP:    Pulse: (!) 107   Resp: 27   Temp:    SpO2: 94%   . Patients home meds are   Prior to Admission medications    Medication Sig Start Date End Date Taking? Authorizing Provider   azithromycin (ZITHROMAX) 250 MG tablet Take 1 tablet by mouth daily 4/3/24  Yes Terri Alexandre MD   predniSONE (DELTASONE) 20 MG tablet Take 1 tablet by mouth daily 4/3/24  Yes Terri Alexandre MD   tiotropium-olodaterol (STIOLTO RESPIMAT) 2.5-2.5 MCG/ACT AERS Inhale 2 puffs into the lungs daily    Terri Alexandre MD   Cinnamon 500 MG CAPS Take by mouth    Terri Alexandre MD   VENTOLIN  (90 Base) MCG/ACT inhaler USE 2 INHALATIONS FOUR TIMES A DAY AS NEEDED FOR WHEEZING OR SHORTNESS OF BREATH 10/4/23   Jane Rodriguez APRN - CNP   albuterol (PROVENTIL) (2.5 MG/3ML) 0.083% nebulizer solution Take 3 mLs by nebulization every 6 hours as needed for Wheezing or Shortness of Breath 2/27/23   Jane Rodriguez APRN - CNP   MAGNESIUM PO Take by mouth    Terri Alexandre MD   ELDERBERRY PO Take by mouth     Terri Alexandre MD   traZODone (DESYREL) 150 MG tablet Take 1 tablet by mouth nightly    Terri Alexandre MD   calcium carbonate (OSCAL) 500 MG TABS tablet Take 1 tablet by mouth daily    Terri Alexandre MD   clonazePAM (KLONOPIN) 0.25 MG disintegrating tablet Take 1 tablet by mouth 2 times daily as needed.    Terri Alexandre MD   lamoTRIgine (LAMICTAL) 100 MG tablet Take 1 tablet by mouth daily    Terri Alexandre MD   Cholecalciferol (VITAMIN D PO) Take 500 mg by mouth daily    Terri Alexandre MD   vitamin C (ASCORBIC ACID) 500 MG tablet Take 1 tablet by mouth daily    Terri Alexandre MD   Pyridoxine HCl (VITAMIN B-6) 100 MG tablet Take 1 tablet by mouth daily    Terri Alexandre MD   aspirin 81 
With Ruben, RT, at bedside, patient trialed off bipap. SpO2 98% with 30% FiO2. After approximately 10 minutes on room air, SpO2 still 92%. Bipap left off by RT and nasal cannula applied at 2L. SpO2 94-95% on 2L NC.   Patient denies other needs at this time. Call light in reach.   
1.99 2.17 2.36 2.55 2.75 2.95 3.16 70+ 1.97 2.19 2.41 2.64 2.87 3.12 3.37 3.62             Predicted Peak Expiratory Flow Rate                                       Height (in)  Female       Height (in) Male           Age 56 58 60 62 64 66 68 70 Age            20 344 357 372 387 402 417 432 446  60 62 64 66 68 70 72 74 76   25 337 352 366 381 396 411 426 441 25 447 476 505 533 562 591 619 648 677   30 329 344 359 374 389 404 419 434 30 437 466 494 523 552 580 609 638 667   35 322 337 351 366 381 396 411 426 35 426 455 484 512 541 570 598 627 657   40 314 329 344 359 374 389 404 419 40 416 445 473 502 531 559 588 617 647   45 307 322 336 351 366 381 396 411 45 405 434 463 491 520 549 577 606 636   50 299 314 329 344 359 374 389 404 50 395 424 452 481 510 538 567 596 625   55 292 307 321 336 351 366 381 396 55 384 413 442 470 499 528 556 585 615   60 284 299 314 329 344 359 374 389 60 374 403 431 460 489 517 546 575 605   65 277 292 306 321 336 351 366 381 65 363 392 421 449 478 507 535 564 594   70 269 284 299 314 329 344 359 374 70 353 382 410 439 468 496 525 554 583   75 261 274 289 305 319 334 348 364 75 344 372 400 429 458 487 515 544 573   80 253 266 282 296 312 327 342 356 80 335 362 390 419 448 476 505 534 562             
understanding of education    PATIENT GOALS: [] Pt did not state.  Will further assess during treatment.     [] Return to the least restricted diet possible     [] Return to previous level of function     [x] OTHER: continue with current diet    PLAN / TREATMENT RECOMMENDATIONS:  [] No further speech therapy services indicated.  [] Speech Therapy evaluation to assess speech, language, cognition and/or voice  [x] Recommendations pending MBS  [] Skilled SLP intervention on IP Rehab 1x per day 5 days per week.  [] Skilled SLP intervention on acute care 3-5 x per week.  [] OTHER:      SHORT TERM GOALS:   Complete MBSS    LONG TERM GOALS:   Complete MBSS     Electronically signed by: Katharina Borges M.S., CCC-SLP             Date:4/5/2024

## 2024-04-08 ENCOUNTER — CARE COORDINATION (OUTPATIENT)
Dept: CASE MANAGEMENT | Age: 74
End: 2024-04-08

## 2024-04-08 NOTE — CARE COORDINATION
Care Transitions Initial Follow Up Call    Call within 2 business days of discharge: Yes    Patient Current Location:  Home: [address]   [address]  [place] [address]    Care Transition Nurse contacted the patient by telephone to perform post hospital discharge assessment. Verified name and  with patient as identifiers. Provided introduction to self, and explanation of the Care Transition Nurse role.     Patient: Cheyenne Garvey Patient : 1950   MRN: <[id number]>  Reason for Admission:   Discharge Date: 24 RARS: Readmission Risk Score: 8.3      Last Discharge Facility       Date Complaint Diagnosis Description Type Department Provider    24 Shortness of Breath COPD exacerbation (HCC) ... ED to Hosp-Admission (Discharged) (ADMITTED) Nael Church MD; Sugar Mcgarry...            Was this an external facility discharge? No Discharge Facility: [facility]    Challenges to be reviewed by the provider   Additional needs identified to be addressed with provider: No  none               Method of communication with provider: none.    Writer spoke to patient she is doing well, reviewed discharge instructions and medication, 1111F, patient denied any c/o fever, chills, n/v/d, or chest pain has chronic sob, has home 02, stated she contacted her pcp office in regard to getting some vns, SW ill be getting her set up with [facility] for some services , patient will make her own f/u appt, explained role of cTN provided contact information, will follow//JU    Care Transition Nurse reviewed discharge instructions, medical action plan, and red flags with patient who verbalized understanding. The patient was given an opportunity to ask questions and does not have any further questions or concerns at this time. Were discharge instructions available to patient? Yes. Reviewed appropriate site of care based on symptoms and resources available to patient including:

## 2024-04-09 LAB
MICROORGANISM SPEC CULT: NORMAL
MICROORGANISM SPEC CULT: NORMAL
SERVICE CMNT-IMP: NORMAL
SERVICE CMNT-IMP: NORMAL
SPECIMEN DESCRIPTION: NORMAL
SPECIMEN DESCRIPTION: NORMAL

## 2024-04-12 ENCOUNTER — CARE COORDINATION (OUTPATIENT)
Dept: CASE MANAGEMENT | Age: 74
End: 2024-04-12

## 2024-04-12 NOTE — CARE COORDINATION
Care Transitions Outreach Attempt # 1     Call within 2 business days of discharge: Yes   Attempted to reach patient for transitions of care follow up. Unable to reach patient. Left HIPAA complaint VM requesting a return call.     Patient: Cheyenne Garvey Patient : 1950 MRN: <[id number]>    Last Discharge Facility       Date Complaint Diagnosis Description Type Department Provider    24 Shortness of Breath COPD exacerbation (HCC) ... ED to Hosp-Admission (Discharged) (ADMITTED) [facility] Nael Kang MD; Sugar Mcgarry...              Future Appointments   Date Time Provider Department Center   2024  9:00 AM Heath Hodgson MD [facility]

## 2024-04-15 ENCOUNTER — CARE COORDINATION (OUTPATIENT)
Dept: CASE MANAGEMENT | Age: 74
End: 2024-04-15

## 2024-04-15 NOTE — CARE COORDINATION
Care Transitions Outreach Attempt #2    Attempted to reach patient for transitions of care follow up. Unable to reach patient. HIPAA compliant message left on  requesting a return call. Will end care transitions if no return call received.     Patient: Cheyenne Garvey Patient : 1950 MRN: <[id number]>    Last Discharge Facility       Date Complaint Diagnosis Description Type Department Provider    24 Shortness of Breath COPD exacerbation (HCC) ... ED to Hosp-Admission (Discharged) (ADMITTED) Nael Church MD; Sugar Mcgarry...              Was this an external facility discharge? No Discharge Facility: [facility]    Noted following upcoming appointments from discharge chart review:   [facility] follow up appointment(s):   Future Appointments   Date Time Provider Department Center   2024  9:00 AM Heath Hodgson MD [facility]        Faustina Castellanos LPN  Care Transition Nurse

## 2024-08-21 ENCOUNTER — OFFICE VISIT (OUTPATIENT)
Dept: PULMONOLOGY | Age: 74
End: 2024-08-21
Payer: MEDICARE

## 2024-08-21 VITALS
DIASTOLIC BLOOD PRESSURE: 64 MMHG | OXYGEN SATURATION: 96 % | SYSTOLIC BLOOD PRESSURE: 122 MMHG | RESPIRATION RATE: 14 BRPM | TEMPERATURE: 97.2 F | HEIGHT: 63 IN | BODY MASS INDEX: 36.32 KG/M2 | HEART RATE: 87 BPM | WEIGHT: 205 LBS

## 2024-08-21 DIAGNOSIS — J43.2 CENTRILOBULAR EMPHYSEMA (HCC): ICD-10-CM

## 2024-08-21 DIAGNOSIS — J44.9 STAGE 3 SEVERE COPD BY GOLD CLASSIFICATION (HCC): Primary | ICD-10-CM

## 2024-08-21 DIAGNOSIS — J96.11 CHRONIC RESPIRATORY FAILURE WITH HYPOXIA (HCC): ICD-10-CM

## 2024-08-21 DIAGNOSIS — Z87.891 PERSONAL HISTORY OF TOBACCO USE: ICD-10-CM

## 2024-08-21 PROCEDURE — G0296 VISIT TO DETERM LDCT ELIG: HCPCS | Performed by: INTERNAL MEDICINE

## 2024-08-21 PROCEDURE — 99214 OFFICE O/P EST MOD 30 MIN: CPT | Performed by: INTERNAL MEDICINE

## 2024-08-21 RX ORDER — VALACYCLOVIR HYDROCHLORIDE 1 G/1
TABLET, FILM COATED ORAL
COMMUNITY
Start: 2024-06-11

## 2024-08-21 NOTE — PROGRESS NOTES
REASON FOR THE CONSULTATION:  Chief Complaint   Patient presents with    New Patient     Stage 3 severe COPD by GOLD classification       HISTORY OF PRESENT ILLNESS:    Cheyenne Garvey is a 73 y.o. year old female here for evaluation of   In hospital April 2024  for 3 days . She gets copd ex 2 times a year . Gets more sinus infections . She doesnot have hemoptysis . Since stopped smoking she is coughing less and dec sputum , sob with minimal ex , grade 3   On oxygen since 4/24 - 2.5 l   No cpap or psg                   Immunization   Immunization History   Administered Date(s) Administered    COVID-19, J&J, (age 18y+), IM, 0.5 mL 03/04/2021, 10/30/2021    COVID-19, MODERNA Bivalent, (age 12y+), IM, 50 mcg/0.5 mL 10/25/2022    Hep A-Hep B, TWINRIX, (age 18y+), IM, 1mL 08/11/2023, 09/15/2023, 02/13/2024    Pneumococcal, PCV-13, PREVNAR 13, (age 6w+), IM, 0.5mL 02/15/2018    Pneumococcal, PPSV23, PNEUMOVAX 23, (age 2y+), SC/IM, 0.5mL 09/21/2022    TDaP, ADACEL (age 10y-64y), BOOSTRIX (age 10y+), IM, 0.5mL 02/08/2023        Pneumococcal Vaccine     [x] Up to date    [] Indicated   [] Refused  [] Contraindicated       Influenza Vaccine   [] Up to date    [] Indicated   [x] Refused  [] Contraindicated        Pulmonary Rehab   [] Completed   [] Indicated   [] Refused  [] Contraindicated   PAST MEDICAL HISTORY:       Diagnosis Date    Acid reflux disease     COPD (chronic obstructive pulmonary disease) (HCC)     Depression          Family History:       Problem Relation Age of Onset    Heart Disease Mother     Memory Loss Mother     COPD Mother     Heart Disease Father     Memory Loss Father     COPD Father     Diabetes Maternal Grandmother     Cancer Other        SURGICAL HISTORY:   Past Surgical History:   Procedure Laterality Date    APPENDECTOMY      COLONOSCOPY      CYST REMOVAL      multiple    HYSTERECTOMY (CERVIX STATUS UNKNOWN)      AK LAPAROSCOPY SURG CHOLECYSTECTOMY N/A 1/29/2018    CHOLECYSTECTOMY LAPAROSCOPIC

## 2024-08-22 DIAGNOSIS — Z87.891 PERSONAL HISTORY OF TOBACCO USE: ICD-10-CM

## 2024-08-27 DIAGNOSIS — J44.9 STAGE 3 SEVERE COPD BY GOLD CLASSIFICATION (HCC): Primary | ICD-10-CM

## 2024-08-28 ENCOUNTER — HOSPITAL ENCOUNTER (OUTPATIENT)
Dept: PULMONOLOGY | Age: 74
Discharge: HOME | End: 2024-08-28
Payer: MEDICARE

## 2024-08-28 DIAGNOSIS — J44.9 STAGE 3 SEVERE COPD BY GOLD CLASSIFICATION (HCC): ICD-10-CM

## 2024-08-28 PROCEDURE — 94729 DIFFUSING CAPACITY: CPT

## 2024-08-28 PROCEDURE — 94726 PLETHYSMOGRAPHY LUNG VOLUMES: CPT

## 2024-08-28 PROCEDURE — 94060 EVALUATION OF WHEEZING: CPT

## 2024-08-28 PROCEDURE — 94640 AIRWAY INHALATION TREATMENT: CPT

## 2024-08-28 PROCEDURE — 6370000000 HC RX 637 (ALT 250 FOR IP): Performed by: INTERNAL MEDICINE

## 2024-08-28 RX ORDER — ALBUTEROL SULFATE 90 UG/1
4 AEROSOL, METERED RESPIRATORY (INHALATION) ONCE
Status: COMPLETED | OUTPATIENT
Start: 2024-08-28 | End: 2024-08-28

## 2024-08-28 RX ADMIN — ALBUTEROL SULFATE 4 PUFF: 90 AEROSOL, METERED RESPIRATORY (INHALATION) at 14:24

## 2024-08-31 LAB
DLCO %PRED: NORMAL
DLCO PRED: NORMAL
DLCO/VA %PRED: NORMAL
DLCO/VA PRED: NORMAL
DLCO/VA: NORMAL
DLCO: NORMAL
EXPIRATORY TIME-POST: NORMAL
EXPIRATORY TIME: NORMAL
FEF 25-75 %CHNG: NORMAL
FEF 25-75 POST %PRED: NORMAL
FEF 25-75% %PRED-PRE: NORMAL
FEF 25-75% PRED: NORMAL
FEF 25-75-POST: NORMAL
FEF 25-75-PRE: NORMAL
FEV1 %PRED-POST: NORMAL
FEV1 %PRED-PRE: NORMAL
FEV1 PRED: NORMAL
FEV1-POST: NORMAL
FEV1-PRE: NORMAL
FEV1/FVC %PRED-POST: NORMAL
FEV1/FVC %PRED-PRE: NORMAL
FEV1/FVC PRED: NORMAL
FEV1/FVC-POST: NORMAL
FEV1/FVC-PRE: NORMAL
FVC %PRED-POST: NORMAL
FVC %PRED-PRE: NORMAL
FVC PRED: NORMAL
FVC-POST: NORMAL
FVC-PRE: NORMAL
GAW %PRED: NORMAL
GAW PRED: NORMAL
GAW: NORMAL
IC PRE %PRED: NORMAL
IC PRED: NORMAL
IC: NORMAL
MEP: NORMAL
MIP: NORMAL
MVV %PRED-PRE: NORMAL
MVV PRED: NORMAL
MVV-PRE: NORMAL
PEF %PRED-POST: NORMAL
PEF %PRED-PRE: NORMAL
PEF PRED: NORMAL
PEF%CHNG: NORMAL
PEF-POST: NORMAL
PEF-PRE: NORMAL
RAW %PRED: NORMAL
RAW PRED: NORMAL
RAW: NORMAL
RV PRE %PRED: NORMAL
RV PRED: NORMAL
RV: NORMAL
SVC %PRED: NORMAL
SVC PRED: NORMAL
SVC: NORMAL
TLC PRE %PRED: NORMAL
TLC PRED: NORMAL
TLC: NORMAL
VA %PRED: NORMAL
VA PRED: NORMAL
VA: NORMAL
VTG %PRED: NORMAL
VTG PRED: NORMAL
VTG: NORMAL

## 2024-08-31 NOTE — PROCEDURES
[place] [address]                           PULMONARY FUNCTION      PATIENT NAME: TERESSA GARNER             : 1950  MED REC NO: [id number]                         ROOM:   ACCOUNT NO: [id number]                       ADMIT DATE: 2024  PROVIDER: Heath Hodgson MD      DATE OF PROCEDURE: 2024    Spirometry shows obstructive flow volume loop without a bronchodilator change.  FEV1 56% predicted with -17% bronchodilator change.  FVC 65% predicted with -15% bronchodilator change.  FEV1/FVC ratio is 65, post-bronchodilator 64.    Total lung capacity by body box 109% predicted.  % predicted consistent with hyperinflation and diffusion capacity uncorrected 64% predicted which is moderately decreased and airway resistance is increased.    FINAL IMPRESSION:  Stage II chronic obstructive pulmonary disease with hyperinflation and moderately decreased diffusion capacity due to emphysema.  Clinical correlation advised.          HEATH HODGSON MD      D:  2024 23:33:42     T:  2024 01:05:35     /JOEY  Job #:  [id number]     Doc#:  [id number]

## 2024-09-13 ENCOUNTER — HOSPITAL ENCOUNTER (OUTPATIENT)
Dept: GENERAL RADIOLOGY | Age: 74
Discharge: HOME | End: 2024-09-15
Attending: INTERNAL MEDICINE
Payer: MEDICARE

## 2024-09-13 ENCOUNTER — HOSPITAL ENCOUNTER (OUTPATIENT)
Dept: SPEECH THERAPY | Age: 74
Setting detail: THERAPIES SERIES
Discharge: HOME | End: 2024-09-13
Payer: MEDICARE

## 2024-09-13 ENCOUNTER — APPOINTMENT (OUTPATIENT)
Dept: GENERAL RADIOLOGY | Age: 74
End: 2024-09-13
Attending: INTERNAL MEDICINE
Payer: MEDICARE
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
## 2024-09-13 DIAGNOSIS — T17.928A ASPIRATION OF FOOD, INITIAL ENCOUNTER: Primary | ICD-10-CM
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
## 2024-09-13 DIAGNOSIS — T17.928A ASPIRATION OF FOOD, INITIAL ENCOUNTER: ICD-10-CM

## 2024-09-13 PROCEDURE — 2500000003 HC RX 250 WO HCPCS: Performed by: INTERNAL MEDICINE

## 2024-09-13 PROCEDURE — 92611 MOTION FLUOROSCOPY/SWALLOW: CPT | Performed by: SPEECH-LANGUAGE PATHOLOGIST

## 2024-09-13 PROCEDURE — 74230 X-RAY XM SWLNG FUNCJ C+: CPT

## 2024-09-13 RX ADMIN — BARIUM SULFATE 140 ML: 980 POWDER, FOR SUSPENSION ORAL at 08:42

## 2024-10-02 ENCOUNTER — HOSPITAL ENCOUNTER (OUTPATIENT)
Dept: GENERAL RADIOLOGY | Age: 74
Discharge: HOME | End: 2024-10-04
Attending: INTERNAL MEDICINE
Payer: MEDICARE

## 2024-10-02 DIAGNOSIS — R13.10 DYSPHAGIA, UNSPECIFIED TYPE: ICD-10-CM

## 2024-10-02 PROCEDURE — 74220 X-RAY XM ESOPHAGUS 1CNTRST: CPT

## 2024-10-02 PROCEDURE — 2500000003 HC RX 250 WO HCPCS: Performed by: FAMILY MEDICINE

## 2024-10-02 RX ADMIN — BARIUM SULFATE 140 ML: 980 POWDER, FOR SUSPENSION ORAL at 08:09

## 2024-10-02 RX ADMIN — BARIUM SULFATE 240 ML: 960 POWDER, FOR SUSPENSION ORAL at 08:09

## 2024-10-08 ENCOUNTER — HOSPITAL ENCOUNTER (EMERGENCY)
Age: 74
Discharge: HOME | End: 2024-10-08
Attending: EMERGENCY MEDICINE
Payer: MEDICARE

## 2024-10-08 ENCOUNTER — APPOINTMENT (OUTPATIENT)
Dept: GENERAL RADIOLOGY | Age: 74
End: 2024-10-08
Payer: MEDICARE

## 2024-10-08 VITALS
HEIGHT: 63 IN | WEIGHT: 205 LBS | HEART RATE: 90 BPM | TEMPERATURE: 98.7 F | SYSTOLIC BLOOD PRESSURE: 156 MMHG | BODY MASS INDEX: 36.32 KG/M2 | DIASTOLIC BLOOD PRESSURE: 72 MMHG | RESPIRATION RATE: 18 BRPM | OXYGEN SATURATION: 96 %

## 2024-10-08 DIAGNOSIS — J44.1 COPD EXACERBATION (HCC): Primary | ICD-10-CM

## 2024-10-08 LAB
ALBUMIN SERPL-MCNC: 4 G/DL (ref 3.5–5.2)
ALBUMIN/GLOB SERPL: 1.4 {RATIO} (ref 1–2.5)
ALP SERPL-CCNC: 112 U/L (ref 35–104)
ALT SERPL-CCNC: 36 U/L (ref 5–33)
ANION GAP SERPL CALCULATED.3IONS-SCNC: 8 MMOL/L (ref 9–17)
AST SERPL-CCNC: 21 U/L
BASOPHILS # BLD: 0.05 K/UL (ref 0–0.2)
BASOPHILS NFR BLD: 1 % (ref 0–2)
BILIRUB SERPL-MCNC: 0.4 MG/DL (ref 0.3–1.2)
BNP SERPL-MCNC: 37 PG/ML
BUN SERPL-MCNC: 13 MG/DL (ref 8–23)
BUN/CREAT SERPL: 14 (ref 9–20)
CALCIUM SERPL-MCNC: 9.6 MG/DL (ref 8.6–10.4)
CHLORIDE SERPL-SCNC: 103 MMOL/L (ref 98–107)
CO2 SERPL-SCNC: 28 MMOL/L (ref 20–31)
CREAT SERPL-MCNC: 0.9 MG/DL (ref 0.5–0.9)
EOSINOPHIL # BLD: 0.13 K/UL (ref 0–0.44)
EOSINOPHILS RELATIVE PERCENT: 1 % (ref 1–4)
ERYTHROCYTE [DISTWIDTH] IN BLOOD BY AUTOMATED COUNT: 13.4 % (ref 11.8–14.4)
GFR, ESTIMATED: 68 ML/MIN/1.73M2
GLUCOSE SERPL-MCNC: 121 MG/DL (ref 70–99)
HCT VFR BLD AUTO: 40.7 % (ref 36.3–47.1)
HGB BLD-MCNC: 13.4 G/DL (ref 11.9–15.1)
IMM GRANULOCYTES # BLD AUTO: 0.03 K/UL (ref 0–0.3)
IMM GRANULOCYTES NFR BLD: 0 %
LACTATE BLDV-SCNC: 2.9 MMOL/L (ref 0.5–2.2)
LYMPHOCYTES NFR BLD: 1.8 K/UL (ref 1.1–3.7)
LYMPHOCYTES RELATIVE PERCENT: 18 % (ref 24–43)
MCH RBC QN AUTO: 30.1 PG (ref 25.2–33.5)
MCHC RBC AUTO-ENTMCNC: 32.9 G/DL (ref 25.2–33.5)
MCV RBC AUTO: 91.5 FL (ref 82.6–102.9)
MONOCYTES NFR BLD: 0.7 K/UL (ref 0.1–1.2)
MONOCYTES NFR BLD: 7 % (ref 3–12)
NEUTROPHILS NFR BLD: 73 % (ref 36–65)
NEUTS SEG NFR BLD: 7.44 K/UL (ref 1.5–8.1)
NRBC BLD-RTO: 0 PER 100 WBC
PLATELET # BLD AUTO: 256 K/UL (ref 138–453)
PMV BLD AUTO: 9.1 FL (ref 8.1–13.5)
POTASSIUM SERPL-SCNC: 4.9 MMOL/L (ref 3.7–5.3)
PROT SERPL-MCNC: 6.9 G/DL (ref 6.4–8.3)
RBC # BLD AUTO: 4.45 M/UL (ref 3.95–5.11)
SARS-COV-2 RDRP RESP QL NAA+PROBE: NOT DETECTED
SODIUM SERPL-SCNC: 139 MMOL/L (ref 135–144)
SPECIMEN DESCRIPTION: NORMAL
TROPONIN I SERPL HS-MCNC: <6 NG/L (ref 0–14)
TROPONIN I SERPL HS-MCNC: <6 NG/L (ref 0–14)
WBC OTHER # BLD: 10.2 K/UL (ref 3.5–11.3)

## 2024-10-08 PROCEDURE — 87040 BLOOD CULTURE FOR BACTERIA: CPT

## 2024-10-08 PROCEDURE — 87635 SARS-COV-2 COVID-19 AMP PRB: CPT

## 2024-10-08 PROCEDURE — 85025 COMPLETE CBC W/AUTO DIFF WBC: CPT

## 2024-10-08 PROCEDURE — 36415 COLL VENOUS BLD VENIPUNCTURE: CPT

## 2024-10-08 PROCEDURE — 83605 ASSAY OF LACTIC ACID: CPT

## 2024-10-08 PROCEDURE — 71045 X-RAY EXAM CHEST 1 VIEW: CPT

## 2024-10-08 PROCEDURE — 96374 THER/PROPH/DIAG INJ IV PUSH: CPT

## 2024-10-08 PROCEDURE — 84484 ASSAY OF TROPONIN QUANT: CPT

## 2024-10-08 PROCEDURE — 94640 AIRWAY INHALATION TREATMENT: CPT

## 2024-10-08 PROCEDURE — 6360000002 HC RX W HCPCS: Performed by: EMERGENCY MEDICINE

## 2024-10-08 PROCEDURE — 6370000000 HC RX 637 (ALT 250 FOR IP)

## 2024-10-08 PROCEDURE — 83880 ASSAY OF NATRIURETIC PEPTIDE: CPT

## 2024-10-08 PROCEDURE — 80053 COMPREHEN METABOLIC PANEL: CPT

## 2024-10-08 PROCEDURE — 93005 ELECTROCARDIOGRAM TRACING: CPT | Performed by: EMERGENCY MEDICINE

## 2024-10-08 PROCEDURE — 99285 EMERGENCY DEPT VISIT HI MDM: CPT

## 2024-10-08 RX ORDER — IPRATROPIUM BROMIDE AND ALBUTEROL SULFATE 2.5; .5 MG/3ML; MG/3ML
1 SOLUTION RESPIRATORY (INHALATION)
Status: DISCONTINUED | OUTPATIENT
Start: 2024-10-08 | End: 2024-10-08 | Stop reason: HOSPADM

## 2024-10-08 RX ORDER — METHYLPREDNISOLONE SODIUM SUCCINATE 125 MG/2ML
125 INJECTION, POWDER, LYOPHILIZED, FOR SOLUTION INTRAMUSCULAR; INTRAVENOUS ONCE
Status: COMPLETED | OUTPATIENT
Start: 2024-10-08 | End: 2024-10-08

## 2024-10-08 RX ORDER — PREDNISONE 50 MG/1
50 TABLET ORAL DAILY
Qty: 4 TABLET | Refills: 0 | Status: SHIPPED | OUTPATIENT
Start: 2024-10-09 | End: 2024-10-13

## 2024-10-08 RX ORDER — IPRATROPIUM BROMIDE AND ALBUTEROL SULFATE 2.5; .5 MG/3ML; MG/3ML
SOLUTION RESPIRATORY (INHALATION)
Status: COMPLETED
Start: 2024-10-08 | End: 2024-10-08

## 2024-10-08 RX ADMIN — IPRATROPIUM BROMIDE AND ALBUTEROL SULFATE 1 DOSE: 2.5; .5 SOLUTION RESPIRATORY (INHALATION) at 17:58

## 2024-10-08 RX ADMIN — METHYLPREDNISOLONE SODIUM SUCCINATE 125 MG: 125 INJECTION INTRAMUSCULAR; INTRAVENOUS at 18:03

## 2024-10-08 RX ADMIN — IPRATROPIUM BROMIDE AND ALBUTEROL SULFATE 1 DOSE: .5; 2.5 SOLUTION RESPIRATORY (INHALATION) at 17:58

## 2024-10-08 ASSESSMENT — ENCOUNTER SYMPTOMS
WHEEZING: 1
BLOOD IN STOOL: 0
NAUSEA: 0
VOMITING: 0
DIARRHEA: 0
SHORTNESS OF BREATH: 1
EYE PAIN: 0
ABDOMINAL PAIN: 0
COUGH: 0
BACK PAIN: 0
CONSTIPATION: 0

## 2024-10-08 ASSESSMENT — PAIN - FUNCTIONAL ASSESSMENT
PAIN_FUNCTIONAL_ASSESSMENT: NONE - DENIES PAIN
PAIN_FUNCTIONAL_ASSESSMENT: NONE - DENIES PAIN

## 2024-10-08 ASSESSMENT — LIFESTYLE VARIABLES
HOW MANY STANDARD DRINKS CONTAINING ALCOHOL DO YOU HAVE ON A TYPICAL DAY: PATIENT DOES NOT DRINK
HOW OFTEN DO YOU HAVE A DRINK CONTAINING ALCOHOL: NEVER

## 2024-10-08 NOTE — ED PROVIDER NOTES
[facility]  eMERGENCY dEPARTMENT eNCOUnter      Pt Name: Cheyenne Garvey  MRN: [id number]  Birthdate 1950  Date of evaluation: 10/8/2024      CHIEF COMPLAINT       Chief Complaint   Patient presents with    Shortness of Breath         HISTORY OF PRESENT ILLNESS    Cheyenne Garvey is a 73 y.o. female who presents with increasing shortness of breath has been going on for a couple of days she has a history of COPD normally she is on 2 to 2-1/2 L she had to increase to 3 she short of breath with any kind of activity she has noticed a little bit of symmetrical lower extremity edema she normally gets some of the end of the day but is a bit worse no chest pain other than just tightness she has been using her aerosols and have not been helped there is been no fevers she does not had a productive cough  Patient states this feels like when she has had exacerbations of her COPD before she is an ex-smoker having quit over 6 months ago  There is been no recent travel or immobilization  REVIEW OF SYSTEMS         Review of Systems   Constitutional:  Positive for fatigue. Negative for chills and fever.   HENT:  Negative for congestion and ear pain.    Eyes:  Negative for pain and visual disturbance.   Respiratory:  Positive for shortness of breath and wheezing. Negative for cough.    Cardiovascular:  Positive for leg swelling. Negative for chest pain and palpitations.   Gastrointestinal:  Negative for abdominal pain, blood in stool, constipation, diarrhea, nausea and vomiting.   Endocrine: Negative for polydipsia and polyuria.   Genitourinary:  Negative for difficulty urinating, dysuria and frequency.   Musculoskeletal:  Negative for back pain, joint swelling, myalgias, neck pain and neck stiffness.   Skin:  Negative for rash.   Neurological:  Negative for dizziness, weakness and headaches.   Hematological:  Negative for adenopathy. Does not bruise/bleed easily.   Psychiatric/Behavioral:  Negative for confusion,

## 2024-10-09 LAB
EKG ATRIAL RATE: 97 BPM
EKG P AXIS: 57 DEGREES
EKG P-R INTERVAL: 182 MS
EKG Q-T INTERVAL: 352 MS
EKG QRS DURATION: 82 MS
EKG QTC CALCULATION (BAZETT): 447 MS
EKG R AXIS: -2 DEGREES
EKG T AXIS: 52 DEGREES
EKG VENTRICULAR RATE: 97 BPM

## 2024-10-09 NOTE — ED PROVIDER NOTES
ADDENDUM:        The patient was seen for Shortness of Breath  .  The patient's initial evaluation and plan have been discussed with the prior provider who initially evaluated the patient.  Nursing Notes, Past Medical Hx, Past Surgical Hx, Social Hx, Allergies, and Family Hx were all reviewed.    PAST MEDICAL HISTORY    has a past medical history of Acid reflux disease, COPD (chronic obstructive pulmonary disease) (HCC), and Depression.    SURGICAL HISTORY      has a past surgical history that includes Appendectomy; Hysterectomy; Colonoscopy; Tubal ligation; Upper gastrointestinal endoscopy; cyst removal; and pr laparoscopy surg cholecystectomy (N/A, 1/29/2018).    CURRENT MEDICATIONS       Previous Medications    ALBUTEROL (PROVENTIL) (2.5 MG/3ML) 0.083% NEBULIZER SOLUTION    Take 3 mLs by nebulization every 6 hours as needed for Wheezing or Shortness of Breath    ASPIRIN 81 MG EC TABLET    Take 1 tablet by mouth daily    CALCIUM CARBONATE (OSCAL) 500 MG TABS TABLET    Take 1 tablet by mouth daily    CHOLECALCIFEROL (VITAMIN D PO)    Take 500 mg by mouth daily    CINNAMON 500 MG CAPS    Take by mouth    CLONAZEPAM (KLONOPIN) 0.25 MG DISINTEGRATING TABLET    Take 1 tablet by mouth 2 times daily as needed.    ELDERBERRY PO    Take by mouth     FOLIC ACID (FOLVITE) 1 MG TABLET    Take 1 tablet by mouth daily    LAMOTRIGINE (LAMICTAL) 100 MG TABLET    Take 1 tablet by mouth daily    MAGNESIUM PO    Take by mouth    OMEPRAZOLE (PRILOSEC) 20 MG DELAYED RELEASE CAPSULE    Take 45 capsules by mouth Daily    PYRIDOXINE HCL (VITAMIN B-6) 100 MG TABLET    Take 1 tablet by mouth daily    TIOTROPIUM-OLODATEROL (STIOLTO RESPIMAT) 2.5-2.5 MCG/ACT AERS    Inhale 2 puffs into the lungs daily    TRAZODONE (DESYREL) 150 MG TABLET    Take 1 tablet by mouth nightly    VALACYCLOVIR (VALTREX) 1 G TABLET        VENTOLIN  (90 BASE) MCG/ACT INHALER    USE 2 INHALATIONS FOUR TIMES A DAY AS NEEDED FOR WHEEZING OR SHORTNESS OF

## 2024-10-09 NOTE — DISCHARGE INSTRUCTIONS
Take medications as prescribed  Follow-up with family physician for reevaluation    Return immediately if any worsening symptoms including worsening shortness of breath, fevers or any other concerns    Please understand that early in the process of an illness or injury, an emergency department workup can be falsely reassuring.      Tell us how we did visit: [email or web address]   and let us know about your experience

## 2024-10-16 ENCOUNTER — OFFICE VISIT (OUTPATIENT)
Dept: CARDIOLOGY | Age: 74
End: 2024-10-16
Payer: MEDICARE

## 2024-10-16 VITALS
SYSTOLIC BLOOD PRESSURE: 124 MMHG | HEART RATE: 100 BPM | DIASTOLIC BLOOD PRESSURE: 60 MMHG | HEIGHT: 63 IN | WEIGHT: 205 LBS | BODY MASS INDEX: 36.32 KG/M2

## 2024-10-16 DIAGNOSIS — R06.09 DYSPNEA ON EXERTION: ICD-10-CM

## 2024-10-16 DIAGNOSIS — I25.9 CHEST PAIN DUE TO MYOCARDIAL ISCHEMIA, UNSPECIFIED ISCHEMIC CHEST PAIN TYPE: Primary | ICD-10-CM

## 2024-10-16 DIAGNOSIS — R00.2 PALPITATIONS: ICD-10-CM

## 2024-10-16 PROCEDURE — 99204 OFFICE O/P NEW MOD 45 MIN: CPT | Performed by: INTERNAL MEDICINE

## 2024-10-16 PROCEDURE — 1123F ACP DISCUSS/DSCN MKR DOCD: CPT | Performed by: INTERNAL MEDICINE

## 2024-10-16 RX ORDER — PREDNISONE 10 MG/1
10 TABLET ORAL DAILY
COMMUNITY
Start: 2024-09-20

## 2024-10-16 NOTE — PROGRESS NOTES
Today's Date: 10/16/2024  Patient's Name: Cheyenne Garvey  Patient's age: 73 y.o., 1950    Subjective:  Cheyenne Garvey is being seen in clinic today regarding   Chief Complaint   Patient presents with    Tachycardia    Establish Cardiologist       she is here to establish cardiology care.  She has COPD and is on home oxygen.  She reports intermittent substernal chest pain - sharp pain and at time burning sensation. She reports chronic dyspnea on minimal exertion. She also reports fast HR with minimal exertion. She thinks it could be GERD and made worse with water. She has aspiration. There is concern of dysphagia as well. Barium swallow 9/13/2024-grossly normal. Esophagram 10/2/24-unremarkable. GI apt 12/5/24. She reports mild leg swelling by evening. She is a former smoker- quit smoking 4/2024.          Past Medical History:   has a past medical history of Acid reflux disease, COPD (chronic obstructive pulmonary disease) (HCC), and Depression.    Past Surgical History:   has a past surgical history that includes Appendectomy; Hysterectomy; Colonoscopy; Tubal ligation; Upper gastrointestinal endoscopy; cyst removal; and pr laparoscopy surg cholecystectomy (N/A, 1/29/2018).    Home Medications:  Prior to Admission medications    Medication Sig Start Date End Date Taking? Authorizing Provider   valACYclovir (VALTREX) 1 g tablet  6/11/24   Terri Alexandre MD   tiotropium-olodaterol (STIOLTO RESPIMAT) 2.5-2.5 MCG/ACT AERS Inhale 2 puffs into the lungs daily    ProviderTerri MD   Cinnamon 500 MG CAPS Take by mouth    Terri Alexandre MD   VENTOLIN  (90 Base) MCG/ACT inhaler USE 2 INHALATIONS FOUR TIMES A DAY AS NEEDED FOR WHEEZING OR SHORTNESS OF BREATH 10/4/23   Jane Rodriguez APRN - CNP   albuterol (PROVENTIL) (2.5 MG/3ML) 0.083% nebulizer solution Take 3 mLs by nebulization every 6 hours as needed for Wheezing or Shortness of Breath 2/27/23   Jane Rodriguez APRN - CNP

## 2024-10-28 ENCOUNTER — HOSPITAL ENCOUNTER (OUTPATIENT)
Age: 74
Discharge: HOME | End: 2024-10-30
Attending: INTERNAL MEDICINE
Payer: MEDICARE

## 2024-10-28 ENCOUNTER — TELEPHONE (OUTPATIENT)
Dept: CARDIOLOGY | Age: 74
End: 2024-10-28

## 2024-10-28 ENCOUNTER — HOSPITAL ENCOUNTER (OUTPATIENT)
Dept: NUCLEAR MEDICINE | Age: 74
Discharge: HOME | End: 2024-10-30
Attending: INTERNAL MEDICINE
Payer: MEDICARE

## 2024-10-28 VITALS
HEART RATE: 95 BPM | SYSTOLIC BLOOD PRESSURE: 141 MMHG | BODY MASS INDEX: 36.32 KG/M2 | HEIGHT: 63 IN | DIASTOLIC BLOOD PRESSURE: 65 MMHG | WEIGHT: 205 LBS

## 2024-10-28 DIAGNOSIS — I25.9 CHEST PAIN DUE TO MYOCARDIAL ISCHEMIA, UNSPECIFIED ISCHEMIC CHEST PAIN TYPE: ICD-10-CM

## 2024-10-28 DIAGNOSIS — R06.09 DYSPNEA ON EXERTION: ICD-10-CM

## 2024-10-28 DIAGNOSIS — R00.2 PALPITATIONS: ICD-10-CM

## 2024-10-28 LAB
ECHO AO ASC DIAM: 2.9 CM
ECHO AO ASCENDING AORTA INDEX: 1.49 CM/M2
ECHO AO ROOT DIAM: 3.1 CM
ECHO AO ROOT INDEX: 1.59 CM/M2
ECHO AV AREA PEAK VELOCITY: 2.7 CM2
ECHO AV AREA/BSA PEAK VELOCITY: 1.4 CM2/M2
ECHO AV PEAK GRADIENT: 6 MMHG
ECHO AV PEAK VELOCITY: 1.2 M/S
ECHO AV VELOCITY RATIO: 0.92
ECHO BSA: 2.03 M2
ECHO BSA: 2.03 M2
ECHO EST RA PRESSURE: 8 MMHG
ECHO LA AREA 4C: 14.1 CM2
ECHO LA DIAMETER INDEX: 1.85 CM/M2
ECHO LA DIAMETER: 3.6 CM
ECHO LA MAJOR AXIS: 4.7 CM
ECHO LA TO AORTIC ROOT RATIO: 1.16
ECHO LA VOL MOD A4C: 34 ML (ref 22–52)
ECHO LA VOLUME INDEX MOD A4C: 17 ML/M2 (ref 16–34)
ECHO LV E' LATERAL VELOCITY: 6.9 CM/S
ECHO LV E' SEPTAL VELOCITY: 4.9 CM/S
ECHO LV EDV A4C: 51 ML
ECHO LV EDV INDEX A4C: 26 ML/M2
ECHO LV EF PHYSICIAN: 73 %
ECHO LV EJECTION FRACTION A4C: 73 %
ECHO LV ESV A4C: 14 ML
ECHO LV ESV INDEX A4C: 7 ML/M2
ECHO LV FRACTIONAL SHORTENING: 57 % (ref 28–44)
ECHO LV INTERNAL DIMENSION DIASTOLE INDEX: 1.9 CM/M2
ECHO LV INTERNAL DIMENSION DIASTOLIC: 3.7 CM (ref 3.9–5.3)
ECHO LV INTERNAL DIMENSION SYSTOLIC INDEX: 0.82 CM/M2
ECHO LV INTERNAL DIMENSION SYSTOLIC: 1.6 CM
ECHO LV ISOVOLUMETRIC RELAXATION TIME (IVRT): 85 MS
ECHO LV IVSD: 1.5 CM (ref 0.6–0.9)
ECHO LV MASS 2D: 208.7 G (ref 67–162)
ECHO LV MASS INDEX 2D: 107 G/M2 (ref 43–95)
ECHO LV POSTERIOR WALL DIASTOLIC: 1.5 CM (ref 0.6–0.9)
ECHO LV RELATIVE WALL THICKNESS RATIO: 0.81
ECHO LVOT AREA: 3.1 CM2
ECHO LVOT DIAM: 2 CM
ECHO LVOT PEAK GRADIENT: 4 MMHG
ECHO LVOT PEAK VELOCITY: 1.1 M/S
ECHO MV A VELOCITY: 0.9 M/S
ECHO MV E DECELERATION TIME (DT): 222 MS
ECHO MV E VELOCITY: 0.89 M/S
ECHO MV E/A RATIO: 0.99
ECHO MV E/E' LATERAL: 12.9
ECHO MV E/E' RATIO (AVERAGED): 15.53
ECHO MV E/E' SEPTAL: 18.16
ECHO MV MAX VELOCITY: 1 M/S
ECHO MV PEAK GRADIENT: 4 MMHG
ECHO PV MAX VELOCITY: 0.9 M/S
ECHO PV PEAK GRADIENT: 4 MMHG
ECHO RIGHT VENTRICULAR SYSTOLIC PRESSURE (RVSP): 27 MMHG
ECHO TV PEAK GRADIENT: 2 MMHG
ECHO TV REGURGITANT MAX VELOCITY: 2.2 M/S
ECHO TV REGURGITANT PEAK GRADIENT: 19 MMHG
NUC STRESS EJECTION FRACTION: 85 %
STRESS BASELINE DIAS BP: 62 MMHG
STRESS BASELINE HR: 85 BPM
STRESS BASELINE SYS BP: 140 MMHG
STRESS ESTIMATED WORKLOAD: 1 METS
STRESS PEAK DIAS BP: 58 MMHG
STRESS PEAK SYS BP: 157 MMHG
STRESS PERCENT HR ACHIEVED: 73 %
STRESS POST PEAK HR: 109 BPM
STRESS RATE PRESSURE PRODUCT: NORMAL BPM*MMHG
STRESS TARGET HR: 149 BPM
TID: 0.89

## 2024-10-28 PROCEDURE — 3430000000 HC RX DIAGNOSTIC RADIOPHARMACEUTICAL: Performed by: INTERNAL MEDICINE

## 2024-10-28 PROCEDURE — 78452 HT MUSCLE IMAGE SPECT MULT: CPT

## 2024-10-28 PROCEDURE — A9500 TC99M SESTAMIBI: HCPCS | Performed by: INTERNAL MEDICINE

## 2024-10-28 PROCEDURE — 6360000002 HC RX W HCPCS: Performed by: INTERNAL MEDICINE

## 2024-10-28 PROCEDURE — 93306 TTE W/DOPPLER COMPLETE: CPT

## 2024-10-28 PROCEDURE — 93226 XTRNL ECG REC<48 HR SCAN A/R: CPT

## 2024-10-28 PROCEDURE — 93018 CV STRESS TEST I&R ONLY: CPT | Performed by: INTERNAL MEDICINE

## 2024-10-28 PROCEDURE — 93306 TTE W/DOPPLER COMPLETE: CPT | Performed by: INTERNAL MEDICINE

## 2024-10-28 PROCEDURE — 93017 CV STRESS TEST TRACING ONLY: CPT

## 2024-10-28 RX ORDER — REGADENOSON 0.08 MG/ML
0.4 INJECTION, SOLUTION INTRAVENOUS ONCE
Status: COMPLETED | OUTPATIENT
Start: 2024-10-28 | End: 2024-10-28

## 2024-10-28 RX ORDER — TETRAKIS(2-METHOXYISOBUTYLISOCYANIDE)COPPER(I) TETRAFLUOROBORATE 1 MG/ML
10 INJECTION, POWDER, LYOPHILIZED, FOR SOLUTION INTRAVENOUS
Status: COMPLETED | OUTPATIENT
Start: 2024-10-28 | End: 2024-10-28

## 2024-10-28 RX ORDER — TETRAKIS(2-METHOXYISOBUTYLISOCYANIDE)COPPER(I) TETRAFLUOROBORATE 1 MG/ML
30 INJECTION, POWDER, LYOPHILIZED, FOR SOLUTION INTRAVENOUS
Status: COMPLETED | OUTPATIENT
Start: 2024-10-28 | End: 2024-10-28

## 2024-10-28 RX ADMIN — REGADENOSON 0.4 MG: 0.08 INJECTION, SOLUTION INTRAVENOUS at 10:56

## 2024-10-28 RX ADMIN — Medication 10 MILLICURIE: at 09:40

## 2024-10-28 RX ADMIN — Medication 30 MILLICURIE: at 11:00

## 2024-10-28 NOTE — TELEPHONE ENCOUNTER
Stress and Echo ready for review        Interpretation Summary  Espinoza Lancaster MD [facility] Clinical Staff  Normal stress reported  Show Result Comparison     Stress Combined Conclusion: The study is negative for myocardial ischemia and infarction. Findings suggest a low risk of cardiac events.    Stress Function: Left ventricular function post-stress is normal. Post-stress ejection fraction is 85%.    Perfusion Comments: Prone images were not obtained. LV perfusion is probably normal. There is no evidence of inducible ischemia.    Perfusion Conclusion: TID ratio is 0.89.    ECG: Resting ECG demonstrates normal sinus rhythm.    ECG: The stress ECG was negative for ischemia.    Stress Test: A pharmacological stress test was performed using regadenoson (Lexiscan).    Resting ECG: The ECG shows sinus rhythm. Resting ECG shows non-specific ST-segment abnormalities.    Stress ECG: There were no arrhythmias during stress. Non-specific ST abnormality noted. The stress ECG was negative for ischemia.   -------------------------------------------------------      Espinoza Lancaster MD [facility] Clinical Staff  No significant issues           Contains abnormal data Echo (TTE) complete (PRN contrast/bubble/strain/3D)    Interpretation Summary  Show Result Comparison     Left Ventricle: Hyperdynamic left ventricular systolic function. EF by visual approximation is 73%. Left ventricle size is normal. Increased wall thickness. Normal wall motion. Abnormal diastolic function.    Mitral Valve: Trace regurgitation.    Tricuspid Valve: Trace regurgitation. The estimated RVSP is 27 mmHg.    Image quality is fair.

## 2024-10-30 LAB — ECHO BSA: 2.03 M2

## 2024-10-31 NOTE — TELEPHONE ENCOUNTER
Pt returned call and informed the stress, echo and holter all had no significant issues. Pt aware of her appt 5/7/2025.    Nothing further at this time.

## 2024-12-09 ENCOUNTER — TELEPHONE (OUTPATIENT)
Dept: PULMONOLOGY | Age: 74
End: 2024-12-09

## 2024-12-09 NOTE — TELEPHONE ENCOUNTER
Patient needing pulmonary clearance for EGD scheduled 12/31/24. Please see attached scan.    Last Appt:  8/21/2024  Next Appt:   2/26/2025  Med verified in Epic

## 2024-12-26 ENCOUNTER — HOSPITAL ENCOUNTER (OUTPATIENT)
Dept: ULTRASOUND IMAGING | Age: 74
Discharge: HOME | End: 2024-12-26
Payer: MEDICARE

## 2024-12-26 DIAGNOSIS — K76.0 FATTY LIVER: ICD-10-CM

## 2024-12-26 DIAGNOSIS — R05.9 COUGH, UNSPECIFIED TYPE: ICD-10-CM

## 2024-12-26 DIAGNOSIS — R10.13 EPIGASTRIC PAIN: ICD-10-CM

## 2024-12-26 DIAGNOSIS — K21.9 CHRONIC GERD: ICD-10-CM

## 2024-12-26 DIAGNOSIS — R13.10 DYSPHAGIA, UNSPECIFIED TYPE: ICD-10-CM

## 2024-12-26 PROCEDURE — 76705 ECHO EXAM OF ABDOMEN: CPT

## 2024-12-26 PROCEDURE — 76981 USE PARENCHYMA: CPT

## 2025-01-23 ENCOUNTER — HOSPITAL ENCOUNTER (OUTPATIENT)
Dept: CT IMAGING | Age: 75
Discharge: HOME | End: 2025-01-25
Attending: INTERNAL MEDICINE
Payer: MEDICARE

## 2025-01-23 DIAGNOSIS — Z87.891 PERSONAL HISTORY OF TOBACCO USE: ICD-10-CM

## 2025-01-23 PROCEDURE — 71271 CT THORAX LUNG CANCER SCR C-: CPT

## 2025-02-26 ENCOUNTER — OFFICE VISIT (OUTPATIENT)
Dept: PULMONOLOGY | Age: 75
End: 2025-02-26
Payer: MEDICARE

## 2025-02-26 VITALS
HEART RATE: 78 BPM | OXYGEN SATURATION: 95 % | RESPIRATION RATE: 20 BRPM | BODY MASS INDEX: 37.95 KG/M2 | WEIGHT: 214.2 LBS | DIASTOLIC BLOOD PRESSURE: 84 MMHG | HEIGHT: 63 IN | SYSTOLIC BLOOD PRESSURE: 136 MMHG | TEMPERATURE: 97.4 F

## 2025-02-26 DIAGNOSIS — J43.2 CENTRILOBULAR EMPHYSEMA (HCC): ICD-10-CM

## 2025-02-26 DIAGNOSIS — J96.11 CHRONIC RESPIRATORY FAILURE WITH HYPOXIA (HCC): ICD-10-CM

## 2025-02-26 DIAGNOSIS — J44.9 STAGE 3 SEVERE COPD BY GOLD CLASSIFICATION (HCC): Primary | ICD-10-CM

## 2025-02-26 PROCEDURE — 99213 OFFICE O/P EST LOW 20 MIN: CPT | Performed by: INTERNAL MEDICINE

## 2025-02-26 NOTE — PROGRESS NOTES
6 minute walk:    Patient ambulated approximately 600 feet. Patient saturation was 95% on room air at rest. During walk, saturation dropped to 87%. During walk, patient was placed on 2 liters of oxygen and saturation increased to 96%.

## 2025-03-13 ENCOUNTER — APPOINTMENT (OUTPATIENT)
Dept: GENERAL RADIOLOGY | Age: 75
End: 2025-03-13
Payer: MEDICARE

## 2025-03-13 ENCOUNTER — HOSPITAL ENCOUNTER (EMERGENCY)
Age: 75
Discharge: HOME | End: 2025-03-13
Attending: EMERGENCY MEDICINE
Payer: MEDICARE

## 2025-03-13 VITALS
DIASTOLIC BLOOD PRESSURE: 38 MMHG | BODY MASS INDEX: 37.56 KG/M2 | HEIGHT: 63 IN | SYSTOLIC BLOOD PRESSURE: 128 MMHG | HEART RATE: 82 BPM | TEMPERATURE: 97.9 F | RESPIRATION RATE: 37 BRPM | OXYGEN SATURATION: 98 % | WEIGHT: 212 LBS

## 2025-03-13 DIAGNOSIS — R06.00 DYSPNEA, UNSPECIFIED TYPE: Primary | ICD-10-CM

## 2025-03-13 DIAGNOSIS — J44.1 COPD EXACERBATION (HCC): ICD-10-CM

## 2025-03-13 LAB
ALBUMIN SERPL BCP-MCNC: 3.4 GM/DL (ref 3.4–5)
ALP SERPL-CCNC: 123 U/L (ref 46–116)
ALT SERPL W P-5'-P-CCNC: 48 U/L (ref 14–63)
ANION GAP SERPL CALC-SCNC: 8 MEQ/L (ref 8–16)
AST SERPL W P-5'-P-CCNC: 22 U/L (ref 15–37)
BASOPHILS # BLD: 0.3 % (ref 0–3)
BASOPHILS ABSOLUTE: 0 THOU/MM3 (ref 0–0.1)
BILIRUB SERPL-MCNC: 0.3 MG/DL (ref 0.2–1)
BUN SERPL-MCNC: 16 MG/DL (ref 7–18)
CALCIUM SERPL-MCNC: 9.4 MG/DL (ref 8.5–10.1)
CHLORIDE SERPL-SCNC: 106 MEQ/L (ref 98–107)
CO2 SERPL-SCNC: 27 MEQ/L (ref 21–32)
CREAT SERPL-MCNC: 1 MG/DL (ref 0.6–1.3)
EKG ATRIAL RATE: 107 BPM
EKG P AXIS: 69 DEGREES
EKG P-R INTERVAL: 182 MS
EKG Q-T INTERVAL: 348 MS
EKG QRS DURATION: 82 MS
EKG QTC CALCULATION (BAZETT): 464 MS
EKG R AXIS: 32 DEGREES
EKG T AXIS: 76 DEGREES
EKG VENTRICULAR RATE: 107 BPM
EOSINOPHILS ABSOLUTE: 0 THOU/MM3 (ref 0–0.5)
EOSINOPHILS RELATIVE PERCENT: 0.1 % (ref 0–4)
GFR SERPL CREATININE-BSD FRML MDRD: 60 ML/MIN/1.73M2
GLUCOSE SERPL-MCNC: 212 MG/DL (ref 74–106)
HCT VFR BLD CALC: 39 % (ref 37–47)
HEMOGLOBIN: 12.4 GM/DL (ref 12–16)
IMMATURE GRANS (ABS): 0.1 THOU/MM3 (ref 0–0.07)
IMMATURE GRANULOCYTES %: 1 %
INFLUENZA A BY PCR: NOT DETECTED
INFLUENZA B BY PCR: NOT DETECTED
LYMPHOCYTES # BLD AUTO: 12.4 % (ref 15–47)
LYMPHOCYTES ABSOLUTE: 1.2 THOU/MM3 (ref 1–4.8)
MCH RBC QN AUTO: 29.1 PG (ref 26–32)
MCHC RBC AUTO-ENTMCNC: 31.8 GM/DL (ref 31–35)
MCV RBC AUTO: 91.5 FL (ref 81–99)
MONOCYTES: 0.3 THOU/MM3 (ref 0.3–1.3)
MONOCYTES: 2.8 % (ref 0–12)
NEUTROPHILS ABSOLUTE: 8.2 THOU/MM3 (ref 1.8–7.7)
NT PRO BNP: 182 PG/ML (ref 0–125)
PDW BLD-RTO: 14.1 % (ref 11.5–14.9)
PLATELET # BLD AUTO: 262 THOU/MM3 (ref 130–400)
PMV BLD AUTO: 9 FL (ref 9.4–12.4)
POTASSIUM SERPL-SCNC: 4.5 MEQ/L (ref 3.5–5.1)
PROT SERPL-MCNC: 6.6 GM/DL (ref 6.4–8.2)
RBC # BLD: 4.26 MILL/MM3 (ref 4.1–5.3)
SARS-COV-2 RNA, RT PCR: NOT DETECTED
SEG NEUTROPHILS: 83.9 % (ref 43–75)
SODIUM SERPL-SCNC: 141 MEQ/L (ref 136–145)
TROPONIN, HIGH SENSITIVITY: 6.2 PG/ML (ref 0–51.3)
WBC # BLD: 9.8 THOU/MM3 (ref 4.8–10.8)

## 2025-03-13 PROCEDURE — 84484 ASSAY OF TROPONIN QUANT: CPT

## 2025-03-13 PROCEDURE — 99285 EMERGENCY DEPT VISIT HI MDM: CPT

## 2025-03-13 PROCEDURE — 87636 SARSCOV2 & INF A&B AMP PRB: CPT

## 2025-03-13 PROCEDURE — 93005 ELECTROCARDIOGRAM TRACING: CPT | Performed by: EMERGENCY MEDICINE

## 2025-03-13 PROCEDURE — 83880 ASSAY OF NATRIURETIC PEPTIDE: CPT

## 2025-03-13 PROCEDURE — 6360000002 HC RX W HCPCS: Performed by: EMERGENCY MEDICINE

## 2025-03-13 PROCEDURE — 85025 COMPLETE CBC W/AUTO DIFF WBC: CPT

## 2025-03-13 PROCEDURE — 6370000000 HC RX 637 (ALT 250 FOR IP): Performed by: EMERGENCY MEDICINE

## 2025-03-13 PROCEDURE — 96374 THER/PROPH/DIAG INJ IV PUSH: CPT

## 2025-03-13 PROCEDURE — 2500000003 HC RX 250 WO HCPCS: Performed by: EMERGENCY MEDICINE

## 2025-03-13 PROCEDURE — 71046 X-RAY EXAM CHEST 2 VIEWS: CPT

## 2025-03-13 PROCEDURE — 93010 ELECTROCARDIOGRAM REPORT: CPT | Performed by: INTERNAL MEDICINE

## 2025-03-13 PROCEDURE — 80053 COMPREHEN METABOLIC PANEL: CPT

## 2025-03-13 RX ORDER — PREDNISONE 10 MG/1
TABLET ORAL
Qty: 30 TABLET | Refills: 0 | Status: SHIPPED | OUTPATIENT
Start: 2025-03-13

## 2025-03-13 RX ORDER — LEVOFLOXACIN 500 MG/1
500 TABLET, FILM COATED ORAL DAILY
Qty: 7 TABLET | Refills: 0 | Status: SHIPPED | OUTPATIENT
Start: 2025-03-13 | End: 2025-03-20

## 2025-03-13 RX ORDER — LEVOFLOXACIN 500 MG/1
500 TABLET, FILM COATED ORAL ONCE
Status: COMPLETED | OUTPATIENT
Start: 2025-03-13 | End: 2025-03-13

## 2025-03-13 RX ADMIN — WATER 125 MG: 1 INJECTION INTRAMUSCULAR; INTRAVENOUS; SUBCUTANEOUS at 16:04

## 2025-03-13 RX ADMIN — LEVOFLOXACIN 500 MG: 500 TABLET, FILM COATED ORAL at 16:08

## 2025-03-13 ASSESSMENT — PAIN DESCRIPTION - DESCRIPTORS: DESCRIPTORS: TIGHTNESS

## 2025-03-13 ASSESSMENT — PAIN - FUNCTIONAL ASSESSMENT
PAIN_FUNCTIONAL_ASSESSMENT: ACTIVITIES ARE NOT PREVENTED
PAIN_FUNCTIONAL_ASSESSMENT: 0-10

## 2025-03-13 ASSESSMENT — PAIN DESCRIPTION - LOCATION: LOCATION: CHEST

## 2025-03-13 ASSESSMENT — PAIN DESCRIPTION - PAIN TYPE: TYPE: ACUTE PAIN

## 2025-03-13 ASSESSMENT — PAIN SCALES - GENERAL: PAINLEVEL_OUTOF10: 9

## 2025-03-13 NOTE — ED NOTES
RN called to get patient from [facility] d/t shortness of breath, pt then brought to [facility] via wheelchair, c/o SOB that got really bad around 1:30 pm.  Pt states last week Thursday and Friday she started to notice SOB.  Pt reports it got bad on Sunday, but then it continued to worsen and this morning she called her PCP who ordered her prednisone and took 20 mg this morning and 10 mg this afternoon.  Pt reports she also tried a nebulizer breathing treatment at 1:30 pm, but it got worse after her breathing treatment.  Pt then came in at that time.  Pt's respiration are tachypneic and labored on arrival, pt's skin is pink, warm, and dry.  Pt's gait steady as she transfers from wheelchair to stretcher.

## 2025-03-13 NOTE — ED PROVIDER NOTES
[facility] Emergency Department  [address]  [facility] [address]  Phone: [phone]  EMERGENCY DEPARTMENT ENCOUNTER      Pt Name: Cheyenne Garvey  MRN: [id number]  Birthdate 1950  Date of evaluation: 3/13/2025  Provider: Adrián Vallejo MD    CHIEF COMPLAINT       Chief Complaint   Patient presents with    Shortness of Breath         HISTORY OF PRESENT ILLNESS      Cheyenne Garvey is a 74 y.o. female who presents to the emergency department with above noted complaint.  Patient has a worsening shortness of breath this week.  No other associated chest pain or other problems does feel some difficulty breathing.  Has history of COPD on home oxygen.        REVIEW OF SYSTEMS     Positive for dyspnea.  No abdominal pain no urinary symptom  Review of Systems  All systems negative except as marked.     PAST MEDICAL HISTORY     Past Medical History:   Diagnosis Date    Acid reflux disease     COPD (chronic obstructive pulmonary disease) (HCC)     Depression          SURGICAL HISTORY       Past Surgical History:   Procedure Laterality Date    APPENDECTOMY      COLONOSCOPY      CYST REMOVAL      multiple    HYSTERECTOMY (CERVIX STATUS UNKNOWN)      MI LAPAROSCOPY SURG CHOLECYSTECTOMY N/A 1/29/2018    CHOLECYSTECTOMY LAPAROSCOPIC performed by Luc Martin DO at [facility] OR    TUBAL LIGATION      UPPER GASTROINTESTINAL ENDOSCOPY           CURRENT MEDICATIONS       Discharge Medication List as of 3/13/2025  4:37 PM        CONTINUE these medications which have NOT CHANGED    Details   valACYclovir (VALTREX) 1 g tablet Historical Med      tiotropium-olodaterol (STIOLTO RESPIMAT) 2.5-2.5 MCG/ACT AERS Inhale 2 puffs into the lungs dailyHistorical Med      Cinnamon 500 MG CAPS Take by mouthHistorical Med      VENTOLIN  (90 Base) MCG/ACT inhaler USE 2 INHALATIONS FOUR TIMES A DAY AS NEEDED FOR WHEEZING OR SHORTNESS OF BREATH, Disp-54 g, R-2, DAWNormal      albuterol (PROVENTIL) (2.5 MG/3ML)

## 2025-03-13 NOTE — ED NOTES
Pt transported out of department via wheelchair at this time to her car.  Pt's gait steady ambulating from bed to wheelchair and wheelchair to car.  Pt's respirations easy and unlabored, skin is pink, warm, and dry.

## 2025-03-13 NOTE — DISCHARGE INSTRUCTIONS
Continue oxygen as priorly set up.  Rest and monitor at home.  Take Levaquin antibiotics daily.  Next dose tomorrow morning.  Take prednisone steroids daily.  I have written a new prescription for you.  Start my new prescription tomorrow morning.  Follow-up with primary care.  If you develop severe shortness of breath worsening symptoms or progression please call 911 or go to

## 2025-05-07 ENCOUNTER — OFFICE VISIT (OUTPATIENT)
Dept: CARDIOLOGY | Age: 75
End: 2025-05-07
Payer: MEDICARE

## 2025-05-07 VITALS
BODY MASS INDEX: 38.2 KG/M2 | DIASTOLIC BLOOD PRESSURE: 60 MMHG | HEIGHT: 63 IN | WEIGHT: 215.6 LBS | SYSTOLIC BLOOD PRESSURE: 138 MMHG | RESPIRATION RATE: 20 BRPM | OXYGEN SATURATION: 95 %

## 2025-05-07 DIAGNOSIS — Z87.891 FORMER SMOKER: ICD-10-CM

## 2025-05-07 DIAGNOSIS — R00.2 PALPITATIONS: Primary | ICD-10-CM

## 2025-05-07 DIAGNOSIS — R00.0 SINUS TACHYCARDIA: ICD-10-CM

## 2025-05-07 DIAGNOSIS — R06.09 DYSPNEA ON EXERTION: ICD-10-CM

## 2025-05-07 DIAGNOSIS — E78.00 PURE HYPERCHOLESTEROLEMIA: ICD-10-CM

## 2025-05-07 PROCEDURE — 1159F MED LIST DOCD IN RCRD: CPT | Performed by: INTERNAL MEDICINE

## 2025-05-07 PROCEDURE — 93005 ELECTROCARDIOGRAM TRACING: CPT | Performed by: INTERNAL MEDICINE

## 2025-05-07 PROCEDURE — 99214 OFFICE O/P EST MOD 30 MIN: CPT | Performed by: INTERNAL MEDICINE

## 2025-05-07 PROCEDURE — 93010 ELECTROCARDIOGRAM REPORT: CPT | Performed by: INTERNAL MEDICINE

## 2025-05-07 PROCEDURE — 1123F ACP DISCUSS/DSCN MKR DOCD: CPT | Performed by: INTERNAL MEDICINE

## 2025-05-07 RX ORDER — VALACYCLOVIR HYDROCHLORIDE 500 MG/1
TABLET, FILM COATED ORAL
COMMUNITY
Start: 2025-04-13

## 2025-05-07 NOTE — PROGRESS NOTES
Today's Date: 5/7/2025  Patient's Name: Cheyenne Garvey  Patient's age: 74 y.o., 1950    Subjective:  Cheyenne Garvey is being seen in clinic today regarding   Chief Complaint   Patient presents with    Chest Pain     6 month     Shortness of Breath       she is here to establish cardiology care.  She has COPD and is on home oxygen.  She reports intermittent substernal chest pain - sharp pain and at time burning sensation. She reports chronic dyspnea on minimal exertion. She also reports fast HR with minimal exertion. She thinks it could be GERD and made worse with water. She has aspiration. There is concern of dysphagia as well. Barium swallow 9/13/2024-grossly normal. Esophagram 10/2/24-unremarkable. GI apt 12/5/24. She reports mild leg swelling by evening. She is a former smoker- quit smoking 4/2024.      5/7/25:  Stress test is negative for reversible ischemia.  Patient has same exertional shortness of breath baseline because of COPD but no chest pain, no chest pressure or chest heaviness.  No lower extremity edema.  No orthopnea.  EKG showed sinus tachycardia with heart rate of 102 bpm due to underlying COPD on inhalers    Past Medical History:   has a past medical history of Acid reflux disease, COPD (chronic obstructive pulmonary disease) (HCC), and Depression.    Past Surgical History:   has a past surgical history that includes Appendectomy; Hysterectomy; Colonoscopy; Tubal ligation; Upper gastrointestinal endoscopy; cyst removal; and pr laparoscopy surg cholecystectomy (N/A, 1/29/2018).    Home Medications:  Prior to Admission medications    Medication Sig Start Date End Date Taking? Authorizing Provider   valACYclovir (VALTREX) 500 MG tablet  4/13/25  Yes Terri Alexandre MD   tiotropium-olodaterol (STIOLTO RESPIMAT) 2.5-2.5 MCG/ACT AERS Inhale 2 puffs into the lungs daily   Yes Terri Alexandre MD   Cinnamon 500 MG CAPS Take by mouth   Yes Terri Alexandre MD   VENTOLIN HFA

## 2025-06-15 ENCOUNTER — APPOINTMENT (OUTPATIENT)
Dept: GENERAL RADIOLOGY | Age: 75
End: 2025-06-15
Payer: MEDICARE

## 2025-06-15 ENCOUNTER — HOSPITAL ENCOUNTER (EMERGENCY)
Age: 75
Discharge: HOME | End: 2025-06-15
Attending: SPECIALIST
Payer: MEDICARE

## 2025-06-15 VITALS
SYSTOLIC BLOOD PRESSURE: 164 MMHG | BODY MASS INDEX: 37.91 KG/M2 | RESPIRATION RATE: 24 BRPM | OXYGEN SATURATION: 96 % | WEIGHT: 214 LBS | DIASTOLIC BLOOD PRESSURE: 62 MMHG | HEART RATE: 85 BPM | TEMPERATURE: 98.6 F

## 2025-06-15 DIAGNOSIS — J44.1 COPD EXACERBATION (HCC): Primary | ICD-10-CM

## 2025-06-15 LAB
ALBUMIN SERPL-MCNC: 3.8 G/DL (ref 3.5–5.2)
ALBUMIN/GLOB SERPL: 1.6 {RATIO} (ref 1–2.5)
ALP SERPL-CCNC: 107 U/L (ref 35–104)
ALT SERPL-CCNC: 47 U/L (ref 10–35)
ANION GAP SERPL CALCULATED.3IONS-SCNC: 9 MMOL/L (ref 9–16)
AST SERPL-CCNC: 30 U/L (ref 10–35)
BASOPHILS # BLD: 0.05 K/UL (ref 0–0.2)
BASOPHILS NFR BLD: 1 % (ref 0–2)
BILIRUB SERPL-MCNC: 0.2 MG/DL (ref 0–1.2)
BNP SERPL-MCNC: 59 PG/ML (ref 0–125)
BUN SERPL-MCNC: 11 MG/DL (ref 8–23)
BUN/CREAT SERPL: 14 (ref 9–20)
CALCIUM SERPL-MCNC: 9.3 MG/DL (ref 8.6–10.4)
CHLORIDE SERPL-SCNC: 106 MMOL/L (ref 98–107)
CO2 SERPL-SCNC: 25 MMOL/L (ref 20–31)
CREAT SERPL-MCNC: 0.8 MG/DL (ref 0.6–0.9)
D DIMER PPP FEU-MCNC: 0.33 UG/ML FEU (ref 0–0.59)
EOSINOPHIL # BLD: 0.18 K/UL (ref 0–0.44)
EOSINOPHILS RELATIVE PERCENT: 2 % (ref 1–4)
ERYTHROCYTE [DISTWIDTH] IN BLOOD BY AUTOMATED COUNT: 13.5 % (ref 11.8–14.4)
GFR, ESTIMATED: 78 ML/MIN/1.73M2
GLUCOSE SERPL-MCNC: 118 MG/DL (ref 74–99)
HCT VFR BLD AUTO: 38.3 % (ref 36.3–47.1)
HGB BLD-MCNC: 12.5 G/DL (ref 11.9–15.1)
IMM GRANULOCYTES # BLD AUTO: 0.04 K/UL (ref 0–0.3)
IMM GRANULOCYTES NFR BLD: 0 %
LYMPHOCYTES NFR BLD: 2.57 K/UL (ref 1.1–3.7)
LYMPHOCYTES RELATIVE PERCENT: 27 % (ref 24–43)
MAGNESIUM SERPL-MCNC: 2.2 MG/DL (ref 1.6–2.4)
MCH RBC QN AUTO: 29.3 PG (ref 25.2–33.5)
MCHC RBC AUTO-ENTMCNC: 32.6 G/DL (ref 25.2–33.5)
MCV RBC AUTO: 89.9 FL (ref 82.6–102.9)
MONOCYTES NFR BLD: 0.89 K/UL (ref 0.1–1.2)
MONOCYTES NFR BLD: 9 % (ref 3–12)
NEUTROPHILS NFR BLD: 61 % (ref 36–65)
NEUTS SEG NFR BLD: 5.89 K/UL (ref 1.5–8.1)
NRBC BLD-RTO: 0 PER 100 WBC
PLATELET # BLD AUTO: 274 K/UL (ref 138–453)
PMV BLD AUTO: 9.5 FL (ref 8.1–13.5)
POTASSIUM SERPL-SCNC: 4.6 MMOL/L (ref 3.7–5.3)
PROT SERPL-MCNC: 6.2 G/DL (ref 6.6–8.7)
RBC # BLD AUTO: 4.26 M/UL (ref 3.95–5.11)
SODIUM SERPL-SCNC: 140 MMOL/L (ref 136–145)
TROPONIN I SERPL HS-MCNC: <6 NG/L (ref 0–14)
WBC OTHER # BLD: 9.6 K/UL (ref 3.5–11.3)

## 2025-06-15 PROCEDURE — 6370000000 HC RX 637 (ALT 250 FOR IP): Performed by: SPECIALIST

## 2025-06-15 PROCEDURE — 36415 COLL VENOUS BLD VENIPUNCTURE: CPT

## 2025-06-15 PROCEDURE — 83880 ASSAY OF NATRIURETIC PEPTIDE: CPT

## 2025-06-15 PROCEDURE — 94640 AIRWAY INHALATION TREATMENT: CPT

## 2025-06-15 PROCEDURE — 99285 EMERGENCY DEPT VISIT HI MDM: CPT

## 2025-06-15 PROCEDURE — 71045 X-RAY EXAM CHEST 1 VIEW: CPT

## 2025-06-15 PROCEDURE — 85379 FIBRIN DEGRADATION QUANT: CPT

## 2025-06-15 PROCEDURE — 6360000002 HC RX W HCPCS: Performed by: SPECIALIST

## 2025-06-15 PROCEDURE — 96374 THER/PROPH/DIAG INJ IV PUSH: CPT

## 2025-06-15 PROCEDURE — 83735 ASSAY OF MAGNESIUM: CPT

## 2025-06-15 PROCEDURE — 84484 ASSAY OF TROPONIN QUANT: CPT

## 2025-06-15 PROCEDURE — 80053 COMPREHEN METABOLIC PANEL: CPT

## 2025-06-15 PROCEDURE — 93005 ELECTROCARDIOGRAM TRACING: CPT | Performed by: SPECIALIST

## 2025-06-15 PROCEDURE — 85025 COMPLETE CBC W/AUTO DIFF WBC: CPT

## 2025-06-15 RX ORDER — METHYLPREDNISOLONE SODIUM SUCCINATE 125 MG/2ML
125 INJECTION INTRAMUSCULAR; INTRAVENOUS ONCE
Status: COMPLETED | OUTPATIENT
Start: 2025-06-15 | End: 2025-06-15

## 2025-06-15 RX ORDER — IPRATROPIUM BROMIDE AND ALBUTEROL SULFATE 2.5; .5 MG/3ML; MG/3ML
1 SOLUTION RESPIRATORY (INHALATION)
Status: COMPLETED | OUTPATIENT
Start: 2025-06-15 | End: 2025-06-15

## 2025-06-15 RX ORDER — PREDNISONE 20 MG/1
20 TABLET ORAL 2 TIMES DAILY
Qty: 10 TABLET | Refills: 0 | Status: SHIPPED | OUTPATIENT
Start: 2025-06-15 | End: 2025-06-20

## 2025-06-15 RX ADMIN — IPRATROPIUM BROMIDE AND ALBUTEROL SULFATE 1 DOSE: .5; 2.5 SOLUTION RESPIRATORY (INHALATION) at 20:53

## 2025-06-15 RX ADMIN — METHYLPREDNISOLONE SODIUM SUCCINATE 125 MG: 125 INJECTION, POWDER, FOR SOLUTION INTRAMUSCULAR; INTRAVENOUS at 21:04

## 2025-06-15 ASSESSMENT — PAIN - FUNCTIONAL ASSESSMENT: PAIN_FUNCTIONAL_ASSESSMENT: NONE - DENIES PAIN

## 2025-06-16 LAB
EKG ATRIAL RATE: 89 BPM
EKG P AXIS: 47 DEGREES
EKG P-R INTERVAL: 192 MS
EKG Q-T INTERVAL: 348 MS
EKG QRS DURATION: 88 MS
EKG QTC CALCULATION (BAZETT): 423 MS
EKG R AXIS: -5 DEGREES
EKG T AXIS: 38 DEGREES
EKG VENTRICULAR RATE: 89 BPM

## 2025-06-16 ASSESSMENT — ENCOUNTER SYMPTOMS
BACK PAIN: 0
ABDOMINAL PAIN: 0
CHEST TIGHTNESS: 1
NAUSEA: 0
SHORTNESS OF BREATH: 1
SORE THROAT: 0
WHEEZING: 1

## 2025-06-16 NOTE — ED PROVIDER NOTES
[facility]  EMERGENCY DEPARTMENT ENCOUNTER      Pt Name: Cheyenne Garvey  MRN: [id number]  Birthdate 1950  Date of evaluation: 6/15/2025      CHIEF COMPLAINT       Chief Complaint   Patient presents with    Shortness of Breath         HISTORY OF PRESENT ILLNESS    Cheyenne Garvey is a 74 y.o. female who presents to the emergency department brought in via EMS for evaluation of shortness of breath, progressively worsening for 2 weeks prior to arrival, worse today.  Patient admits to having wheezing and some chest heaviness but denies any cough, fever or chills.  She is unsure about any swelling in the legs.  Patient has been using her inhalers and aerosol treatments at home without much relief.  She uses oxygen 2 and half liters per minute per nasal cannula 24 hours a day.  She was given DuoNeb unit dose aerosol treatment by EMS prior to arrival.  She denies any lightheadedness, dizziness, palpitations or diaphoresis and denies any abdominal pain, vomiting or diarrhea.  She has history of COPD and has been prior smoker.  She has quit smoking in April 2024 but used to smoke 2-2 and half packs per day for 50 to 60 years prior to that.  She has been on corticosteroids in the past but none at this time.  She has never been intubated or mechanically ventilated in the past.  There are no exacerbating or relieving factors.      REVIEW OF SYSTEMS       Review of Systems   Constitutional:  Negative for chills and fever.   HENT:  Negative for congestion and sore throat.    Respiratory:  Positive for chest tightness, shortness of breath and wheezing.    Cardiovascular:  Negative for palpitations.   Gastrointestinal:  Negative for abdominal pain and nausea.   Genitourinary:  Negative for dysuria and frequency.   Musculoskeletal:  Negative for back pain and neck pain.   Skin:  Negative for rash.   Neurological:  Negative for dizziness, light-headedness and headaches.   All other systems reviewed and are

## 2025-06-16 NOTE — DISCHARGE INSTRUCTIONS
Please understand that at this time there is no evidence for a more serious underlying process, but that early in the process of an illness or injury, an emergency department workup can be falsely reassuring.  You should contact your family doctor within the next 48 hours for a follow up appointment    THANK YOU!!!    From [facility] and [facility]    On behalf of the Emergency Department staff at [facility], I would like to thank you for giving us the opportunity to address your health care needs and concerns.    We hope that during your visit, our service was delivered in a professional and caring manner. Please keep [facility] in mind as we walk with you down the path to your own personal wellness.     Please expect an automated text message or email from us so we can ask a few questions about your health and progress. Based on your answers, a clinician may call you back to offer help and instructions.    Please understand that early in the process of an illness or injury, an emergency department workup can be falsely reassuring.  If you notice any worsening, changing or persistent symptoms please call your family doctor or return to the ER immediately.     Tell us how we did during your visit at [email or web address]   and let us know about your experience

## 2025-09-03 ENCOUNTER — OFFICE VISIT (OUTPATIENT)
Dept: PULMONOLOGY | Age: 75
End: 2025-09-03
Payer: MEDICARE

## 2025-09-03 VITALS
DIASTOLIC BLOOD PRESSURE: 68 MMHG | HEART RATE: 89 BPM | HEIGHT: 63 IN | TEMPERATURE: 97.9 F | BODY MASS INDEX: 37.63 KG/M2 | RESPIRATION RATE: 20 BRPM | WEIGHT: 212.4 LBS | OXYGEN SATURATION: 96 % | SYSTOLIC BLOOD PRESSURE: 138 MMHG

## 2025-09-03 DIAGNOSIS — Z87.891 PERSONAL HISTORY OF TOBACCO USE: ICD-10-CM

## 2025-09-03 DIAGNOSIS — J96.11 CHRONIC RESPIRATORY FAILURE WITH HYPOXIA (HCC): ICD-10-CM

## 2025-09-03 DIAGNOSIS — J43.2 CENTRILOBULAR EMPHYSEMA (HCC): ICD-10-CM

## 2025-09-03 DIAGNOSIS — J44.9 STAGE 3 SEVERE COPD BY GOLD CLASSIFICATION (HCC): Primary | ICD-10-CM

## 2025-09-03 PROCEDURE — 1126F AMNT PAIN NOTED NONE PRSNT: CPT | Performed by: INTERNAL MEDICINE

## 2025-09-03 PROCEDURE — 1159F MED LIST DOCD IN RCRD: CPT | Performed by: INTERNAL MEDICINE

## 2025-09-03 PROCEDURE — 99214 OFFICE O/P EST MOD 30 MIN: CPT | Performed by: INTERNAL MEDICINE

## 2025-09-03 PROCEDURE — 1123F ACP DISCUSS/DSCN MKR DOCD: CPT | Performed by: INTERNAL MEDICINE

## 2025-09-03 PROCEDURE — 1160F RVW MEDS BY RX/DR IN RCRD: CPT | Performed by: INTERNAL MEDICINE

## 2025-09-03 PROCEDURE — G0296 VISIT TO DETERM LDCT ELIG: HCPCS | Performed by: INTERNAL MEDICINE

## (undated) DEVICE — YANKAUER,BULB TIP,W/O VENT,RIGID,STERILE: Brand: MEDLINE

## (undated) DEVICE — TUBING, SUCTION, 1/4" X 20', STRAIGHT: Brand: MEDLINE INDUSTRIES, INC.

## (undated) DEVICE — BLADE LARYNSCP SZ 3 ENH DIR INTUB GLIDESCOPE MCGRATH MAC

## (undated) DEVICE — APPLICATOR SURG XL L38CM FOR ARISTA ABSRB HEMSTAT FLEXITIP

## (undated) DEVICE — SOLUTION IV 1000ML 0.9% SOD CHL PH 5 INJ USP VIAFLX PLAS

## (undated) DEVICE — STRIP,CLOSURE,WOUND,MEDI-STRIP,1/2X4: Brand: MEDLINE

## (undated) DEVICE — CANISTER, RIGID, 2000CC: Brand: MEDLINE INDUSTRIES, INC.

## (undated) DEVICE — ROYAL SILK SURGICAL GOWN, XXL: Brand: CONVERTORS

## (undated) DEVICE — THERMOGARD PLUS ABC DUAL DISPERSIVE ELECTRODE: Brand: THERMOGARD

## (undated) DEVICE — GLOVE ORANGE PI 8 1/2   MSG9085

## (undated) DEVICE — POSITIONER HD W8XH4XL8.5IN RASPBERRY FOAM SLT

## (undated) DEVICE — COVER ARMBRD W13XL28.5IN IMPERV BLU FOR OP RM

## (undated) DEVICE — CHLORAPREP 26ML ORANGE

## (undated) DEVICE — AGENT HEMSTAT 3GM PURIFIED PLNT STARCH PWD ABSRB ARISTA AH